# Patient Record
Sex: FEMALE | Race: WHITE | NOT HISPANIC OR LATINO | Employment: OTHER | URBAN - METROPOLITAN AREA
[De-identification: names, ages, dates, MRNs, and addresses within clinical notes are randomized per-mention and may not be internally consistent; named-entity substitution may affect disease eponyms.]

---

## 2017-02-14 DIAGNOSIS — Z00.00 ROUTINE GENERAL MEDICAL EXAMINATION AT A HEALTH CARE FACILITY: Primary | ICD-10-CM

## 2017-04-11 ENCOUNTER — OFFICE VISIT (OUTPATIENT)
Dept: OBSTETRICS AND GYNECOLOGY | Facility: CLINIC | Age: 67
End: 2017-04-11
Payer: MEDICARE

## 2017-04-11 VITALS
BODY MASS INDEX: 22.46 KG/M2 | HEIGHT: 63 IN | SYSTOLIC BLOOD PRESSURE: 116 MMHG | DIASTOLIC BLOOD PRESSURE: 62 MMHG | WEIGHT: 126.75 LBS

## 2017-04-11 DIAGNOSIS — Z12.31 VISIT FOR SCREENING MAMMOGRAM: ICD-10-CM

## 2017-04-11 DIAGNOSIS — Z12.4 ROUTINE PAPANICOLAOU SMEAR: Primary | ICD-10-CM

## 2017-04-11 PROCEDURE — 88175 CYTOPATH C/V AUTO FLUID REDO: CPT

## 2017-04-11 PROCEDURE — G0101 CA SCREEN;PELVIC/BREAST EXAM: HCPCS | Mod: S$PBB,,, | Performed by: OBSTETRICS & GYNECOLOGY

## 2017-04-11 PROCEDURE — 99212 OFFICE O/P EST SF 10 MIN: CPT | Mod: PBBFAC,PO | Performed by: OBSTETRICS & GYNECOLOGY

## 2017-04-11 PROCEDURE — 99999 PR PBB SHADOW E&M-EST. PATIENT-LVL II: CPT | Mod: PBBFAC,,, | Performed by: OBSTETRICS & GYNECOLOGY

## 2017-04-11 NOTE — PROGRESS NOTES
"HPI:   66 y.o.   OB History      Para Term  AB TAB SAB Ectopic Multiple Living    2 2 1       1       No LMP recorded. Patient is postmenopausal.    Patient is  here for her annual gynecologic exam.  She has no complaints.     ROS:  GENERAL: No fever, chills, fatigability or weight loss.  SKIN: No rashes, itching or changes in color or texture of skin.  HEAD: No headaches or recent head trauma.  EYES: Visual acuity fine. No photophobia, ocular pain or diplopia.  EARS: Denies ear pain, discharge or vertigo.  NOSE: No loss of smell, no epistaxis or postnasal drip.  MOUTH & THROAT: No hoarseness or change in voice. No excessive gum bleeding.  NODES: Denies swollen glands.  CHEST: Denies MANCERA, cyanosis, wheezing, cough and sputum production.  CARDIOVASCULAR: Denies chest pain, PND, orthopnea or reduced exercise tolerance.  ABDOMEN: Appetite fine. No weight loss. Denies diarrhea, abdominal pain, hematemesis or blood in stool.  URINARY: No flank pain, dysuria or hematuria.  PERIPHERAL VASCULAR: No claudication or cyanosis.  MUSCULOSKELETAL: No joint stiffness or swelling. Denies back pain.  NEUROLOGIC: No history of seizures, paralysis, alteration of gait or coordination.    PE:   /62  Ht 5' 3" (1.6 m)  Wt 57.5 kg (126 lb 12.2 oz)  BMI 22.46 kg/m2  APPEARANCE: Well nourished, well developed, in no acute distress.  NECK: Neck symmetric without masses or thyromegaly.  BREASTS: Symmetrical, no skin changes or visible lesions. No palpable masses, nipple discharge or adenopathy bilaterally.  ABDOMEN: Flat. Soft. No tenderness or masses. No hepatosplenomegaly. No hernias. No CVA tenderness.  VULVA: No lesions. Normal female genitalia.  URETHRAL MEATUS: Normal size and location, no lesions, no prolapse.  URETHRA: No masses, tenderness, prolapse or scarring.  VAGINA: Moist and well rugated, no discharge, no significant cystocele or rectocele.  CERVIX: No lesions and discharge. PAP done.  UTERUS: Normal size, " regular shape, mobile, non-tender, bladder base nontender.  ADNEXA: No masses, tenderness or CDS nodularity.  ANUS PERINEUM: Normal.    PROCEDURES:  Pap smear    Assessment:  Normal Gynecologic Exam    Plan:  Mammogram and Colonoscopy if indicated by current recommendations.  Return to clinic in one year or for any problems or complaints.  No uga

## 2017-04-12 ENCOUNTER — HOSPITAL ENCOUNTER (OUTPATIENT)
Dept: RADIOLOGY | Facility: CLINIC | Age: 67
Discharge: HOME | End: 2017-04-12
Payer: MEDICARE

## 2017-04-12 ENCOUNTER — CLINICAL SUPPORT (OUTPATIENT)
Dept: INTERNAL MEDICINE | Facility: CLINIC | Age: 67
End: 2017-04-12
Payer: COMMERCIAL

## 2017-04-12 ENCOUNTER — HOSPITAL ENCOUNTER (OUTPATIENT)
Dept: RADIOLOGY | Facility: HOSPITAL | Age: 67
Discharge: HOME | End: 2017-04-12
Attending: INTERNAL MEDICINE
Payer: MEDICARE

## 2017-04-12 ENCOUNTER — CLINICAL SUPPORT (OUTPATIENT)
Dept: INFECTIOUS DISEASES | Facility: CLINIC | Age: 67
End: 2017-04-12
Payer: MEDICARE

## 2017-04-12 ENCOUNTER — OFFICE VISIT (OUTPATIENT)
Dept: INTERNAL MEDICINE | Facility: CLINIC | Age: 67
End: 2017-04-12
Payer: MEDICARE

## 2017-04-12 ENCOUNTER — HOSPITAL ENCOUNTER (OUTPATIENT)
Dept: CARDIOLOGY | Facility: CLINIC | Age: 67
Discharge: HOME | End: 2017-04-12
Payer: MEDICARE

## 2017-04-12 VITALS
HEIGHT: 64 IN | BODY MASS INDEX: 21.46 KG/M2 | DIASTOLIC BLOOD PRESSURE: 80 MMHG | SYSTOLIC BLOOD PRESSURE: 125 MMHG | WEIGHT: 125.69 LBS

## 2017-04-12 DIAGNOSIS — Z00.00 ROUTINE GENERAL MEDICAL EXAMINATION AT A HEALTH CARE FACILITY: ICD-10-CM

## 2017-04-12 DIAGNOSIS — Z00.00 ANNUAL PHYSICAL EXAM: Primary | ICD-10-CM

## 2017-04-12 DIAGNOSIS — Z00.00 ROUTINE GENERAL MEDICAL EXAMINATION AT A HEALTH CARE FACILITY: Primary | ICD-10-CM

## 2017-04-12 DIAGNOSIS — R42 VERTIGO: ICD-10-CM

## 2017-04-12 LAB
ALBUMIN SERPL BCP-MCNC: 3.8 G/DL
ALP SERPL-CCNC: 65 U/L
ALT SERPL W/O P-5'-P-CCNC: 17 U/L
ANION GAP SERPL CALC-SCNC: 6 MMOL/L
AST SERPL-CCNC: 21 U/L
BILIRUB SERPL-MCNC: 0.5 MG/DL
BUN SERPL-MCNC: 14 MG/DL
CALCIUM SERPL-MCNC: 9.3 MG/DL
CHLORIDE SERPL-SCNC: 102 MMOL/L
CHOLEST/HDLC SERPL: 2.5 {RATIO}
CO2 SERPL-SCNC: 30 MMOL/L
CREAT SERPL-MCNC: 0.8 MG/DL
ERYTHROCYTE [DISTWIDTH] IN BLOOD BY AUTOMATED COUNT: 13.1 %
EST. GFR  (AFRICAN AMERICAN): >60 ML/MIN/1.73 M^2
EST. GFR  (NON AFRICAN AMERICAN): >60 ML/MIN/1.73 M^2
GLUCOSE SERPL-MCNC: 87 MG/DL
HCT VFR BLD AUTO: 41 %
HDL/CHOLESTEROL RATIO: 40.1 %
HDLC SERPL-MCNC: 177 MG/DL
HDLC SERPL-MCNC: 71 MG/DL
HGB BLD-MCNC: 13.5 G/DL
HIV 1+2 AB+HIV1 P24 AG SERPL QL IA: NEGATIVE
LDLC SERPL CALC-MCNC: 97.2 MG/DL
MCH RBC QN AUTO: 32.5 PG
MCHC RBC AUTO-ENTMCNC: 32.9 %
MCV RBC AUTO: 99 FL
NONHDLC SERPL-MCNC: 106 MG/DL
PLATELET # BLD AUTO: 312 K/UL
PMV BLD AUTO: 10 FL
POTASSIUM SERPL-SCNC: 5 MMOL/L
PROT SERPL-MCNC: 6.9 G/DL
RBC # BLD AUTO: 4.16 M/UL
SODIUM SERPL-SCNC: 138 MMOL/L
TRIGL SERPL-MCNC: 44 MG/DL
WBC # BLD AUTO: 4.64 K/UL

## 2017-04-12 PROCEDURE — 77080 DXA BONE DENSITY AXIAL: CPT | Mod: TC

## 2017-04-12 PROCEDURE — 93010 ELECTROCARDIOGRAM REPORT: CPT | Mod: S$PBB,,, | Performed by: INTERNAL MEDICINE

## 2017-04-12 PROCEDURE — 99213 OFFICE O/P EST LOW 20 MIN: CPT | Mod: PBBFAC,25 | Performed by: INTERNAL MEDICINE

## 2017-04-12 PROCEDURE — 86703 HIV-1/HIV-2 1 RESULT ANTBDY: CPT

## 2017-04-12 PROCEDURE — 80061 LIPID PANEL: CPT

## 2017-04-12 PROCEDURE — 99397 PER PM REEVAL EST PAT 65+ YR: CPT | Mod: S$PBB,,, | Performed by: INTERNAL MEDICINE

## 2017-04-12 PROCEDURE — 93005 ELECTROCARDIOGRAM TRACING: CPT | Mod: PBBFAC | Performed by: INTERNAL MEDICINE

## 2017-04-12 PROCEDURE — 85027 COMPLETE CBC AUTOMATED: CPT

## 2017-04-12 PROCEDURE — 99999 PR PBB SHADOW E&M-EST. PATIENT-LVL I: CPT | Mod: PBBFAC,,,

## 2017-04-12 PROCEDURE — 90715 TDAP VACCINE 7 YRS/> IM: CPT | Mod: PBBFAC

## 2017-04-12 PROCEDURE — 90471 IMMUNIZATION ADMIN: CPT | Mod: PBBFAC

## 2017-04-12 PROCEDURE — 77067 SCR MAMMO BI INCL CAD: CPT | Mod: TC

## 2017-04-12 PROCEDURE — 77063 BREAST TOMOSYNTHESIS BI: CPT | Mod: 26,,, | Performed by: RADIOLOGY

## 2017-04-12 PROCEDURE — 36415 COLL VENOUS BLD VENIPUNCTURE: CPT

## 2017-04-12 PROCEDURE — 77067 SCR MAMMO BI INCL CAD: CPT | Mod: 26,,, | Performed by: RADIOLOGY

## 2017-04-12 PROCEDURE — 80053 COMPREHEN METABOLIC PANEL: CPT

## 2017-04-12 PROCEDURE — 99999 PR PBB SHADOW E&M-EST. PATIENT-LVL III: CPT | Mod: PBBFAC,,, | Performed by: INTERNAL MEDICINE

## 2017-04-12 PROCEDURE — 77080 DXA BONE DENSITY AXIAL: CPT | Mod: 26,,, | Performed by: INTERNAL MEDICINE

## 2017-04-12 PROCEDURE — 71020 XR CHEST PA AND LATERAL: CPT | Mod: TC

## 2017-04-12 PROCEDURE — 99211 OFF/OP EST MAY X REQ PHY/QHP: CPT | Mod: PBBFAC,25,27

## 2017-04-12 PROCEDURE — 71020 XR CHEST PA AND LATERAL: CPT | Mod: 26,,, | Performed by: RADIOLOGY

## 2017-04-12 RX ORDER — MECLIZINE HYDROCHLORIDE 25 MG/1
25 TABLET ORAL 3 TIMES DAILY PRN
Qty: 30 TABLET | Refills: 1 | Status: SHIPPED | OUTPATIENT
Start: 2017-04-12 | End: 2020-06-05

## 2017-04-12 NOTE — LETTER
April 13, 2017    Gricelda Hassan  [address] [place] [address]             [facility] - Internal Medicine  [address] [place] [address]  Phone: [phone]  Fax: [phone] Dear Mrs. Hassan:    Thank you for allowing me to serve you and perform your Executive Health exam on 4/12/2017.  This letter will serve a brief summary of the history, physical findings, and laboratory/studies performed and recommendations at that time.    Reason for Visit: Executive Health Preventive Physical Examination    Past Medical History:   Diagnosis Date    Atrial tachycardia     Hyperlipidemia     Hypertension     Ischemic colitis        Past Surgical History:   Procedure Laterality Date    TUBAL LIGATION         Family History   Problem Relation Age of Onset    COPD Mother     Heart disease Father      CABG age 60    Hypertension Brother     Hypertension Sister     No Known Problems Daughter     No Known Problems Daughter             Review of patient's allergies indicates:   Allergen Reactions    Penicillins Rash         Current Outpatient Prescriptions:     aspirin 81 MG Chew, Take 1 tablet (81 mg total) by mouth once daily., Disp: 30 tablet, Rfl: 12    atorvastatin (LIPITOR) 40 MG tablet, Take 40 mg by mouth once daily., Disp: , Rfl:     b complex vitamins capsule, Take 1 capsule by mouth once daily., Disp: , Rfl:     calcium-vitamin D3 (CALCIUM 500 + D) 500 mg(1,250mg) -200 unit per tablet, Take 1 tablet by mouth 2 (two) times daily with meals., Disp: , Rfl:     multivitamin capsule, Take 1 capsule by mouth once daily., Disp: , Rfl:     propafenone (RHTHYMOL) 150 MG Tab, , Disp: , Rfl:     quinapril (ACCUPRIL) 20 MG tablet, Take by mouth. 1 Tablet Oral Every day, Disp: , Rfl:     meclizine (ANTIVERT) 25 mg tablet, Take 1 tablet (25 mg total) by mouth 3 (three) times daily as needed., Disp: 30 tablet, Rfl: 1     Review of Systems  Review of Systems - Negative     Physical Exam:  General:  General appearance: alert, well appearing, and in no distress.   Skin: Skin exam - normal coloration and turgor, no rashes, no suspicious skin lesions noted.  HEENT: Ears - bilateral TM's and external ear canals normal. , ENT exam reveals - ENT exam normal, no neck nodes or sinus tenderness.   Lungs: Chest: clear to auscultation, no wheezes, rales or rhonchi, symmetric air entry.   Heart: CVS exam: normal rate, regular rhythm, normal S1, S2, no murmurs, rubs, clicks or gallops.   Extremities: Exam of extremities: peripheral pulses normal, no pedal edema, no clubbing or cyanosis    Labs:  Results for orders placed or performed in visit on 04/12/17   Comprehensive metabolic panel   Result Value Ref Range    Sodium 138 136 - 145 mmol/L    Potassium 5.0 3.5 - 5.1 mmol/L    Chloride 102 95 - 110 mmol/L    CO2 30 (H) 23 - 29 mmol/L    Glucose 87 70 - 110 mg/dL    BUN, Bld 14 8 - 23 mg/dL    Creatinine 0.8 0.5 - 1.4 mg/dL    Calcium 9.3 8.7 - 10.5 mg/dL    Total Protein 6.9 6.0 - 8.4 g/dL    Albumin 3.8 3.5 - 5.2 g/dL    Total Bilirubin 0.5 0.1 - 1.0 mg/dL    Alkaline Phosphatase 65 55 - 135 U/L    AST 21 10 - 40 U/L    ALT 17 10 - 44 U/L    Anion Gap 6 (L) 8 - 16 mmol/L    eGFR if African American >60.0 >60 mL/min/1.73 m^2    eGFR if non African American >60.0 >60 mL/min/1.73 m^2   Hematology Profile   Result Value Ref Range    WBC 4.64 3.90 - 12.70 K/uL    RBC 4.16 4.00 - 5.40 M/uL    Hemoglobin 13.5 12.0 - 16.0 g/dL    Hematocrit 41.0 37.0 - 48.5 %    MCV 99 (H) 82 - 98 fL    MCH 32.5 (H) 27.0 - 31.0 pg    MCHC 32.9 32.0 - 36.0 %    RDW 13.1 11.5 - 14.5 %    Platelets 312 150 - 350 K/uL    MPV 10.0 9.2 - 12.9 fL   Lipid panel   Result Value Ref Range    Cholesterol 177 120 - 199 mg/dL    Triglycerides 44 30 - 150 mg/dL    HDL 71 40 - 75 mg/dL    LDL Cholesterol 97.2 63.0 - 159.0 mg/dL    HDL/Chol Ratio 40.1 20.0 - 50.0 %    Total Cholesterol/HDL Ratio 2.5 2.0 - 5.0    Non-HDL Cholesterol 106 mg/dL   HIV-1 and HIV-2  antibodies   Result Value Ref Range    HIV 1/2 Ag/Ab Negative Negative        Assessment/Recommendations:  Routine Health Maintenance is up to date.  You will be due for a colonoscopy this year.  I also recommend hepatitis C screening one time.  We discussed pneumonia vaccination and you indicated you've had one of the vaccines in the last 6 months or so.   You will be due for the second pneumonia vaccine a year after the first.    At this time, you appear to be in good medical condition.  I look forward to seeing you again next year.  Please contact me should you have any questions or concerns regarding physical findings, or my recommendations.       Sincerely,          Felicity Orozco MD

## 2017-04-13 ENCOUNTER — TELEPHONE (OUTPATIENT)
Dept: INTERNAL MEDICINE | Facility: CLINIC | Age: 67
End: 2017-04-13

## 2017-04-13 NOTE — PROGRESS NOTES
Subjective:       Patient ID: Gricelda Hassan is a 66 y.o. female.    Chief Complaint: Executive Health    HPI Comments: EH PE    Doing well, no complaints other than some stress- granddaughter's situation.  Is exercising.    No syncope or chest pain.  Cardiac issues all stable.    Sees Dr. Antunez once yearly.    Rarely gets vertigo, not severe.    Patient Active Problem List:     Mixed hyperlipidemia     Essential hypertension     Constipation, chronic     Former smoker: quit 1/1/1985: < 30 pack years     Abnormal findings diagnostic imaging of heart and coronary circulation     Ischemic colitis     Osteopenia: 2015- stable 2017     Atrial tachycardia      Review of Systems   Constitutional: Negative for activity change, appetite change, chills, fatigue and fever.   HENT: Negative for ear discharge, sinus pressure and sore throat.    Respiratory: Negative for apnea, cough, chest tightness, shortness of breath and wheezing.    Cardiovascular: Negative for chest pain, palpitations and leg swelling.   Gastrointestinal: Negative for abdominal distention, abdominal pain, anal bleeding, blood in stool, constipation, diarrhea, nausea and vomiting.   Genitourinary: Negative for dysuria, frequency, hematuria and vaginal bleeding.   Musculoskeletal: Negative for gait problem, joint swelling and myalgias.   Skin: Negative for rash.   Neurological: Positive for dizziness. Negative for tremors, weakness, light-headedness and headaches.   Hematological: Negative for adenopathy. Does not bruise/bleed easily.   Psychiatric/Behavioral: Negative for confusion, hallucinations, sleep disturbance and suicidal ideas.        Some stress, no SI or HI       Objective:      Physical Exam   Constitutional: She is oriented to person, place, and time. She appears well-developed and well-nourished.   HENT:   Head: Normocephalic and atraumatic.   Right Ear: External ear normal.   Left Ear: External ear normal.   Nose: Nose normal.   Mouth/Throat:  Oropharynx is clear and moist. No oropharyngeal exudate.   Eyes: Conjunctivae and EOM are normal. No scleral icterus.   Neck: Normal range of motion. Neck supple. No JVD present. No thyromegaly present.   Cardiovascular: Normal rate, regular rhythm, normal heart sounds and intact distal pulses.  Exam reveals no gallop.    No murmur heard.  Pulmonary/Chest: Effort normal and breath sounds normal. No respiratory distress. She has no wheezes. She exhibits no tenderness.   Abdominal: Soft. Bowel sounds are normal. She exhibits no distension and no mass. There is no tenderness. There is no rebound and no guarding.   Musculoskeletal: Normal range of motion. She exhibits no edema or tenderness.   Lymphadenopathy:     She has no cervical adenopathy.   Neurological: She is alert and oriented to person, place, and time. She displays normal reflexes. No cranial nerve deficit. Coordination normal.   Skin: Skin is warm. No rash noted. No erythema.   Psychiatric: She has a normal mood and affect. Her behavior is normal. Judgment and thought content normal.   Nursing note and vitals reviewed.      Assessment:       1. Annual physical exam    2. Vertigo        Plan:         Annual physical exam    Vertigo  -     meclizine (ANTIVERT) 25 mg tablet; Take 1 tablet (25 mg total) by mouth 3 (three) times daily as needed.  Dispense: 30 tablet; Refill: 1    Hydration  Exercise, fall prevention    I will review all studies and determine further tx depending on findings    She will have colonoscopy and Hep C screening per her PCP    She says she got pneumonia vaccine elsewhere as well

## 2017-04-17 ENCOUNTER — PATIENT MESSAGE (OUTPATIENT)
Dept: INTERNAL MEDICINE | Facility: CLINIC | Age: 67
End: 2017-04-17

## 2017-04-21 ENCOUNTER — PATIENT MESSAGE (OUTPATIENT)
Dept: INTERNAL MEDICINE | Facility: CLINIC | Age: 67
End: 2017-04-21

## 2018-02-19 DIAGNOSIS — Z12.39 SCREENING BREAST EXAMINATION: ICD-10-CM

## 2018-02-19 DIAGNOSIS — Z00.00 ROUTINE GENERAL MEDICAL EXAMINATION AT A HEALTH CARE FACILITY: Primary | ICD-10-CM

## 2018-03-15 ENCOUNTER — HOSPITAL ENCOUNTER (OUTPATIENT)
Dept: RADIOLOGY | Facility: HOSPITAL | Age: 68
Discharge: HOME | End: 2018-03-15
Attending: INTERNAL MEDICINE
Payer: MEDICARE

## 2018-03-15 ENCOUNTER — OFFICE VISIT (OUTPATIENT)
Dept: OBSTETRICS AND GYNECOLOGY | Facility: CLINIC | Age: 68
End: 2018-03-15
Payer: MEDICARE

## 2018-03-15 ENCOUNTER — HOSPITAL ENCOUNTER (OUTPATIENT)
Dept: CARDIOLOGY | Facility: CLINIC | Age: 68
Discharge: HOME | End: 2018-03-15
Payer: MEDICARE

## 2018-03-15 ENCOUNTER — CLINICAL SUPPORT (OUTPATIENT)
Dept: INTERNAL MEDICINE | Facility: CLINIC | Age: 68
End: 2018-03-15
Payer: MEDICARE

## 2018-03-15 ENCOUNTER — OFFICE VISIT (OUTPATIENT)
Dept: INTERNAL MEDICINE | Facility: CLINIC | Age: 68
End: 2018-03-15
Payer: MEDICARE

## 2018-03-15 VITALS
WEIGHT: 127.19 LBS | DIASTOLIC BLOOD PRESSURE: 60 MMHG | BODY MASS INDEX: 22.54 KG/M2 | HEIGHT: 63 IN | SYSTOLIC BLOOD PRESSURE: 110 MMHG

## 2018-03-15 VITALS
DIASTOLIC BLOOD PRESSURE: 75 MMHG | HEIGHT: 63 IN | SYSTOLIC BLOOD PRESSURE: 120 MMHG | WEIGHT: 125.69 LBS | BODY MASS INDEX: 22.27 KG/M2

## 2018-03-15 DIAGNOSIS — E78.5 HYPERLIPIDEMIA, UNSPECIFIED HYPERLIPIDEMIA TYPE: ICD-10-CM

## 2018-03-15 DIAGNOSIS — Z00.00 ANNUAL PHYSICAL EXAM: Primary | ICD-10-CM

## 2018-03-15 DIAGNOSIS — Z12.39 SCREENING BREAST EXAMINATION: ICD-10-CM

## 2018-03-15 DIAGNOSIS — Z00.00 ROUTINE GENERAL MEDICAL EXAMINATION AT A HEALTH CARE FACILITY: ICD-10-CM

## 2018-03-15 DIAGNOSIS — Z12.4 SCREENING FOR CERVICAL CANCER: Primary | ICD-10-CM

## 2018-03-15 DIAGNOSIS — D64.9 ANEMIA, UNSPECIFIED TYPE: Primary | ICD-10-CM

## 2018-03-15 DIAGNOSIS — Z01.419 WELL WOMAN EXAM WITH ROUTINE GYNECOLOGICAL EXAM: ICD-10-CM

## 2018-03-15 LAB
ALBUMIN SERPL BCP-MCNC: 3.9 G/DL
ALP SERPL-CCNC: 66 U/L
ALT SERPL W/O P-5'-P-CCNC: 16 U/L
ANION GAP SERPL CALC-SCNC: 9 MMOL/L
AST SERPL-CCNC: 21 U/L
BILIRUB SERPL-MCNC: 0.7 MG/DL
BUN SERPL-MCNC: 14 MG/DL
CALCIUM SERPL-MCNC: 9.5 MG/DL
CHLORIDE SERPL-SCNC: 100 MMOL/L
CHOLEST SERPL-MCNC: 187 MG/DL
CHOLEST/HDLC SERPL: 2.4 {RATIO}
CO2 SERPL-SCNC: 27 MMOL/L
CREAT SERPL-MCNC: 0.8 MG/DL
ERYTHROCYTE [DISTWIDTH] IN BLOOD BY AUTOMATED COUNT: 12.8 %
EST. GFR  (AFRICAN AMERICAN): >60 ML/MIN/1.73 M^2
EST. GFR  (NON AFRICAN AMERICAN): >60 ML/MIN/1.73 M^2
GLUCOSE SERPL-MCNC: 90 MG/DL
HCT VFR BLD AUTO: 38.5 %
HDLC SERPL-MCNC: 78 MG/DL
HDLC SERPL: 41.7 %
HGB BLD-MCNC: 12.9 G/DL
LDLC SERPL CALC-MCNC: 99.6 MG/DL
MCH RBC QN AUTO: 33.2 PG
MCHC RBC AUTO-ENTMCNC: 33.5 G/DL
MCV RBC AUTO: 99 FL
NONHDLC SERPL-MCNC: 109 MG/DL
PLATELET # BLD AUTO: 288 K/UL
PMV BLD AUTO: 10.1 FL
POTASSIUM SERPL-SCNC: 4.5 MMOL/L
PROT SERPL-MCNC: 7.1 G/DL
RBC # BLD AUTO: 3.89 M/UL
SODIUM SERPL-SCNC: 136 MMOL/L
TRIGL SERPL-MCNC: 47 MG/DL
WBC # BLD AUTO: 5.12 K/UL

## 2018-03-15 PROCEDURE — G0101 CA SCREEN;PELVIC/BREAST EXAM: HCPCS | Mod: S$GLB,,, | Performed by: OBSTETRICS & GYNECOLOGY

## 2018-03-15 PROCEDURE — 85027 COMPLETE CBC AUTOMATED: CPT

## 2018-03-15 PROCEDURE — 99212 OFFICE O/P EST SF 10 MIN: CPT | Mod: PBBFAC,PO | Performed by: OBSTETRICS & GYNECOLOGY

## 2018-03-15 PROCEDURE — 77067 SCR MAMMO BI INCL CAD: CPT | Mod: TC

## 2018-03-15 PROCEDURE — 99999 PR PBB SHADOW E&M-EST. PATIENT-LVL III: CPT | Mod: PBBFAC,,, | Performed by: INTERNAL MEDICINE

## 2018-03-15 PROCEDURE — 80061 LIPID PANEL: CPT

## 2018-03-15 PROCEDURE — 88175 CYTOPATH C/V AUTO FLUID REDO: CPT

## 2018-03-15 PROCEDURE — 93010 ELECTROCARDIOGRAM REPORT: CPT | Mod: S$PBB,,, | Performed by: INTERNAL MEDICINE

## 2018-03-15 PROCEDURE — 80053 COMPREHEN METABOLIC PANEL: CPT

## 2018-03-15 PROCEDURE — 77067 SCR MAMMO BI INCL CAD: CPT | Mod: 26,,, | Performed by: RADIOLOGY

## 2018-03-15 PROCEDURE — 99213 OFFICE O/P EST LOW 20 MIN: CPT | Mod: PBBFAC,25,27 | Performed by: INTERNAL MEDICINE

## 2018-03-15 PROCEDURE — 77063 BREAST TOMOSYNTHESIS BI: CPT | Mod: 26,,, | Performed by: RADIOLOGY

## 2018-03-15 PROCEDURE — 93005 ELECTROCARDIOGRAM TRACING: CPT | Mod: PBBFAC | Performed by: INTERNAL MEDICINE

## 2018-03-15 PROCEDURE — 99999 PR PBB SHADOW E&M-EST. PATIENT-LVL II: CPT | Mod: PBBFAC,,, | Performed by: OBSTETRICS & GYNECOLOGY

## 2018-03-15 PROCEDURE — 36415 COLL VENOUS BLD VENIPUNCTURE: CPT

## 2018-03-15 PROCEDURE — 99397 PER PM REEVAL EST PAT 65+ YR: CPT | Mod: S$PBB,,, | Performed by: INTERNAL MEDICINE

## 2018-03-15 NOTE — LETTER
March 15, 2018    Gricelda Hassan  [address] [place] [address]             [facility] - Internal Medicine  [address] [place] [address]  Phone: [phone]  Fax: [phone] Dear Mrs. Hassan:    Thank you for allowing me to serve you and perform your Executive Health exam on 3/15/2018.  This letter will serve a brief summary of the history, physical findings, and laboratory/studies performed and recommendations at that time.    Reason for Visit: Executive Health Preventive Physical Examination    Past Medical History:   Diagnosis Date    Atrial tachycardia     Hyperlipidemia     Hypertension     Ischemic colitis        Past Surgical History:   Procedure Laterality Date    TUBAL LIGATION         Family History   Problem Relation Age of Onset    COPD Mother     Heart disease Father      CABG age 60    Hypertension Brother     Neuropathy Brother     Hypertension Sister     No Known Problems Daughter     No Known Problems Daughter             Review of patient's allergies indicates:   Allergen Reactions    Penicillins Rash         Current Outpatient Prescriptions:     aspirin 81 MG Chew, Take 1 tablet (81 mg total) by mouth once daily., Disp: 30 tablet, Rfl: 12    atorvastatin (LIPITOR) 40 MG tablet, Take 40 mg by mouth once daily., Disp: , Rfl:     b complex vitamins capsule, Take 1 capsule by mouth once daily., Disp: , Rfl:     calcium-vitamin D3 (CALCIUM 500 + D) 500 mg(1,250mg) -200 unit per tablet, Take 1 tablet by mouth 2 (two) times daily with meals., Disp: , Rfl:     meclizine (ANTIVERT) 25 mg tablet, Take 1 tablet (25 mg total) by mouth 3 (three) times daily as needed., Disp: 30 tablet, Rfl: 1    multivitamin capsule, Take 1 capsule by mouth once daily., Disp: , Rfl:     propafenone (RHTHYMOL) 150 MG Tab, Take 1 tablet by mouth 2 (two) times daily., Disp: , Rfl:     quinapril (ACCUPRIL) 20 MG tablet, Take by mouth. 1 Tablet Oral Every day, Disp: , Rfl:      Review of  Systems  Review of Systems - Negative except for occasional tachycardia, and a recent broken toe.    Physical Exam:  General: General appearance: alert, well appearing, and in no distress.   Skin: Skin exam - normal coloration and turgor, no rashes, no suspicious skin lesions noted.  HEENT: Ears - bilateral TM's and external ear canals normal. , ENT exam reveals - ENT exam normal, no neck nodes or sinus tenderness.   Lungs: Chest: clear to auscultation, no wheezes, rales or rhonchi, symmetric air entry.   Heart: CVS exam: normal rate, regular rhythm, normal S1, S2, no murmurs, rubs, clicks or gallops.   Extremities: Exam of extremities: peripheral pulses normal, no pedal edema, no clubbing or cyanosis    Labs:  Results for orders placed or performed in visit on 03/15/18   Comprehensive metabolic panel   Result Value Ref Range    Sodium 136 136 - 145 mmol/L    Potassium 4.5 3.5 - 5.1 mmol/L    Chloride 100 95 - 110 mmol/L    CO2 27 23 - 29 mmol/L    Glucose 90 70 - 110 mg/dL    BUN, Bld 14 8 - 23 mg/dL    Creatinine 0.8 0.5 - 1.4 mg/dL    Calcium 9.5 8.7 - 10.5 mg/dL    Total Protein 7.1 6.0 - 8.4 g/dL    Albumin 3.9 3.5 - 5.2 g/dL    Total Bilirubin 0.7 0.1 - 1.0 mg/dL    Alkaline Phosphatase 66 55 - 135 U/L    AST 21 10 - 40 U/L    ALT 16 10 - 44 U/L    Anion Gap 9 8 - 16 mmol/L    eGFR if African American >60.0 >60 mL/min/1.73 m^2    eGFR if non African American >60.0 >60 mL/min/1.73 m^2   CBC Without Differential   Result Value Ref Range    WBC 5.12 3.90 - 12.70 K/uL    RBC 3.89 (L) 4.00 - 5.40 M/uL    Hemoglobin 12.9 12.0 - 16.0 g/dL    Hematocrit 38.5 37.0 - 48.5 %    MCV 99 (H) 82 - 98 fL    MCH 33.2 (H) 27.0 - 31.0 pg    MCHC 33.5 32.0 - 36.0 g/dL    RDW 12.8 11.5 - 14.5 %    Platelets 288 150 - 350 K/uL    MPV 10.1 9.2 - 12.9 fL   Lipid panel   Result Value Ref Range    Cholesterol 187 120 - 199 mg/dL    Triglycerides 47 30 - 150 mg/dL    HDL 78 (H) 40 - 75 mg/dL    LDL Cholesterol 99.6 63.0 - 159.0 mg/dL     HDL/Chol Ratio 41.7 20.0 - 50.0 %    Total Cholesterol/HDL Ratio 2.4 2.0 - 5.0    Non-HDL Cholesterol 109 mg/dL      EKG was acceptable. Mammogram also acceptable.      Assessment/Recommendations:  Routine Health Maintenance: You are due for a colonoscopy.  You have indicated that you will schedule this with your gastroenterologist at [facility].  You also have had one time testing for hepatitis C and your pneumonia vaccine series; if you are able to provide information to me at your next visit, that would be most appreciated.    Main concern is that your cholesterol is not quite at target range.  Your LDL should be around 70 or 75.  I would recommend that you discuss with your cardiologist whether you need to increase your atorvastatin to 80 mg nightly.    At this time, you appear to be in good medical condition.  Please make sure that you're working on exercise and balance training.  I'm concerned about risk for osteoporosis given your recent fracture.  We will want to do a bone density exam next year.     Also, please be sure to keep your dermatology follow-up.     I look forward to seeing you again next year.  Please contact me should you have any questions or concerns regarding physical findings, or my recommendations.      Sincerely,            Felicity Orozco MD, FACP

## 2018-03-15 NOTE — PROGRESS NOTES
Subjective:       Patient ID: Gricelda Hassan is a 67 y.o. female.    Chief Complaint: Executive Health    Annual exam/EH PE    Overall doing OK.  Some tachycardia events- one time 3 hours (after  passed out on beach); also another 10 minute on; then another one when sister .  She sees an outside cardiologist (Dr Alexys Rothman at ).  Offered ablation by her cardiologist but she is not sure about this.    Broken toe- no obvious major trauma.  4th toe on left foot.  Went to Podiatry.  Osteoporosis issues reviewed.  She is not interested in medication.  Advised that we should have her get another bone density in one year.    Lipids, CAD. No chest pain, pressure, tightness or SOB.    Patient Active Problem List:     Mixed hyperlipidemia     Essential hypertension     Constipation, chronic     Former smoker: quit 1985: < 30 pack years     Abnormal findings diagnostic imaging of heart and coronary circulation     Ischemic colitis     Osteopenia of multiple sites: see DEXA 2017     Atrial tachycardia- follows with Dr Rothman at             Review of Systems   Constitutional: Negative for activity change, appetite change, chills, fatigue and fever.   HENT: Negative for congestion, hearing loss, sinus pressure and sore throat.    Eyes: Negative for visual disturbance.   Respiratory: Negative for apnea, cough, shortness of breath and wheezing.    Cardiovascular: Negative for chest pain, palpitations and leg swelling.        See above   Gastrointestinal: Negative for abdominal distention, abdominal pain, constipation, diarrhea, nausea and vomiting.   Genitourinary: Negative for dysuria, frequency, hematuria and vaginal bleeding.   Musculoskeletal: Negative for gait problem, joint swelling and myalgias.        Sx abated   Skin: Negative for rash.        Follows in [facility] due now   Neurological: Negative for dizziness, weakness, light-headedness and headaches.   Hematological: Negative for adenopathy. Does not  bruise/bleed easily.   Psychiatric/Behavioral: Negative for confusion, hallucinations, sleep disturbance and suicidal ideas.       Objective:      Physical Exam   Constitutional: She is oriented to person, place, and time. She appears well-developed and well-nourished.   HENT:   Head: Normocephalic and atraumatic.   Right Ear: External ear normal.   Left Ear: External ear normal.   Nose: Nose normal.   Mouth/Throat: Oropharynx is clear and moist. No oropharyngeal exudate.   Eyes: Conjunctivae and EOM are normal. No scleral icterus.   Neck: Normal range of motion. Neck supple. No JVD present. No thyromegaly present.   Cardiovascular: Normal rate, regular rhythm, normal heart sounds and intact distal pulses.  Exam reveals no gallop.    No murmur heard.  Pulmonary/Chest: Effort normal and breath sounds normal. No respiratory distress. She has no wheezes.   Abdominal: Soft. Bowel sounds are normal. She exhibits no distension and no mass. There is no tenderness. There is no rebound and no guarding.   Musculoskeletal: Normal range of motion. She exhibits no edema or tenderness.   Lymphadenopathy:     She has no cervical adenopathy.   Neurological: She is alert and oriented to person, place, and time. She displays normal reflexes. No cranial nerve deficit. Coordination normal.   Skin: Skin is warm. No rash noted. No erythema.   Psychiatric: She has a normal mood and affect. Her behavior is normal. Judgment and thought content normal.   Nursing note and vitals reviewed.      Assessment:       1. Annual physical exam        Plan:         I will review all studies and determine further tx depending on findings  Osteoporosis issues discussed check 1 year; exercise and balance training reviewed  Cardiology follow up  Due for colonoscopy she will schedule at     She says she has had both pneumonia vaccines

## 2018-03-15 NOTE — LETTER
March 15, 2018    Gricelda Hassan  [address] [address]             [facility] - Internal Medicine  [address] [place] [address]  Phone: [phone]  Fax: [phone] Dear Mrs. Hassan:    Thank you for allowing me to serve you and perform your Executive Health exam on 3/15/2018.  This letter will serve a brief summary of the history, physical findings, and laboratory/studies performed and recommendations at that time.    Reason for Visit: Executive Health Preventive Physical Examination    Past Medical History:   Diagnosis Date    Atrial tachycardia     Hyperlipidemia     Hypertension     Ischemic colitis        Past Surgical History:   Procedure Laterality Date    TUBAL LIGATION         Family History   Problem Relation Age of Onset    COPD Mother     Heart disease Father      CABG age 60    Hypertension Brother     Neuropathy Brother     Hypertension Sister     No Known Problems Daughter     No Known Problems Daughter             Review of patient's allergies indicates:   Allergen Reactions    Penicillins Rash         Current Outpatient Prescriptions:     aspirin 81 MG Chew, Take 1 tablet (81 mg total) by mouth once daily., Disp: 30 tablet, Rfl: 12    atorvastatin (LIPITOR) 40 MG tablet, Take 40 mg by mouth once daily., Disp: , Rfl:     b complex vitamins capsule, Take 1 capsule by mouth once daily., Disp: , Rfl:     calcium-vitamin D3 (CALCIUM 500 + D) 500 mg(1,250mg) -200 unit per tablet, Take 1 tablet by mouth 2 (two) times daily with meals., Disp: , Rfl:     meclizine (ANTIVERT) 25 mg tablet, Take 1 tablet (25 mg total) by mouth 3 (three) times daily as needed., Disp: 30 tablet, Rfl: 1    multivitamin capsule, Take 1 capsule by mouth once daily., Disp: , Rfl:     propafenone (RHTHYMOL) 150 MG Tab, Take 1 tablet by mouth 2 (two) times daily., Disp: , Rfl:     quinapril (ACCUPRIL) 20 MG tablet, Take by mouth. 1 Tablet Oral Every day, Disp: , Rfl:      Review of  Systems  Review of Systems - Negative except for occasional tachycardia, and a recent broken toe.    Physical Exam:  General: General appearance: alert, well appearing, and in no distress.   Skin: Skin exam - normal coloration and turgor, no rashes, no suspicious skin lesions noted.  HEENT: Ears - bilateral TM's and external ear canals normal. , ENT exam reveals - ENT exam normal, no neck nodes or sinus tenderness.   Lungs: Chest: clear to auscultation, no wheezes, rales or rhonchi, symmetric air entry.   Heart: CVS exam: normal rate, regular rhythm, normal S1, S2, no murmurs, rubs, clicks or gallops.   Extremities: Exam of extremities: peripheral pulses normal, no pedal edema, no clubbing or cyanosis    Labs:  Results for orders placed or performed in visit on 03/15/18   Comprehensive metabolic panel   Result Value Ref Range    Sodium 136 136 - 145 mmol/L    Potassium 4.5 3.5 - 5.1 mmol/L    Chloride 100 95 - 110 mmol/L    CO2 27 23 - 29 mmol/L    Glucose 90 70 - 110 mg/dL    BUN, Bld 14 8 - 23 mg/dL    Creatinine 0.8 0.5 - 1.4 mg/dL    Calcium 9.5 8.7 - 10.5 mg/dL    Total Protein 7.1 6.0 - 8.4 g/dL    Albumin 3.9 3.5 - 5.2 g/dL    Total Bilirubin 0.7 0.1 - 1.0 mg/dL    Alkaline Phosphatase 66 55 - 135 U/L    AST 21 10 - 40 U/L    ALT 16 10 - 44 U/L    Anion Gap 9 8 - 16 mmol/L    eGFR if African American >60.0 >60 mL/min/1.73 m^2    eGFR if non African American >60.0 >60 mL/min/1.73 m^2   CBC Without Differential   Result Value Ref Range    WBC 5.12 3.90 - 12.70 K/uL    RBC 3.89 (L) 4.00 - 5.40 M/uL    Hemoglobin 12.9 12.0 - 16.0 g/dL    Hematocrit 38.5 37.0 - 48.5 %    MCV 99 (H) 82 - 98 fL    MCH 33.2 (H) 27.0 - 31.0 pg    MCHC 33.5 32.0 - 36.0 g/dL    RDW 12.8 11.5 - 14.5 %    Platelets 288 150 - 350 K/uL    MPV 10.1 9.2 - 12.9 fL   Lipid panel   Result Value Ref Range    Cholesterol 187 120 - 199 mg/dL    Triglycerides 47 30 - 150 mg/dL    HDL 78 (H) 40 - 75 mg/dL    LDL Cholesterol 99.6 63.0 - 159.0 mg/dL     HDL/Chol Ratio 41.7 20.0 - 50.0 %    Total Cholesterol/HDL Ratio 2.4 2.0 - 5.0    Non-HDL Cholesterol 109 mg/dL      EKG was acceptable. Mammogram also acceptable.      Assessment/Recommendations:  Routine Health Maintenance: You are due for a colonoscopy.  You have indicated that you will schedule this with your gastroenterologist at [facility].  You also have had one time testing for hepatitis C and your pneumonia vaccine series; if you are able to provide information to me at your next visit, that would be most appreciated.    Main concern is that your cholesterol is not quite at target range.  Your LDL should be around 70 or 75.  I would recommend that you discuss with your cardiologist whether you need to increase your atorvastatin to 80 mg nightly.    At this time, you appear to be in good medical condition.  Please make sure that you're working on exercise and balance training.  I'm concerned about risk for osteoporosis given your recent fracture.  We will want to do a bone density exam next year.       I look forward to seeing you again next year.  Please contact me should you have any questions or concerns regarding physical findings, or my recommendations.      Sincerely,            Felicity Orozco MD, FACP

## 2019-01-30 DIAGNOSIS — Z13.820 ENCOUNTER FOR SCREENING FOR OSTEOPOROSIS: ICD-10-CM

## 2019-01-30 DIAGNOSIS — R06.00 DYSPNEA, UNSPECIFIED TYPE: ICD-10-CM

## 2019-01-30 DIAGNOSIS — Z00.00 ANNUAL PHYSICAL EXAM: Primary | ICD-10-CM

## 2019-01-30 DIAGNOSIS — Z12.39 SCREENING BREAST EXAMINATION: ICD-10-CM

## 2019-01-30 DIAGNOSIS — I47.29 PAROXYSMAL VENTRICULAR TACHYCARDIA: ICD-10-CM

## 2019-01-30 DIAGNOSIS — Z13.820 SCREENING FOR OSTEOPOROSIS: ICD-10-CM

## 2019-03-15 DIAGNOSIS — Z13.820 SCREENING FOR OSTEOPOROSIS: ICD-10-CM

## 2019-03-15 DIAGNOSIS — Z00.00 ANNUAL PHYSICAL EXAM: Primary | ICD-10-CM

## 2019-03-15 DIAGNOSIS — M89.9 DISORDER OF BONE: ICD-10-CM

## 2019-03-20 ENCOUNTER — PATIENT MESSAGE (OUTPATIENT)
Dept: FAMILY MEDICINE | Facility: CLINIC | Age: 69
End: 2019-03-20

## 2019-04-04 ENCOUNTER — TELEPHONE (OUTPATIENT)
Dept: FAMILY MEDICINE | Facility: CLINIC | Age: 69
End: 2019-04-04

## 2019-04-04 NOTE — TELEPHONE ENCOUNTER
----- Message from Mina Kevin sent at 4/4/2019  8:20 AM CDT -----  Contact: Patient  Type: Needs Medical Advice    Who Called:  Patient  Best Call Back Number: [phone]  Additional Information: Patient is scheduled for stress test tomorrow and received a letter stating she shouldn't eat or drink anything. She has questions about taking medication and stress test instructions in Epic say she can have a light meal 4 hours prior. Please advise what she should do

## 2019-04-04 NOTE — TELEPHONE ENCOUNTER
Phoned pt in regards to message. Verified that pt does not have to fast for 12 hours per Kami in Cardiology that she can have a light meal 4 hours prior to appt and hold caffeine even decaf and chocolate for 12 hours prior to appt. Also pt does not have to hold her medications either. Pt voiced understanding. CLC

## 2019-04-05 ENCOUNTER — HOSPITAL ENCOUNTER (OUTPATIENT)
Dept: RADIOLOGY | Facility: HOSPITAL | Age: 69
Discharge: HOME | End: 2019-04-05
Attending: FAMILY MEDICINE
Payer: MEDICARE

## 2019-04-05 ENCOUNTER — CLINICAL SUPPORT (OUTPATIENT)
Dept: INTERNAL MEDICINE | Facility: CLINIC | Age: 69
End: 2019-04-05
Payer: COMMERCIAL

## 2019-04-05 ENCOUNTER — OFFICE VISIT (OUTPATIENT)
Dept: OBSTETRICS AND GYNECOLOGY | Facility: CLINIC | Age: 69
End: 2019-04-05
Payer: MEDICARE

## 2019-04-05 ENCOUNTER — CLINICAL SUPPORT (OUTPATIENT)
Dept: CARDIOLOGY | Facility: CLINIC | Age: 69
End: 2019-04-05
Attending: FAMILY MEDICINE
Payer: MEDICARE

## 2019-04-05 VITALS — HEIGHT: 63 IN | BODY MASS INDEX: 22.54 KG/M2 | WEIGHT: 127.19 LBS

## 2019-04-05 VITALS
DIASTOLIC BLOOD PRESSURE: 82 MMHG | DIASTOLIC BLOOD PRESSURE: 80 MMHG | WEIGHT: 127 LBS | WEIGHT: 124.31 LBS | TEMPERATURE: 99 F | WEIGHT: 128.31 LBS | BODY MASS INDEX: 22.5 KG/M2 | HEART RATE: 56 BPM | SYSTOLIC BLOOD PRESSURE: 129 MMHG | HEIGHT: 63 IN | HEIGHT: 63 IN | BODY MASS INDEX: 22.03 KG/M2 | SYSTOLIC BLOOD PRESSURE: 146 MMHG | HEIGHT: 63 IN | BODY MASS INDEX: 22.73 KG/M2

## 2019-04-05 DIAGNOSIS — Z12.39 SCREENING BREAST EXAMINATION: ICD-10-CM

## 2019-04-05 DIAGNOSIS — D64.9 HEMOGLOBIN LOW: ICD-10-CM

## 2019-04-05 DIAGNOSIS — E78.5 DYSLIPIDEMIA: ICD-10-CM

## 2019-04-05 DIAGNOSIS — I47.29 PAROXYSMAL VENTRICULAR TACHYCARDIA: ICD-10-CM

## 2019-04-05 DIAGNOSIS — Z00.00 ANNUAL PHYSICAL EXAM: Primary | ICD-10-CM

## 2019-04-05 DIAGNOSIS — Z01.419 ENCOUNTER FOR WELL WOMAN EXAM WITH ROUTINE GYNECOLOGICAL EXAM: Primary | ICD-10-CM

## 2019-04-05 DIAGNOSIS — Z13.820 SCREENING FOR OSTEOPOROSIS: ICD-10-CM

## 2019-04-05 DIAGNOSIS — R06.00 DYSPNEA, UNSPECIFIED TYPE: ICD-10-CM

## 2019-04-05 DIAGNOSIS — M89.9 DISORDER OF BONE: ICD-10-CM

## 2019-04-05 LAB
ALBUMIN SERPL BCP-MCNC: 3.7 G/DL (ref 3.5–5.2)
ALP SERPL-CCNC: 63 U/L (ref 55–135)
ALT SERPL W/O P-5'-P-CCNC: 13 U/L (ref 10–44)
ANION GAP SERPL CALC-SCNC: 9 MMOL/L (ref 8–16)
AST SERPL-CCNC: 19 U/L (ref 10–40)
BILIRUB SERPL-MCNC: 0.6 MG/DL (ref 0.1–1)
BUN SERPL-MCNC: 13 MG/DL (ref 8–23)
CALCIUM SERPL-MCNC: 9.2 MG/DL (ref 8.7–10.5)
CHLORIDE SERPL-SCNC: 103 MMOL/L (ref 95–110)
CHOLEST SERPL-MCNC: 170 MG/DL (ref 120–199)
CHOLEST/HDLC SERPL: 2.4 {RATIO} (ref 2–5)
CO2 SERPL-SCNC: 27 MMOL/L (ref 23–29)
CREAT SERPL-MCNC: 0.8 MG/DL (ref 0.5–1.4)
CV STRESS BASE HR: 61 BPM
DIASTOLIC BLOOD PRESSURE: 81 MMHG
ERYTHROCYTE [DISTWIDTH] IN BLOOD BY AUTOMATED COUNT: 12.9 % (ref 11.5–14.5)
EST. GFR  (AFRICAN AMERICAN): >60 ML/MIN/1.73 M^2
EST. GFR  (NON AFRICAN AMERICAN): >60 ML/MIN/1.73 M^2
GLUCOSE SERPL-MCNC: 86 MG/DL (ref 70–110)
HCT VFR BLD AUTO: 38.3 % (ref 37–48.5)
HDLC SERPL-MCNC: 70 MG/DL (ref 40–75)
HDLC SERPL: 41.2 % (ref 20–50)
HGB BLD-MCNC: 12.6 G/DL (ref 12–16)
LDLC SERPL CALC-MCNC: 88.4 MG/DL (ref 63–159)
MCH RBC QN AUTO: 33.2 PG (ref 27–31)
MCHC RBC AUTO-ENTMCNC: 32.9 G/DL (ref 32–36)
MCV RBC AUTO: 101 FL (ref 82–98)
NONHDLC SERPL-MCNC: 100 MG/DL
OHS CV CPX 1 MINUTE RECOVERY HEART RATE: 137 BPM
OHS CV CPX 85 PERCENT MAX PREDICTED HEART RATE MALE: 125
OHS CV CPX ESTIMATED METS: 15
OHS CV CPX MAX PREDICTED HEART RATE: 147
OHS CV CPX PATIENT IS FEMALE: 1
OHS CV CPX PATIENT IS MALE: 0
OHS CV CPX PEAK DIASTOLIC BLOOD PRESSURE: 110 MMHG
OHS CV CPX PEAK HEAR RATE: 164 BPM
OHS CV CPX PEAK RATE PRESSURE PRODUCT: NORMAL
OHS CV CPX PEAK SYSTOLIC BLOOD PRESSURE: 196 MMHG
OHS CV CPX PERCENT MAX PREDICTED HEART RATE ACHIEVED: 112
OHS CV CPX RATE PRESSURE PRODUCT PRESENTING: 9211
PLATELET # BLD AUTO: 283 K/UL (ref 150–350)
PMV BLD AUTO: 10.8 FL (ref 9.2–12.9)
POTASSIUM SERPL-SCNC: 4.4 MMOL/L (ref 3.5–5.1)
PROT SERPL-MCNC: 6.6 G/DL (ref 6–8.4)
RBC # BLD AUTO: 3.8 M/UL (ref 4–5.4)
SODIUM SERPL-SCNC: 139 MMOL/L (ref 136–145)
STRESS ECHO POST EXERCISE DUR MIN: 7 MIN
STRESS ECHO POST EXERCISE DUR SEC: 52
SYSTOLIC BLOOD PRESSURE: 151 MMHG
TRIGL SERPL-MCNC: 58 MG/DL (ref 30–150)
WBC # BLD AUTO: 3.61 K/UL (ref 3.9–12.7)

## 2019-04-05 PROCEDURE — G0101 PR CA SCREEN;PELVIC/BREAST EXAM: ICD-10-PCS | Mod: S$PBB,,, | Performed by: OBSTETRICS & GYNECOLOGY

## 2019-04-05 PROCEDURE — G0101 CA SCREEN;PELVIC/BREAST EXAM: HCPCS | Mod: S$PBB,,, | Performed by: OBSTETRICS & GYNECOLOGY

## 2019-04-05 PROCEDURE — 71046 X-RAY EXAM CHEST 2 VIEWS: CPT | Mod: TC,FY,PO

## 2019-04-05 PROCEDURE — 99999 PR PBB SHADOW E&M-EST. PATIENT-LVL III: ICD-10-PCS | Mod: PBBFAC,,, | Performed by: OBSTETRICS & GYNECOLOGY

## 2019-04-05 PROCEDURE — 99999 PR PBB SHADOW E&M-EST. PATIENT-LVL I: ICD-10-PCS | Mod: PBBFAC,,,

## 2019-04-05 PROCEDURE — 77063 BREAST TOMOSYNTHESIS BI: CPT | Mod: 26,,, | Performed by: RADIOLOGY

## 2019-04-05 PROCEDURE — 77063 MAMMO DIGITAL SCREENING BILAT WITH TOMOSYNTHESIS_CAD: ICD-10-PCS | Mod: 26,,, | Performed by: RADIOLOGY

## 2019-04-05 PROCEDURE — 93016 TREADMILL STRESS TEST (CUPID ONLY): ICD-10-PCS | Mod: S$PBB,,, | Performed by: INTERNAL MEDICINE

## 2019-04-05 PROCEDURE — 77067 MAMMO DIGITAL SCREENING BILAT WITH TOMOSYNTHESIS_CAD: ICD-10-PCS | Mod: 26,,, | Performed by: RADIOLOGY

## 2019-04-05 PROCEDURE — 71046 XR CHEST PA AND LATERAL: ICD-10-PCS | Mod: 26,,, | Performed by: RADIOLOGY

## 2019-04-05 PROCEDURE — 93016 CV STRESS TEST SUPVJ ONLY: CPT | Mod: S$PBB,,, | Performed by: INTERNAL MEDICINE

## 2019-04-05 PROCEDURE — 93018 TREADMILL STRESS TEST (CUPID ONLY): ICD-10-PCS | Mod: S$PBB,,, | Performed by: INTERNAL MEDICINE

## 2019-04-05 PROCEDURE — 88175 CYTOPATH C/V AUTO FLUID REDO: CPT

## 2019-04-05 PROCEDURE — 80053 COMPREHEN METABOLIC PANEL: CPT

## 2019-04-05 PROCEDURE — 99999 PR PBB SHADOW E&M-EST. PATIENT-LVL I: CPT | Mod: PBBFAC,,,

## 2019-04-05 PROCEDURE — 71046 X-RAY EXAM CHEST 2 VIEWS: CPT | Mod: 26,,, | Performed by: RADIOLOGY

## 2019-04-05 PROCEDURE — 80061 LIPID PANEL: CPT

## 2019-04-05 PROCEDURE — 77067 SCR MAMMO BI INCL CAD: CPT | Mod: TC,PO

## 2019-04-05 PROCEDURE — 85027 COMPLETE CBC AUTOMATED: CPT

## 2019-04-05 PROCEDURE — 77067 SCR MAMMO BI INCL CAD: CPT | Mod: 26,,, | Performed by: RADIOLOGY

## 2019-04-05 PROCEDURE — 99999 PR PBB SHADOW E&M-EST. PATIENT-LVL III: CPT | Mod: PBBFAC,,, | Performed by: OBSTETRICS & GYNECOLOGY

## 2019-04-05 PROCEDURE — 99499 NO LOS: ICD-10-PCS | Mod: S$GLB,,, | Performed by: FAMILY MEDICINE

## 2019-04-05 PROCEDURE — 93017 CV STRESS TEST TRACING ONLY: CPT | Mod: PBBFAC,PO | Performed by: INTERNAL MEDICINE

## 2019-04-05 PROCEDURE — 77080 DXA BONE DENSITY AXIAL: CPT | Mod: 26,,, | Performed by: RADIOLOGY

## 2019-04-05 PROCEDURE — 77080 DXA BONE DENSITY AXIAL: CPT | Mod: TC,PO

## 2019-04-05 PROCEDURE — 77080 DEXA BONE DENSITY SPINE HIP: ICD-10-PCS | Mod: 26,,, | Performed by: RADIOLOGY

## 2019-04-05 PROCEDURE — 93018 CV STRESS TEST I&R ONLY: CPT | Mod: S$PBB,,, | Performed by: INTERNAL MEDICINE

## 2019-04-05 PROCEDURE — 99499 UNLISTED E&M SERVICE: CPT | Mod: S$GLB,,, | Performed by: FAMILY MEDICINE

## 2019-04-05 RX ORDER — LINACLOTIDE 145 UG/1
CAPSULE, GELATIN COATED ORAL
Refills: 3 | COMMUNITY
Start: 2019-03-20 | End: 2020-06-05

## 2019-04-05 NOTE — PROGRESS NOTES
"HPI:   68 y.o.   OB History        3    Para   3    Term   2            AB        Living   1       SAB        TAB        Ectopic        Multiple        Live Births   1              No LMP recorded. Patient is postmenopausal.    Patient is  here for her annual gynecologic exam.  She has no complaints.     ROS:  GENERAL: No fever, chills, fatigability or weight loss.  SKIN: No rashes, itching or changes in color or texture of skin.  HEAD: No headaches or recent head trauma.  EYES: Visual acuity fine. No photophobia, ocular pain or diplopia.  EARS: Denies ear pain, discharge or vertigo.  NOSE: No loss of smell, no epistaxis or postnasal drip.  MOUTH & THROAT: No hoarseness or change in voice. No excessive gum bleeding.  NODES: Denies swollen glands.  CHEST: Denies MANCERA, cyanosis, wheezing, cough and sputum production.  CARDIOVASCULAR: Denies chest pain, PND, orthopnea or reduced exercise tolerance.  ABDOMEN: Appetite fine. No weight loss. Denies diarrhea, abdominal pain, hematemesis or blood in stool.  URINARY: No flank pain, dysuria or hematuria.  PERIPHERAL VASCULAR: No claudication or cyanosis.  MUSCULOSKELETAL: No joint stiffness or swelling. Denies back pain.  NEUROLOGIC: No history of seizures, paralysis, alteration of gait or coordination.    PE:   /82   Ht 5' 3" (1.6 m)   Wt 58.2 kg (128 lb 4.9 oz)   BMI 22.73 kg/m²   APPEARANCE: Well nourished, well developed, in no acute distress.  NECK: Neck symmetric without masses or thyromegaly.  BREASTS: Symmetrical, no skin changes or visible lesions. No palpable masses, nipple discharge or adenopathy bilaterally.  ABDOMEN: Flat. Soft. No tenderness or masses. No hepatosplenomegaly. No hernias. No CVA tenderness.  VULVA: No lesions. Normal female genitalia.  URETHRAL MEATUS: Normal size and location, no lesions, no prolapse.  URETHRA: No masses, tenderness, prolapse or scarring.  VAGINA: Moist and well rugated, no discharge, no significant " cystocele or rectocele.  CERVIX: No lesions and discharge. PAP done.  UTERUS: Normal size, regular shape, mobile, non-tender, bladder base nontender.  ADNEXA: No masses, tenderness or CDS nodularity.  ANUS PERINEUM: Normal.    PROCEDURES:  Pap smear    Assessment:  Normal Gynecologic Exam    Plan:  Mammogram and Colonoscopy if indicated by current recommendations.  Return to clinic in one year or for any problems or complaints.  No gyn co

## 2019-04-09 ENCOUNTER — OFFICE VISIT (OUTPATIENT)
Dept: FAMILY MEDICINE | Facility: CLINIC | Age: 69
End: 2019-04-09
Payer: MEDICARE

## 2019-04-09 VITALS
HEIGHT: 63 IN | BODY MASS INDEX: 23.2 KG/M2 | DIASTOLIC BLOOD PRESSURE: 70 MMHG | WEIGHT: 130.94 LBS | HEART RATE: 64 BPM | SYSTOLIC BLOOD PRESSURE: 108 MMHG

## 2019-04-09 DIAGNOSIS — E78.2 MIXED HYPERLIPIDEMIA: ICD-10-CM

## 2019-04-09 DIAGNOSIS — Z00.00 WELLNESS EXAMINATION: Primary | ICD-10-CM

## 2019-04-09 DIAGNOSIS — I10 ESSENTIAL HYPERTENSION: ICD-10-CM

## 2019-04-09 PROCEDURE — 99999 PR PBB SHADOW E&M-EST. PATIENT-LVL III: CPT | Mod: PBBFAC,,, | Performed by: FAMILY MEDICINE

## 2019-04-09 PROCEDURE — 99999 PR PBB SHADOW E&M-EST. PATIENT-LVL III: ICD-10-PCS | Mod: PBBFAC,,, | Performed by: FAMILY MEDICINE

## 2019-04-09 PROCEDURE — 99397 PER PM REEVAL EST PAT 65+ YR: CPT | Mod: S$PBB,,, | Performed by: FAMILY MEDICINE

## 2019-04-09 PROCEDURE — 99397 PR PREVENTIVE VISIT,EST,65 & OVER: ICD-10-PCS | Mod: S$PBB,,, | Performed by: FAMILY MEDICINE

## 2019-04-09 RX ORDER — TURMERIC 400 MG
CAPSULE ORAL
COMMUNITY
End: 2021-06-04

## 2019-04-09 NOTE — PROGRESS NOTES
Subjective:       Patient ID: Gricelda Hassan is a 68 y.o. female.    Chief Complaint: Executive Health    Here for executive health exam. Doing well overall and in her normal state of health.     Hypertension   This is a chronic problem. The current episode started more than 1 year ago. The problem is controlled. Pertinent negatives include no chest pain, palpitations or shortness of breath.   Hyperlipidemia   This is a chronic problem. The current episode started more than 1 year ago. The problem is controlled. Pertinent negatives include no chest pain or shortness of breath.     Review of Systems   Constitutional: Negative for chills, fatigue and fever.   Respiratory: Negative for cough, chest tightness and shortness of breath.    Cardiovascular: Negative for chest pain, palpitations and leg swelling.   Endocrine: Negative for cold intolerance and heat intolerance.   Skin: Negative for rash.   Psychiatric/Behavioral: Negative for dysphoric mood. The patient is not nervous/anxious.        Objective:      Physical Exam   Constitutional: She appears well-developed and well-nourished.   HENT:   Head: Normocephalic and atraumatic.   Cardiovascular: Normal rate, regular rhythm and normal heart sounds.   Pulmonary/Chest: Effort normal and breath sounds normal.   Psychiatric: She has a normal mood and affect.   Nursing note and vitals reviewed.      Assessment:       1. Wellness examination    2. Essential hypertension    3. Mixed hyperlipidemia        Plan:       Wellness examination    Essential hypertension    Mixed hyperlipidemia      Thank you for choosing me for your executive health exam. I have reviewed your labs and they are in acceptable range. You are in good health and I encourage regular exercise with a diet of healthy protein and plenty of fresh fruits and vegetables. I do recommend you follow up with your pcp for any preventative health items you may need including hepatitis C screening and screening  colonoscopy as well as pneumonia vaccines as those are not in the scope of the executive health exam.  Do not hesitate to contact my office with any questions.    Thank you,    Dr. Grande    Follow up in about 1 year (around 4/9/2020), or if symptoms worsen or fail to improve.

## 2019-05-29 ENCOUNTER — TELEPHONE (OUTPATIENT)
Dept: FAMILY MEDICINE | Facility: CLINIC | Age: 69
End: 2019-05-29

## 2019-05-29 NOTE — TELEPHONE ENCOUNTER
----- Message from Elena Brown sent at 5/29/2019  8:42 AM CDT -----  Contact: self  Pt is calling in regards to getting a referral to a cardiologist.    She can be reached at [phone].    Thank you

## 2019-05-29 NOTE — TELEPHONE ENCOUNTER
This is an executive health patient, she has seen you one time. Is asking for referral to cardiology. Should she get this from her PCP?

## 2019-05-30 ENCOUNTER — PATIENT MESSAGE (OUTPATIENT)
Dept: FAMILY MEDICINE | Facility: CLINIC | Age: 69
End: 2019-05-30

## 2019-05-30 NOTE — TELEPHONE ENCOUNTER
Phoned pt in regards to referral for cardiologist per her PCP. LMOR to call [facility]# for any questions. Pt only seen as executive health pt. Needs referral from PCP. CLC

## 2020-02-26 DIAGNOSIS — Z00.00 ANNUAL PHYSICAL EXAM: Primary | ICD-10-CM

## 2020-02-26 DIAGNOSIS — Z12.31 ENCOUNTER FOR SCREENING MAMMOGRAM FOR MALIGNANT NEOPLASM OF BREAST: ICD-10-CM

## 2020-04-20 ENCOUNTER — PATIENT MESSAGE (OUTPATIENT)
Dept: INTERNAL MEDICINE | Facility: CLINIC | Age: 70
End: 2020-04-20

## 2020-06-05 ENCOUNTER — HOSPITAL ENCOUNTER (OUTPATIENT)
Dept: RADIOLOGY | Facility: HOSPITAL | Age: 70
Discharge: HOME | End: 2020-06-05
Attending: FAMILY MEDICINE
Payer: MEDICARE

## 2020-06-05 ENCOUNTER — OFFICE VISIT (OUTPATIENT)
Dept: FAMILY MEDICINE | Facility: CLINIC | Age: 70
End: 2020-06-05
Payer: MEDICARE

## 2020-06-05 ENCOUNTER — OFFICE VISIT (OUTPATIENT)
Dept: OBSTETRICS AND GYNECOLOGY | Facility: CLINIC | Age: 70
End: 2020-06-05
Payer: MEDICARE

## 2020-06-05 ENCOUNTER — CLINICAL SUPPORT (OUTPATIENT)
Dept: CARDIOLOGY | Facility: CLINIC | Age: 70
End: 2020-06-05
Payer: MEDICARE

## 2020-06-05 ENCOUNTER — CLINICAL SUPPORT (OUTPATIENT)
Dept: INTERNAL MEDICINE | Facility: CLINIC | Age: 70
End: 2020-06-05
Payer: MEDICARE

## 2020-06-05 VITALS — BODY MASS INDEX: 23.2 KG/M2 | HEIGHT: 63 IN | WEIGHT: 130.94 LBS

## 2020-06-05 VITALS
WEIGHT: 126.31 LBS | BODY MASS INDEX: 22.38 KG/M2 | HEART RATE: 67 BPM | SYSTOLIC BLOOD PRESSURE: 130 MMHG | DIASTOLIC BLOOD PRESSURE: 80 MMHG | TEMPERATURE: 98 F | OXYGEN SATURATION: 97 % | HEIGHT: 63 IN

## 2020-06-05 VITALS
HEIGHT: 63 IN | DIASTOLIC BLOOD PRESSURE: 80 MMHG | BODY MASS INDEX: 22.43 KG/M2 | WEIGHT: 126.56 LBS | SYSTOLIC BLOOD PRESSURE: 129 MMHG

## 2020-06-05 DIAGNOSIS — Z00.00 ANNUAL PHYSICAL EXAM: ICD-10-CM

## 2020-06-05 DIAGNOSIS — Z00.00 ANNUAL PHYSICAL EXAM: Primary | ICD-10-CM

## 2020-06-05 DIAGNOSIS — E78.2 MIXED HYPERLIPIDEMIA: ICD-10-CM

## 2020-06-05 DIAGNOSIS — I10 ESSENTIAL HYPERTENSION: ICD-10-CM

## 2020-06-05 DIAGNOSIS — Z12.31 ENCOUNTER FOR SCREENING MAMMOGRAM FOR MALIGNANT NEOPLASM OF BREAST: ICD-10-CM

## 2020-06-05 DIAGNOSIS — Z01.419 WELL WOMAN EXAM WITH ROUTINE GYNECOLOGICAL EXAM: ICD-10-CM

## 2020-06-05 DIAGNOSIS — Z00.00 WELLNESS EXAMINATION: Primary | ICD-10-CM

## 2020-06-05 DIAGNOSIS — Z12.4 ROUTINE PAPANICOLAOU SMEAR: Primary | ICD-10-CM

## 2020-06-05 LAB
ALBUMIN SERPL BCP-MCNC: 4.1 G/DL (ref 3.5–5.2)
ALP SERPL-CCNC: 53 U/L (ref 55–135)
ALT SERPL W/O P-5'-P-CCNC: 17 U/L (ref 10–44)
ANION GAP SERPL CALC-SCNC: 8 MMOL/L (ref 8–16)
AST SERPL-CCNC: 19 U/L (ref 10–40)
BILIRUB SERPL-MCNC: 0.5 MG/DL (ref 0.1–1)
BUN SERPL-MCNC: 12 MG/DL (ref 8–23)
CALCIUM SERPL-MCNC: 8.8 MG/DL (ref 8.7–10.5)
CHLORIDE SERPL-SCNC: 104 MMOL/L (ref 95–110)
CO2 SERPL-SCNC: 26 MMOL/L (ref 23–29)
CREAT SERPL-MCNC: 0.8 MG/DL (ref 0.5–1.4)
ERYTHROCYTE [DISTWIDTH] IN BLOOD BY AUTOMATED COUNT: 13.4 % (ref 11.5–14.5)
EST. GFR  (AFRICAN AMERICAN): >60 ML/MIN/1.73 M^2
EST. GFR  (NON AFRICAN AMERICAN): >60 ML/MIN/1.73 M^2
GLUCOSE SERPL-MCNC: 89 MG/DL (ref 70–110)
HCT VFR BLD AUTO: 40.3 % (ref 37–48.5)
HGB BLD-MCNC: 13.3 G/DL (ref 12–16)
MCH RBC QN AUTO: 33 PG (ref 27–31)
MCHC RBC AUTO-ENTMCNC: 33 G/DL (ref 32–36)
MCV RBC AUTO: 100 FL (ref 82–98)
PLATELET # BLD AUTO: 301 K/UL (ref 150–350)
PMV BLD AUTO: 9.9 FL (ref 9.2–12.9)
POTASSIUM SERPL-SCNC: 4.2 MMOL/L (ref 3.5–5.1)
PROT SERPL-MCNC: 6.9 G/DL (ref 6–8.4)
RBC # BLD AUTO: 4.03 M/UL (ref 4–5.4)
SODIUM SERPL-SCNC: 138 MMOL/L (ref 136–145)
WBC # BLD AUTO: 4.36 K/UL (ref 3.9–12.7)

## 2020-06-05 PROCEDURE — 99397 PER PM REEVAL EST PAT 65+ YR: CPT | Mod: S$PBB,,, | Performed by: FAMILY MEDICINE

## 2020-06-05 PROCEDURE — 99397 PER PM REEVAL EST PAT 65+ YR: CPT | Mod: S$PBB,,, | Performed by: OBSTETRICS & GYNECOLOGY

## 2020-06-05 PROCEDURE — 93005 ELECTROCARDIOGRAM TRACING: CPT | Mod: PBBFAC,PO | Performed by: INTERNAL MEDICINE

## 2020-06-05 PROCEDURE — 77063 BREAST TOMOSYNTHESIS BI: CPT | Mod: 26,,, | Performed by: RADIOLOGY

## 2020-06-05 PROCEDURE — 99397 PR PREVENTIVE VISIT,EST,65 & OVER: ICD-10-PCS | Mod: S$PBB,,, | Performed by: FAMILY MEDICINE

## 2020-06-05 PROCEDURE — 77063 MAMMO DIGITAL SCREENING BILAT WITH TOMOSYNTHESIS_CAD: ICD-10-PCS | Mod: 26,,, | Performed by: RADIOLOGY

## 2020-06-05 PROCEDURE — 99999 PR PBB SHADOW E&M-EST. PATIENT-LVL III: CPT | Mod: PBBFAC,,, | Performed by: FAMILY MEDICINE

## 2020-06-05 PROCEDURE — 99999 PR PBB SHADOW E&M-EST. PATIENT-LVL I: CPT | Mod: PBBFAC,,,

## 2020-06-05 PROCEDURE — 77067 MAMMO DIGITAL SCREENING BILAT WITH TOMOSYNTHESIS_CAD: ICD-10-PCS | Mod: 26,,, | Performed by: RADIOLOGY

## 2020-06-05 PROCEDURE — 93010 EKG 12-LEAD: ICD-10-PCS | Mod: S$PBB,,, | Performed by: INTERNAL MEDICINE

## 2020-06-05 PROCEDURE — 77067 SCR MAMMO BI INCL CAD: CPT | Mod: TC,PO

## 2020-06-05 PROCEDURE — 93010 ELECTROCARDIOGRAM REPORT: CPT | Mod: S$PBB,,, | Performed by: INTERNAL MEDICINE

## 2020-06-05 PROCEDURE — 71046 X-RAY EXAM CHEST 2 VIEWS: CPT | Mod: 26,,, | Performed by: RADIOLOGY

## 2020-06-05 PROCEDURE — 99213 OFFICE O/P EST LOW 20 MIN: CPT | Mod: PBBFAC,25,27,PO | Performed by: FAMILY MEDICINE

## 2020-06-05 PROCEDURE — 99999 PR PBB SHADOW E&M-EST. PATIENT-LVL III: ICD-10-PCS | Mod: PBBFAC,,, | Performed by: FAMILY MEDICINE

## 2020-06-05 PROCEDURE — 99397 PR PREVENTIVE VISIT,EST,65 & OVER: ICD-10-PCS | Mod: S$PBB,,, | Performed by: OBSTETRICS & GYNECOLOGY

## 2020-06-05 PROCEDURE — 99213 OFFICE O/P EST LOW 20 MIN: CPT | Mod: PBBFAC,25,PO | Performed by: OBSTETRICS & GYNECOLOGY

## 2020-06-05 PROCEDURE — 99999 PR PBB SHADOW E&M-EST. PATIENT-LVL III: CPT | Mod: PBBFAC,,, | Performed by: OBSTETRICS & GYNECOLOGY

## 2020-06-05 PROCEDURE — 85027 COMPLETE CBC AUTOMATED: CPT | Mod: PO

## 2020-06-05 PROCEDURE — 99999 PR PBB SHADOW E&M-EST. PATIENT-LVL III: ICD-10-PCS | Mod: PBBFAC,,, | Performed by: OBSTETRICS & GYNECOLOGY

## 2020-06-05 PROCEDURE — 99999 PR PBB SHADOW E&M-EST. PATIENT-LVL I: ICD-10-PCS | Mod: PBBFAC,,,

## 2020-06-05 PROCEDURE — 99211 OFF/OP EST MAY X REQ PHY/QHP: CPT | Mod: PBBFAC,25,27,PO

## 2020-06-05 PROCEDURE — 71046 X-RAY EXAM CHEST 2 VIEWS: CPT | Mod: TC,FY,PO

## 2020-06-05 PROCEDURE — 88175 CYTOPATH C/V AUTO FLUID REDO: CPT

## 2020-06-05 PROCEDURE — 80061 LIPID PANEL: CPT

## 2020-06-05 PROCEDURE — 77067 SCR MAMMO BI INCL CAD: CPT | Mod: 26,,, | Performed by: RADIOLOGY

## 2020-06-05 PROCEDURE — 80053 COMPREHEN METABOLIC PANEL: CPT | Mod: PO

## 2020-06-05 PROCEDURE — 71046 XR CHEST PA AND LATERAL: ICD-10-PCS | Mod: 26,,, | Performed by: RADIOLOGY

## 2020-06-05 RX ORDER — ASPIRIN 325 MG
162.5 TABLET ORAL DAILY
COMMUNITY
End: 2022-12-19

## 2020-06-05 NOTE — PROGRESS NOTES
Subjective:       Patient ID: Gricelda Hassan is a 69 y.o. female.    Chief Complaint: Annual Exam (AREX )    Here for executive health exam. In her normal state of health. Follows with cardiology still.     Hypertension   This is a chronic problem. The current episode started more than 1 year ago. The problem is controlled. Pertinent negatives include no chest pain, palpitations or shortness of breath.   Hyperlipidemia   This is a chronic problem. The current episode started more than 1 year ago. The problem is controlled. Pertinent negatives include no chest pain or shortness of breath.     Review of Systems   Constitutional: Negative for chills and fever.   Respiratory: Negative for cough, chest tightness and shortness of breath.    Cardiovascular: Negative for chest pain, palpitations and leg swelling.   Endocrine: Negative for cold intolerance and heat intolerance.   Psychiatric/Behavioral: Negative for decreased concentration. The patient is not nervous/anxious.        Objective:      Physical Exam   Constitutional: She appears well-developed and well-nourished.   HENT:   Head: Normocephalic and atraumatic.   Cardiovascular: Normal rate, regular rhythm and normal heart sounds.   Pulmonary/Chest: Effort normal and breath sounds normal.   Psychiatric: She has a normal mood and affect.   Nursing note and vitals reviewed.      Assessment:       1. Wellness examination    2. Essential hypertension    3. Mixed hyperlipidemia        Plan:       Wellness examination    Essential hypertension    Mixed hyperlipidemia        Thank you for choosing me for your executive health exam.  Check with your GI doctor regarding colonoscopy screening or I would be happy to refer locally.   I have reviewed your labs, xray and ekg and all are in good range.   Do not hesitate to contact me with any questions.     Thank you,        Dr. Grande    Follow up in about 1 year (around 6/5/2021), or if symptoms worsen or fail to improve.

## 2020-06-05 NOTE — PROGRESS NOTES
"HPI:   69 y.o.   OB History        3    Para   3    Term   2            AB        Living   1       SAB        TAB        Ectopic        Multiple        Live Births   1              No LMP recorded. Patient is postmenopausal.    Patient is  here for her annual gynecologic exam.  She has no complaints.     ROS:  GENERAL: No fever, chills, fatigability or weight loss.  SKIN: No rashes, itching or changes in color or texture of skin.  HEAD: No headaches or recent head trauma.  EYES: Visual acuity fine. No photophobia, ocular pain or diplopia.  EARS: Denies ear pain, discharge or vertigo.  NOSE: No loss of smell, no epistaxis or postnasal drip.  MOUTH & THROAT: No hoarseness or change in voice. No excessive gum bleeding.  NODES: Denies swollen glands.  CHEST: Denies MANCERA, cyanosis, wheezing, cough and sputum production.  CARDIOVASCULAR: Denies chest pain, PND, orthopnea or reduced exercise tolerance.  ABDOMEN: Appetite fine. No weight loss. Denies diarrhea, abdominal pain, hematemesis or blood in stool.  URINARY: No flank pain, dysuria or hematuria.  PERIPHERAL VASCULAR: No claudication or cyanosis.  MUSCULOSKELETAL: No joint stiffness or swelling. Denies back pain.  NEUROLOGIC: No history of seizures, paralysis, alteration of gait or coordination.    PE:   /80   Ht 5' 3" (1.6 m)   Wt 57.4 kg (126 lb 8.7 oz)   BMI 22.42 kg/m²   APPEARANCE: Well nourished, well developed, in no acute distress.  NECK: Neck symmetric without masses or thyromegaly.  BREASTS: Symmetrical, no skin changes or visible lesions. No palpable masses, nipple discharge or adenopathy bilaterally.  ABDOMEN: Flat. Soft. No tenderness or masses. No hepatosplenomegaly. No hernias. No CVA tenderness.  VULVA: No lesions. Normal female genitalia.  URETHRAL MEATUS: Normal size and location, no lesions, no prolapse.  URETHRA: No masses, tenderness, prolapse or scarring.  VAGINA: Moist and well rugated, no discharge, no significant " cystocele or rectocele.  CERVIX: No lesions and discharge. PAP done.  UTERUS: Normal size, regular shape, mobile, non-tender, bladder base nontender.  ADNEXA: No masses, tenderness or CDS nodularity.  ANUS PERINEUM: Normal.    PROCEDURES:  Pap smear    Assessment:  Normal Gynecologic Exam    Plan:  Mammogram and Colonoscopy if indicated by current recommendations.  Return to clinic in one year or for any problems or complaints.  No gyn co

## 2020-06-05 NOTE — LETTER
June 5, 2020      KYREE Grande MD  [address] [facility] [address] [place] [address]           [facility]  [address] [place] [address]  Phone: [phone]  Fax: [phone]          Patient: Gricelda Hassan   MR Number: [id number]   YOB: 1950   Date of Visit: 6/5/2020       Dear Dr. KYREE Grande:    Thank you for referring Gricelda Hassan to me for evaluation. Attached you will find relevant portions of my assessment and plan of care.    If you have questions, please do not hesitate to call me. I look forward to following Gricelda Hassan along with you.    Sincerely,    Michael A. Wiedemann, MD    Enclosure  CC:  No Recipients    If you would like to receive this communication electronically, please contact [email or web address] or [phone] to request more information on [facility] access.    For providers and/or their staff who would like to refer a patient to [facility], please contact us through our one-stop-shop provider referral line, [facility], at [phone].    If you feel you have received this communication in error or would no longer like to receive these types of communications, please e-mail [email or web address]

## 2020-06-06 LAB
CHOLEST SERPL-MCNC: 199 MG/DL (ref 120–199)
CHOLEST/HDLC SERPL: 2.1 {RATIO} (ref 2–5)
HDLC SERPL-MCNC: 95 MG/DL (ref 40–75)
HDLC SERPL: 47.7 % (ref 20–50)
LDLC SERPL CALC-MCNC: 95.4 MG/DL (ref 63–159)
NONHDLC SERPL-MCNC: 104 MG/DL
TRIGL SERPL-MCNC: 43 MG/DL (ref 30–150)

## 2020-06-11 LAB
FINAL PATHOLOGIC DIAGNOSIS: NORMAL
Lab: NORMAL

## 2021-04-13 ENCOUNTER — TELEPHONE (OUTPATIENT)
Dept: OBSTETRICS AND GYNECOLOGY | Facility: CLINIC | Age: 71
End: 2021-04-13

## 2021-04-13 DIAGNOSIS — Z12.31 ENCOUNTER FOR SCREENING MAMMOGRAM FOR MALIGNANT NEOPLASM OF BREAST: Primary | ICD-10-CM

## 2021-06-04 ENCOUNTER — OFFICE VISIT (OUTPATIENT)
Dept: OBSTETRICS AND GYNECOLOGY | Facility: CLINIC | Age: 71
End: 2021-06-04
Payer: MEDICARE

## 2021-06-04 VITALS
HEIGHT: 63 IN | DIASTOLIC BLOOD PRESSURE: 79 MMHG | WEIGHT: 129.88 LBS | BODY MASS INDEX: 23.01 KG/M2 | SYSTOLIC BLOOD PRESSURE: 126 MMHG

## 2021-06-04 DIAGNOSIS — Z12.31 ENCOUNTER FOR SCREENING MAMMOGRAM FOR MALIGNANT NEOPLASM OF BREAST: ICD-10-CM

## 2021-06-04 DIAGNOSIS — Z01.419 WELL WOMAN EXAM WITH ROUTINE GYNECOLOGICAL EXAM: Primary | ICD-10-CM

## 2021-06-04 PROCEDURE — 99213 OFFICE O/P EST LOW 20 MIN: CPT | Mod: PBBFAC,PO | Performed by: OBSTETRICS & GYNECOLOGY

## 2021-06-04 PROCEDURE — G0101 CA SCREEN;PELVIC/BREAST EXAM: HCPCS | Mod: S$PBB,,, | Performed by: OBSTETRICS & GYNECOLOGY

## 2021-06-04 PROCEDURE — 99999 PR PBB SHADOW E&M-EST. PATIENT-LVL III: CPT | Mod: PBBFAC,,, | Performed by: OBSTETRICS & GYNECOLOGY

## 2021-06-04 PROCEDURE — 88175 CYTOPATH C/V AUTO FLUID REDO: CPT | Performed by: OBSTETRICS & GYNECOLOGY

## 2021-06-04 PROCEDURE — G0101 PR CA SCREEN;PELVIC/BREAST EXAM: ICD-10-PCS | Mod: S$PBB,,, | Performed by: OBSTETRICS & GYNECOLOGY

## 2021-06-04 PROCEDURE — G0101 CA SCREEN;PELVIC/BREAST EXAM: HCPCS | Mod: PBBFAC,PO | Performed by: OBSTETRICS & GYNECOLOGY

## 2021-06-04 PROCEDURE — 99999 PR PBB SHADOW E&M-EST. PATIENT-LVL III: ICD-10-PCS | Mod: PBBFAC,,, | Performed by: OBSTETRICS & GYNECOLOGY

## 2021-06-04 RX ORDER — UBIDECARENONE 75 MG
CAPSULE ORAL
COMMUNITY
Start: 2020-07-13

## 2021-06-04 RX ORDER — PROPAFENONE HYDROCHLORIDE 225 MG/1
CAPSULE, EXTENDED RELEASE ORAL
Status: ON HOLD | COMMUNITY
Start: 2020-04-01 | End: 2022-02-07 | Stop reason: HOSPADM

## 2021-06-04 RX ORDER — ASCORBIC ACID 250 MG
TABLET,CHEWABLE ORAL
COMMUNITY
Start: 2020-07-13 | End: 2021-07-06

## 2021-06-07 ENCOUNTER — HOSPITAL ENCOUNTER (OUTPATIENT)
Dept: RADIOLOGY | Facility: HOSPITAL | Age: 71
Discharge: HOME | End: 2021-06-07
Attending: OBSTETRICS & GYNECOLOGY
Payer: MEDICARE

## 2021-06-07 DIAGNOSIS — Z12.31 ENCOUNTER FOR SCREENING MAMMOGRAM FOR MALIGNANT NEOPLASM OF BREAST: ICD-10-CM

## 2021-06-07 PROCEDURE — 77063 BREAST TOMOSYNTHESIS BI: CPT | Mod: 26,,, | Performed by: RADIOLOGY

## 2021-06-07 PROCEDURE — 77067 SCR MAMMO BI INCL CAD: CPT | Mod: TC,PO

## 2021-06-07 PROCEDURE — 77067 SCR MAMMO BI INCL CAD: CPT | Mod: 26,,, | Performed by: RADIOLOGY

## 2021-06-07 PROCEDURE — 77067 MAMMO DIGITAL SCREENING BILAT WITH TOMO: ICD-10-PCS | Mod: 26,,, | Performed by: RADIOLOGY

## 2021-06-07 PROCEDURE — 77063 MAMMO DIGITAL SCREENING BILAT WITH TOMO: ICD-10-PCS | Mod: 26,,, | Performed by: RADIOLOGY

## 2021-06-10 ENCOUNTER — TELEPHONE (OUTPATIENT)
Dept: HEMATOLOGY/ONCOLOGY | Facility: CLINIC | Age: 71
End: 2021-06-10

## 2021-06-10 LAB
FINAL PATHOLOGIC DIAGNOSIS: NORMAL
Lab: NORMAL

## 2021-06-11 ENCOUNTER — TELEPHONE (OUTPATIENT)
Dept: HEMATOLOGY/ONCOLOGY | Facility: CLINIC | Age: 71
End: 2021-06-11

## 2021-06-18 ENCOUNTER — DOCUMENTATION ONLY (OUTPATIENT)
Dept: HEMATOLOGY/ONCOLOGY | Facility: CLINIC | Age: 71
End: 2021-06-18

## 2021-07-02 ENCOUNTER — TELEPHONE (OUTPATIENT)
Dept: HEMATOLOGY/ONCOLOGY | Facility: CLINIC | Age: 71
End: 2021-07-02

## 2021-07-06 ENCOUNTER — LAB VISIT (OUTPATIENT)
Dept: LAB | Facility: HOSPITAL | Age: 71
End: 2021-07-06
Attending: INTERNAL MEDICINE
Payer: MEDICARE

## 2021-07-06 ENCOUNTER — OFFICE VISIT (OUTPATIENT)
Dept: HEMATOLOGY/ONCOLOGY | Facility: CLINIC | Age: 71
End: 2021-07-06
Payer: MEDICARE

## 2021-07-06 VITALS
HEIGHT: 63 IN | BODY MASS INDEX: 22.69 KG/M2 | TEMPERATURE: 98 F | SYSTOLIC BLOOD PRESSURE: 130 MMHG | HEART RATE: 73 BPM | DIASTOLIC BLOOD PRESSURE: 80 MMHG | OXYGEN SATURATION: 100 % | WEIGHT: 128.06 LBS

## 2021-07-06 DIAGNOSIS — I10 ESSENTIAL HYPERTENSION: ICD-10-CM

## 2021-07-06 DIAGNOSIS — D75.89 MACROCYTOSIS: Primary | ICD-10-CM

## 2021-07-06 DIAGNOSIS — D75.89 MACROCYTOSIS: ICD-10-CM

## 2021-07-06 DIAGNOSIS — I47.19 ATRIAL TACHYCARDIA: ICD-10-CM

## 2021-07-06 DIAGNOSIS — E78.5 HYPERLIPIDEMIA, UNSPECIFIED: ICD-10-CM

## 2021-07-06 LAB
ALBUMIN SERPL BCP-MCNC: 3.5 G/DL (ref 3.5–5.2)
ALP SERPL-CCNC: 61 U/L (ref 55–135)
ALT SERPL W/O P-5'-P-CCNC: 19 U/L (ref 10–44)
ANION GAP SERPL CALC-SCNC: 10 MMOL/L (ref 8–16)
AST SERPL-CCNC: 20 U/L (ref 10–40)
BASOPHILS # BLD AUTO: 0.04 K/UL (ref 0–0.2)
BASOPHILS NFR BLD: 0.6 % (ref 0–1.9)
BILIRUB SERPL-MCNC: 0.3 MG/DL (ref 0.1–1)
BUN SERPL-MCNC: 14 MG/DL (ref 8–23)
CALCIUM SERPL-MCNC: 9.2 MG/DL (ref 8.7–10.5)
CHLORIDE SERPL-SCNC: 102 MMOL/L (ref 95–110)
CO2 SERPL-SCNC: 25 MMOL/L (ref 23–29)
CREAT SERPL-MCNC: 0.9 MG/DL (ref 0.5–1.4)
DIFFERENTIAL METHOD: ABNORMAL
EOSINOPHIL # BLD AUTO: 0.2 K/UL (ref 0–0.5)
EOSINOPHIL NFR BLD: 2.5 % (ref 0–8)
ERYTHROCYTE [DISTWIDTH] IN BLOOD BY AUTOMATED COUNT: 13 % (ref 11.5–14.5)
EST. GFR  (AFRICAN AMERICAN): >60 ML/MIN/1.73 M^2
EST. GFR  (NON AFRICAN AMERICAN): >60 ML/MIN/1.73 M^2
GLUCOSE SERPL-MCNC: 95 MG/DL (ref 70–110)
HAPTOGLOB SERPL-MCNC: 94 MG/DL (ref 30–250)
HCT VFR BLD AUTO: 37.9 % (ref 37–48.5)
HCYS SERPL-SCNC: 5.6 UMOL/L (ref 4–15.5)
HGB BLD-MCNC: 12.6 G/DL (ref 12–16)
IMM GRANULOCYTES # BLD AUTO: 0.02 K/UL (ref 0–0.04)
IMM GRANULOCYTES NFR BLD AUTO: 0.3 % (ref 0–0.5)
LDH SERPL L TO P-CCNC: 165 U/L (ref 110–260)
LYMPHOCYTES # BLD AUTO: 1.3 K/UL (ref 1–4.8)
LYMPHOCYTES NFR BLD: 20 % (ref 18–48)
MCH RBC QN AUTO: 34.1 PG (ref 27–31)
MCHC RBC AUTO-ENTMCNC: 33.2 G/DL (ref 32–36)
MCV RBC AUTO: 102 FL (ref 82–98)
MONOCYTES # BLD AUTO: 0.5 K/UL (ref 0.3–1)
MONOCYTES NFR BLD: 8 % (ref 4–15)
NEUTROPHILS # BLD AUTO: 4.5 K/UL (ref 1.8–7.7)
NEUTROPHILS NFR BLD: 68.6 % (ref 38–73)
NRBC BLD-RTO: 0 /100 WBC
PATH REV BLD -IMP: NORMAL
PLATELET # BLD AUTO: 293 K/UL (ref 150–450)
PMV BLD AUTO: 9.6 FL (ref 9.2–12.9)
POTASSIUM SERPL-SCNC: 4.5 MMOL/L (ref 3.5–5.1)
PROT SERPL-MCNC: 6.5 G/DL (ref 6–8.4)
RBC # BLD AUTO: 3.7 M/UL (ref 4–5.4)
RETICS/RBC NFR AUTO: 1.4 % (ref 0.5–2.5)
SODIUM SERPL-SCNC: 137 MMOL/L (ref 136–145)
TSH SERPL DL<=0.005 MIU/L-ACNC: 1.35 UIU/ML (ref 0.4–4)
WBC # BLD AUTO: 6.49 K/UL (ref 3.9–12.7)

## 2021-07-06 PROCEDURE — 83090 ASSAY OF HOMOCYSTEINE: CPT | Performed by: INTERNAL MEDICINE

## 2021-07-06 PROCEDURE — 36415 COLL VENOUS BLD VENIPUNCTURE: CPT | Mod: PN | Performed by: INTERNAL MEDICINE

## 2021-07-06 PROCEDURE — 85045 AUTOMATED RETICULOCYTE COUNT: CPT | Mod: PN | Performed by: INTERNAL MEDICINE

## 2021-07-06 PROCEDURE — 85025 COMPLETE CBC W/AUTO DIFF WBC: CPT | Mod: PN | Performed by: INTERNAL MEDICINE

## 2021-07-06 PROCEDURE — 83615 LACTATE (LD) (LDH) ENZYME: CPT | Mod: PN | Performed by: INTERNAL MEDICINE

## 2021-07-06 PROCEDURE — 80053 COMPREHEN METABOLIC PANEL: CPT | Mod: PN | Performed by: INTERNAL MEDICINE

## 2021-07-06 PROCEDURE — 99999 PR PBB SHADOW E&M-EST. PATIENT-LVL III: ICD-10-PCS | Mod: PBBFAC,,, | Performed by: INTERNAL MEDICINE

## 2021-07-06 PROCEDURE — 99213 OFFICE O/P EST LOW 20 MIN: CPT | Mod: PBBFAC,PN | Performed by: INTERNAL MEDICINE

## 2021-07-06 PROCEDURE — 83921 ORGANIC ACID SINGLE QUANT: CPT | Performed by: INTERNAL MEDICINE

## 2021-07-06 PROCEDURE — 99999 PR PBB SHADOW E&M-EST. PATIENT-LVL III: CPT | Mod: PBBFAC,,, | Performed by: INTERNAL MEDICINE

## 2021-07-06 PROCEDURE — 99203 OFFICE O/P NEW LOW 30 MIN: CPT | Mod: S$PBB,,, | Performed by: INTERNAL MEDICINE

## 2021-07-06 PROCEDURE — 85060 BLOOD SMEAR INTERPRETATION: CPT | Mod: ,,, | Performed by: PATHOLOGY

## 2021-07-06 PROCEDURE — 99203 PR OFFICE/OUTPT VISIT, NEW, LEVL III, 30-44 MIN: ICD-10-PCS | Mod: S$PBB,,, | Performed by: INTERNAL MEDICINE

## 2021-07-06 PROCEDURE — 84443 ASSAY THYROID STIM HORMONE: CPT | Performed by: INTERNAL MEDICINE

## 2021-07-06 PROCEDURE — 85060 PATHOLOGIST REVIEW: ICD-10-PCS | Mod: ,,, | Performed by: PATHOLOGY

## 2021-07-06 PROCEDURE — 83010 ASSAY OF HAPTOGLOBIN QUANT: CPT | Performed by: INTERNAL MEDICINE

## 2021-07-07 LAB — PATH REV BLD -IMP: NORMAL

## 2021-07-12 LAB — METHYLMALONATE SERPL-SCNC: 0.12 UMOL/L

## 2021-07-16 ENCOUNTER — TELEPHONE (OUTPATIENT)
Dept: HEMATOLOGY/ONCOLOGY | Facility: CLINIC | Age: 71
End: 2021-07-16

## 2021-07-19 ENCOUNTER — OFFICE VISIT (OUTPATIENT)
Dept: HEMATOLOGY/ONCOLOGY | Facility: CLINIC | Age: 71
End: 2021-07-19
Payer: MEDICARE

## 2021-07-19 VITALS
BODY MASS INDEX: 22.3 KG/M2 | WEIGHT: 125.88 LBS | SYSTOLIC BLOOD PRESSURE: 138 MMHG | HEART RATE: 45 BPM | TEMPERATURE: 99 F | DIASTOLIC BLOOD PRESSURE: 86 MMHG | OXYGEN SATURATION: 100 % | HEIGHT: 63 IN

## 2021-07-19 DIAGNOSIS — I47.19 ATRIAL TACHYCARDIA: ICD-10-CM

## 2021-07-19 DIAGNOSIS — D75.89 MACROCYTOSIS: Primary | ICD-10-CM

## 2021-07-19 DIAGNOSIS — E78.5 HYPERLIPIDEMIA, UNSPECIFIED HYPERLIPIDEMIA TYPE: ICD-10-CM

## 2021-07-19 DIAGNOSIS — I10 ESSENTIAL HYPERTENSION: ICD-10-CM

## 2021-07-19 PROCEDURE — 99213 OFFICE O/P EST LOW 20 MIN: CPT | Mod: S$PBB,,, | Performed by: INTERNAL MEDICINE

## 2021-07-19 PROCEDURE — 99999 PR PBB SHADOW E&M-EST. PATIENT-LVL III: CPT | Mod: PBBFAC,,, | Performed by: INTERNAL MEDICINE

## 2021-07-19 PROCEDURE — 99999 PR PBB SHADOW E&M-EST. PATIENT-LVL III: ICD-10-PCS | Mod: PBBFAC,,, | Performed by: INTERNAL MEDICINE

## 2021-07-19 PROCEDURE — 99213 PR OFFICE/OUTPT VISIT, EST, LEVL III, 20-29 MIN: ICD-10-PCS | Mod: S$PBB,,, | Performed by: INTERNAL MEDICINE

## 2021-07-19 PROCEDURE — 99213 OFFICE O/P EST LOW 20 MIN: CPT | Mod: PBBFAC,PN | Performed by: INTERNAL MEDICINE

## 2021-10-19 ENCOUNTER — TELEPHONE (OUTPATIENT)
Dept: HEMATOLOGY/ONCOLOGY | Facility: CLINIC | Age: 71
End: 2021-10-19

## 2021-10-19 ENCOUNTER — LAB VISIT (OUTPATIENT)
Dept: LAB | Facility: HOSPITAL | Age: 71
End: 2021-10-19
Attending: INTERNAL MEDICINE
Payer: MEDICARE

## 2021-10-19 ENCOUNTER — DOCUMENTATION ONLY (OUTPATIENT)
Dept: HEMATOLOGY/ONCOLOGY | Facility: CLINIC | Age: 71
End: 2021-10-19

## 2021-10-19 DIAGNOSIS — D75.89 MACROCYTOSIS: ICD-10-CM

## 2021-10-19 DIAGNOSIS — E87.5 HYPERKALEMIA: Primary | ICD-10-CM

## 2021-10-19 LAB
ALBUMIN SERPL BCP-MCNC: 3.7 G/DL (ref 3.5–5.2)
ALP SERPL-CCNC: 63 U/L (ref 55–135)
ALT SERPL W/O P-5'-P-CCNC: 18 U/L (ref 10–44)
ANION GAP SERPL CALC-SCNC: 8 MMOL/L (ref 8–16)
AST SERPL-CCNC: 20 U/L (ref 10–40)
BASOPHILS # BLD AUTO: 0.04 K/UL (ref 0–0.2)
BASOPHILS NFR BLD: 0.8 % (ref 0–1.9)
BILIRUB SERPL-MCNC: 0.4 MG/DL (ref 0.1–1)
BUN SERPL-MCNC: 13 MG/DL (ref 8–23)
CALCIUM SERPL-MCNC: 9.5 MG/DL (ref 8.7–10.5)
CHLORIDE SERPL-SCNC: 101 MMOL/L (ref 95–110)
CO2 SERPL-SCNC: 26 MMOL/L (ref 23–29)
CREAT SERPL-MCNC: 0.8 MG/DL (ref 0.5–1.4)
DIFFERENTIAL METHOD: ABNORMAL
EOSINOPHIL # BLD AUTO: 0.3 K/UL (ref 0–0.5)
EOSINOPHIL NFR BLD: 5.3 % (ref 0–8)
ERYTHROCYTE [DISTWIDTH] IN BLOOD BY AUTOMATED COUNT: 13.1 % (ref 11.5–14.5)
EST. GFR  (AFRICAN AMERICAN): >60 ML/MIN/1.73 M^2
EST. GFR  (NON AFRICAN AMERICAN): >60 ML/MIN/1.73 M^2
GLUCOSE SERPL-MCNC: 83 MG/DL (ref 70–110)
HCT VFR BLD AUTO: 39.5 % (ref 37–48.5)
HGB BLD-MCNC: 13.1 G/DL (ref 12–16)
IMM GRANULOCYTES # BLD AUTO: 0.01 K/UL (ref 0–0.04)
IMM GRANULOCYTES NFR BLD AUTO: 0.2 % (ref 0–0.5)
LDH SERPL L TO P-CCNC: 139 U/L (ref 110–260)
LYMPHOCYTES # BLD AUTO: 1.6 K/UL (ref 1–4.8)
LYMPHOCYTES NFR BLD: 31.4 % (ref 18–48)
MCH RBC QN AUTO: 33.3 PG (ref 27–31)
MCHC RBC AUTO-ENTMCNC: 33.2 G/DL (ref 32–36)
MCV RBC AUTO: 101 FL (ref 82–98)
MONOCYTES # BLD AUTO: 0.6 K/UL (ref 0.3–1)
MONOCYTES NFR BLD: 11.2 % (ref 4–15)
NEUTROPHILS # BLD AUTO: 2.5 K/UL (ref 1.8–7.7)
NEUTROPHILS NFR BLD: 51.1 % (ref 38–73)
NRBC BLD-RTO: 0 /100 WBC
PLATELET # BLD AUTO: 300 K/UL (ref 150–450)
PMV BLD AUTO: 9.6 FL (ref 9.2–12.9)
POTASSIUM SERPL-SCNC: 6.3 MMOL/L (ref 3.5–5.1)
PROT SERPL-MCNC: 6.7 G/DL (ref 6–8.4)
RBC # BLD AUTO: 3.93 M/UL (ref 4–5.4)
SODIUM SERPL-SCNC: 135 MMOL/L (ref 136–145)
URATE SERPL-MCNC: 3.8 MG/DL (ref 2.4–5.7)
WBC # BLD AUTO: 4.93 K/UL (ref 3.9–12.7)

## 2021-10-19 PROCEDURE — 80053 COMPREHEN METABOLIC PANEL: CPT | Mod: PN | Performed by: INTERNAL MEDICINE

## 2021-10-19 PROCEDURE — 84550 ASSAY OF BLOOD/URIC ACID: CPT | Mod: PN | Performed by: INTERNAL MEDICINE

## 2021-10-19 PROCEDURE — 85025 COMPLETE CBC W/AUTO DIFF WBC: CPT | Mod: PN | Performed by: INTERNAL MEDICINE

## 2021-10-19 PROCEDURE — 83615 LACTATE (LD) (LDH) ENZYME: CPT | Mod: PN | Performed by: INTERNAL MEDICINE

## 2021-10-19 PROCEDURE — 36415 COLL VENOUS BLD VENIPUNCTURE: CPT | Mod: PN | Performed by: INTERNAL MEDICINE

## 2021-12-02 ENCOUNTER — TELEPHONE (OUTPATIENT)
Dept: HEMATOLOGY/ONCOLOGY | Facility: CLINIC | Age: 71
End: 2021-12-02
Payer: MEDICARE

## 2021-12-08 ENCOUNTER — TELEPHONE (OUTPATIENT)
Dept: CARDIOLOGY | Facility: HOSPITAL | Age: 71
End: 2021-12-08
Payer: MEDICARE

## 2021-12-09 ENCOUNTER — TELEPHONE (OUTPATIENT)
Dept: CARDIOLOGY | Facility: HOSPITAL | Age: 71
End: 2021-12-09
Payer: MEDICARE

## 2021-12-09 DIAGNOSIS — Z95.818 STATUS POST PLACEMENT OF IMPLANTABLE LOOP RECORDER: Primary | ICD-10-CM

## 2021-12-13 ENCOUNTER — OFFICE VISIT (OUTPATIENT)
Dept: CARDIOLOGY | Facility: CLINIC | Age: 71
End: 2021-12-13
Payer: MEDICARE

## 2021-12-13 ENCOUNTER — CLINICAL SUPPORT (OUTPATIENT)
Dept: CARDIOLOGY | Facility: HOSPITAL | Age: 71
End: 2021-12-13
Attending: INTERNAL MEDICINE
Payer: MEDICARE

## 2021-12-13 VITALS
HEIGHT: 63 IN | DIASTOLIC BLOOD PRESSURE: 91 MMHG | HEART RATE: 55 BPM | SYSTOLIC BLOOD PRESSURE: 158 MMHG | WEIGHT: 122 LBS | BODY MASS INDEX: 21.62 KG/M2

## 2021-12-13 DIAGNOSIS — I10 ESSENTIAL HYPERTENSION: ICD-10-CM

## 2021-12-13 DIAGNOSIS — I47.19 ATRIAL TACHYCARDIA: Primary | ICD-10-CM

## 2021-12-13 DIAGNOSIS — Z95.818 STATUS POST PLACEMENT OF IMPLANTABLE LOOP RECORDER: ICD-10-CM

## 2021-12-13 PROBLEM — I48.0 PAROXYSMAL ATRIAL FIBRILLATION: Status: ACTIVE | Noted: 2021-12-13

## 2021-12-13 PROCEDURE — 93010 ELECTROCARDIOGRAM REPORT: CPT | Mod: ,,, | Performed by: INTERNAL MEDICINE

## 2021-12-13 PROCEDURE — 99999 PR PBB SHADOW E&M-EST. PATIENT-LVL III: ICD-10-PCS | Mod: PBBFAC,,, | Performed by: INTERNAL MEDICINE

## 2021-12-13 PROCEDURE — 99999 PR PBB SHADOW E&M-EST. PATIENT-LVL III: CPT | Mod: PBBFAC,,, | Performed by: INTERNAL MEDICINE

## 2021-12-13 PROCEDURE — 93005 ELECTROCARDIOGRAM TRACING: CPT | Mod: PO

## 2021-12-13 PROCEDURE — 99204 OFFICE O/P NEW MOD 45 MIN: CPT | Mod: S$PBB,,, | Performed by: INTERNAL MEDICINE

## 2021-12-13 PROCEDURE — 99213 OFFICE O/P EST LOW 20 MIN: CPT | Mod: PBBFAC,PO | Performed by: INTERNAL MEDICINE

## 2021-12-13 PROCEDURE — 99204 PR OFFICE/OUTPT VISIT, NEW, LEVL IV, 45-59 MIN: ICD-10-PCS | Mod: S$PBB,,, | Performed by: INTERNAL MEDICINE

## 2021-12-13 PROCEDURE — 93010 RHYTHM STRIP: ICD-10-PCS | Mod: ,,, | Performed by: INTERNAL MEDICINE

## 2021-12-13 RX ORDER — ROSUVASTATIN CALCIUM 20 MG/1
20 TABLET, COATED ORAL DAILY
COMMUNITY
End: 2022-12-19 | Stop reason: ALTCHOICE

## 2021-12-14 DIAGNOSIS — I47.19 ATRIAL TACHYCARDIA: Primary | ICD-10-CM

## 2021-12-15 DIAGNOSIS — I47.10 SVT (SUPRAVENTRICULAR TACHYCARDIA): Primary | ICD-10-CM

## 2021-12-15 DIAGNOSIS — I47.19 ATRIAL TACHYCARDIA: ICD-10-CM

## 2021-12-15 DIAGNOSIS — I49.9 CARDIAC ARRHYTHMIA, UNSPECIFIED CARDIAC ARRHYTHMIA TYPE: ICD-10-CM

## 2021-12-16 ENCOUNTER — PATIENT MESSAGE (OUTPATIENT)
Dept: MEDSURG UNIT | Facility: HOSPITAL | Age: 71
End: 2021-12-16
Payer: MEDICARE

## 2022-01-03 ENCOUNTER — OFFICE VISIT (OUTPATIENT)
Dept: HEMATOLOGY/ONCOLOGY | Facility: CLINIC | Age: 72
End: 2022-01-03
Payer: MEDICARE

## 2022-01-03 ENCOUNTER — LAB VISIT (OUTPATIENT)
Dept: LAB | Facility: HOSPITAL | Age: 72
End: 2022-01-03
Attending: INTERNAL MEDICINE
Payer: MEDICARE

## 2022-01-03 VITALS
SYSTOLIC BLOOD PRESSURE: 130 MMHG | HEART RATE: 53 BPM | HEIGHT: 63 IN | WEIGHT: 123 LBS | TEMPERATURE: 96 F | BODY MASS INDEX: 21.79 KG/M2 | DIASTOLIC BLOOD PRESSURE: 82 MMHG

## 2022-01-03 DIAGNOSIS — I10 ESSENTIAL HYPERTENSION: ICD-10-CM

## 2022-01-03 DIAGNOSIS — R06.09 PLATYPNEA-ORTHODEOXIA SYNDROME: ICD-10-CM

## 2022-01-03 DIAGNOSIS — D75.89 MACROCYTOSIS: Primary | ICD-10-CM

## 2022-01-03 DIAGNOSIS — E78.5 HYPERLIPIDEMIA, UNSPECIFIED HYPERLIPIDEMIA TYPE: ICD-10-CM

## 2022-01-03 DIAGNOSIS — I47.19 ATRIAL TACHYCARDIA: ICD-10-CM

## 2022-01-03 DIAGNOSIS — D75.89 MACROCYTOSIS: ICD-10-CM

## 2022-01-03 LAB
ALBUMIN SERPL BCP-MCNC: 3.8 G/DL (ref 3.5–5.2)
ALP SERPL-CCNC: 58 U/L (ref 55–135)
ALT SERPL W/O P-5'-P-CCNC: 13 U/L (ref 10–44)
ANION GAP SERPL CALC-SCNC: 6 MMOL/L (ref 8–16)
AST SERPL-CCNC: 16 U/L (ref 10–40)
BASOPHILS # BLD AUTO: 0.05 K/UL (ref 0–0.2)
BASOPHILS # BLD AUTO: 0.05 K/UL (ref 0–0.2)
BASOPHILS NFR BLD: 1.2 % (ref 0–1.9)
BASOPHILS NFR BLD: 1.2 % (ref 0–1.9)
BILIRUB SERPL-MCNC: 0.5 MG/DL (ref 0.1–1)
BUN SERPL-MCNC: 12 MG/DL (ref 8–23)
CALCIUM SERPL-MCNC: 9.5 MG/DL (ref 8.7–10.5)
CHLORIDE SERPL-SCNC: 103 MMOL/L (ref 95–110)
CO2 SERPL-SCNC: 29 MMOL/L (ref 23–29)
CREAT SERPL-MCNC: 0.8 MG/DL (ref 0.5–1.4)
DIFFERENTIAL METHOD: ABNORMAL
DIFFERENTIAL METHOD: ABNORMAL
EOSINOPHIL # BLD AUTO: 0.2 K/UL (ref 0–0.5)
EOSINOPHIL # BLD AUTO: 0.2 K/UL (ref 0–0.5)
EOSINOPHIL NFR BLD: 5.1 % (ref 0–8)
EOSINOPHIL NFR BLD: 5.4 % (ref 0–8)
ERYTHROCYTE [DISTWIDTH] IN BLOOD BY AUTOMATED COUNT: 13 % (ref 11.5–14.5)
ERYTHROCYTE [DISTWIDTH] IN BLOOD BY AUTOMATED COUNT: 13.1 % (ref 11.5–14.5)
EST. GFR  (AFRICAN AMERICAN): >60 ML/MIN/1.73 M^2
EST. GFR  (NON AFRICAN AMERICAN): >60 ML/MIN/1.73 M^2
GLUCOSE SERPL-MCNC: 85 MG/DL (ref 70–110)
HCT VFR BLD AUTO: 37.5 % (ref 37–48.5)
HCT VFR BLD AUTO: 37.8 % (ref 37–48.5)
HGB BLD-MCNC: 12.6 G/DL (ref 12–16)
HGB BLD-MCNC: 12.7 G/DL (ref 12–16)
IMM GRANULOCYTES # BLD AUTO: 0 K/UL (ref 0–0.04)
IMM GRANULOCYTES # BLD AUTO: 0.01 K/UL (ref 0–0.04)
IMM GRANULOCYTES NFR BLD AUTO: 0 % (ref 0–0.5)
IMM GRANULOCYTES NFR BLD AUTO: 0.2 % (ref 0–0.5)
LDH SERPL L TO P-CCNC: 277 U/L (ref 110–260)
LYMPHOCYTES # BLD AUTO: 1.2 K/UL (ref 1–4.8)
LYMPHOCYTES # BLD AUTO: 1.3 K/UL (ref 1–4.8)
LYMPHOCYTES NFR BLD: 28.3 % (ref 18–48)
LYMPHOCYTES NFR BLD: 28.8 % (ref 18–48)
MCH RBC QN AUTO: 33.6 PG (ref 27–31)
MCH RBC QN AUTO: 33.7 PG (ref 27–31)
MCHC RBC AUTO-ENTMCNC: 33.6 G/DL (ref 32–36)
MCHC RBC AUTO-ENTMCNC: 33.6 G/DL (ref 32–36)
MCV RBC AUTO: 100 FL (ref 82–98)
MCV RBC AUTO: 100 FL (ref 82–98)
MONOCYTES # BLD AUTO: 0.4 K/UL (ref 0.3–1)
MONOCYTES # BLD AUTO: 0.4 K/UL (ref 0.3–1)
MONOCYTES NFR BLD: 10.1 % (ref 4–15)
MONOCYTES NFR BLD: 10.1 % (ref 4–15)
NEUTROPHILS # BLD AUTO: 2.3 K/UL (ref 1.8–7.7)
NEUTROPHILS # BLD AUTO: 2.4 K/UL (ref 1.8–7.7)
NEUTROPHILS NFR BLD: 54.6 % (ref 38–73)
NEUTROPHILS NFR BLD: 55 % (ref 38–73)
NRBC BLD-RTO: 0 /100 WBC
NRBC BLD-RTO: 0 /100 WBC
PLATELET # BLD AUTO: 297 K/UL (ref 150–450)
PLATELET # BLD AUTO: 297 K/UL (ref 150–450)
PMV BLD AUTO: 9.5 FL (ref 9.2–12.9)
PMV BLD AUTO: 9.6 FL (ref 9.2–12.9)
POTASSIUM SERPL-SCNC: 5 MMOL/L (ref 3.5–5.1)
PROT SERPL-MCNC: 6.6 G/DL (ref 6–8.4)
RBC # BLD AUTO: 3.74 M/UL (ref 4–5.4)
RBC # BLD AUTO: 3.78 M/UL (ref 4–5.4)
SODIUM SERPL-SCNC: 138 MMOL/L (ref 136–145)
URATE SERPL-MCNC: 3.8 MG/DL (ref 2.4–5.7)
WBC # BLD AUTO: 4.24 K/UL (ref 3.9–12.7)
WBC # BLD AUTO: 4.34 K/UL (ref 3.9–12.7)

## 2022-01-03 PROCEDURE — 99999 PR PBB SHADOW E&M-EST. PATIENT-LVL III: ICD-10-PCS | Mod: PBBFAC,,, | Performed by: INTERNAL MEDICINE

## 2022-01-03 PROCEDURE — 83615 LACTATE (LD) (LDH) ENZYME: CPT | Mod: PN | Performed by: INTERNAL MEDICINE

## 2022-01-03 PROCEDURE — 85025 COMPLETE CBC W/AUTO DIFF WBC: CPT | Mod: PN | Performed by: INTERNAL MEDICINE

## 2022-01-03 PROCEDURE — 80053 COMPREHEN METABOLIC PANEL: CPT | Mod: PN | Performed by: INTERNAL MEDICINE

## 2022-01-03 PROCEDURE — 99213 PR OFFICE/OUTPT VISIT, EST, LEVL III, 20-29 MIN: ICD-10-PCS | Mod: S$PBB,,, | Performed by: INTERNAL MEDICINE

## 2022-01-03 PROCEDURE — 99213 OFFICE O/P EST LOW 20 MIN: CPT | Mod: S$PBB,,, | Performed by: INTERNAL MEDICINE

## 2022-01-03 PROCEDURE — 99999 PR PBB SHADOW E&M-EST. PATIENT-LVL III: CPT | Mod: PBBFAC,,, | Performed by: INTERNAL MEDICINE

## 2022-01-03 PROCEDURE — 84550 ASSAY OF BLOOD/URIC ACID: CPT | Mod: PN | Performed by: INTERNAL MEDICINE

## 2022-01-03 PROCEDURE — 99213 OFFICE O/P EST LOW 20 MIN: CPT | Mod: PBBFAC,PN | Performed by: INTERNAL MEDICINE

## 2022-01-03 PROCEDURE — 85025 COMPLETE CBC W/AUTO DIFF WBC: CPT | Mod: 91,PN | Performed by: NURSE PRACTITIONER

## 2022-01-03 RX ORDER — TRAMADOL HYDROCHLORIDE 50 MG/1
50 TABLET ORAL 3 TIMES DAILY PRN
COMMUNITY
Start: 2021-12-16 | End: 2022-06-30

## 2022-01-03 RX ORDER — GABAPENTIN 300 MG/1
300 CAPSULE ORAL NIGHTLY
COMMUNITY
Start: 2021-12-15 | End: 2022-06-30

## 2022-01-03 NOTE — Clinical Note
The patient will need labs in 6 months with CBC, CMP, LDH and Uric acid.  She will need an appt with me at that time.

## 2022-01-12 ENCOUNTER — OFFICE VISIT (OUTPATIENT)
Dept: PODIATRY | Facility: CLINIC | Age: 72
End: 2022-01-12
Payer: MEDICARE

## 2022-01-12 VITALS — BODY MASS INDEX: 21.79 KG/M2 | HEIGHT: 63 IN | WEIGHT: 123 LBS

## 2022-01-12 DIAGNOSIS — M20.5X2 CROSSOVER TOE DEFORMITY OF LEFT FOOT: Primary | ICD-10-CM

## 2022-01-12 DIAGNOSIS — M20.12 HAV (HALLUX ABDUCTO VALGUS), LEFT: ICD-10-CM

## 2022-01-12 DIAGNOSIS — D36.10 NEUROMA: ICD-10-CM

## 2022-01-12 DIAGNOSIS — M77.42 METATARSALGIA, LEFT FOOT: ICD-10-CM

## 2022-01-12 PROCEDURE — 99999 PR PBB SHADOW E&M-EST. PATIENT-LVL III: CPT | Mod: PBBFAC,,, | Performed by: STUDENT IN AN ORGANIZED HEALTH CARE EDUCATION/TRAINING PROGRAM

## 2022-01-12 PROCEDURE — 99203 OFFICE O/P NEW LOW 30 MIN: CPT | Mod: S$PBB,,, | Performed by: STUDENT IN AN ORGANIZED HEALTH CARE EDUCATION/TRAINING PROGRAM

## 2022-01-12 PROCEDURE — 99213 OFFICE O/P EST LOW 20 MIN: CPT | Mod: PBBFAC,PN | Performed by: STUDENT IN AN ORGANIZED HEALTH CARE EDUCATION/TRAINING PROGRAM

## 2022-01-12 PROCEDURE — 99203 PR OFFICE/OUTPT VISIT, NEW, LEVL III, 30-44 MIN: ICD-10-PCS | Mod: S$PBB,,, | Performed by: STUDENT IN AN ORGANIZED HEALTH CARE EDUCATION/TRAINING PROGRAM

## 2022-01-12 PROCEDURE — 99999 PR PBB SHADOW E&M-EST. PATIENT-LVL III: ICD-10-PCS | Mod: PBBFAC,,, | Performed by: STUDENT IN AN ORGANIZED HEALTH CARE EDUCATION/TRAINING PROGRAM

## 2022-01-12 NOTE — PROGRESS NOTES
Subjective:      Patient ID: Gricelda Hassan is a 71 y.o. female.    Chief Complaint: Foot Pain (Left foot pain)    Patient presents to clinic with about 2 week history of pain, swelling and redness to the left 2nd toe. The pain and swelling have greatly improved over the last 2 weeks. She walks 3ish miles per day and has some discomfort. She's tried metatarsal pads and has them in her shoes right now.     Review of Systems   Musculoskeletal: Positive for joint pain and joint swelling.   All other systems reviewed and are negative.          Objective:      Physical Exam  Cardiovascular:      Pulses:           Dorsalis pedis pulses are 2+ on the left side.        Posterior tibial pulses are 2+ on the left side.   Musculoskeletal:      Comments: Left foot:  Moderate HAV deformity of the left foot with limited ROM. < 25 degrees. No pain or crepitation with 1st MTPJ ROM. There is minor dorsal subluxation     Pain with Lachman's test of the 2nd toe.  There is tenderness to palpation of the 2nd MTPJ addition to pain palpation of the 2nd interspace that is worse with metatarsal compression.               Assessment:       Encounter Diagnoses   Name Primary?    Crossover toe deformity of left foot Yes    Hav (hallux abducto valgus), left     Metatarsalgia, left foot     Neuroma          Plan:       Gricelda was seen today for foot pain.    Diagnoses and all orders for this visit:    Crossover toe deformity of left foot    Hav (hallux abducto valgus), left    Metatarsalgia, left foot    Neuroma      I counseled the patient on her conditions, their implications and medical management.    Recommend doing wearing stiffer shoes with a wider toe box to accommodate for and offload the deformities.  I recommend using topical Voltaren gel to help decrease inflammation in the area.  I recommend using a frozen water bottle to roll on the bottom of the foot to also help with pain and inflammation.  I showed the patient proper  placement of a metatarsal pad in addition to proper taping of her 2nd toe to reduce dorsal subluxation.    Patient to follow-up in 6-8 weeks.    Jose Bergeron DPM

## 2022-01-17 ENCOUNTER — PATIENT MESSAGE (OUTPATIENT)
Dept: MEDSURG UNIT | Facility: HOSPITAL | Age: 72
End: 2022-01-17
Payer: MEDICARE

## 2022-01-20 ENCOUNTER — PATIENT MESSAGE (OUTPATIENT)
Dept: ELECTROPHYSIOLOGY | Facility: CLINIC | Age: 72
End: 2022-01-20
Payer: MEDICARE

## 2022-01-31 ENCOUNTER — PATIENT MESSAGE (OUTPATIENT)
Dept: ELECTROPHYSIOLOGY | Facility: CLINIC | Age: 72
End: 2022-01-31
Payer: MEDICARE

## 2022-02-01 ENCOUNTER — PATIENT MESSAGE (OUTPATIENT)
Dept: HEMATOLOGY/ONCOLOGY | Facility: CLINIC | Age: 72
End: 2022-02-01
Payer: MEDICARE

## 2022-02-01 ENCOUNTER — LAB VISIT (OUTPATIENT)
Dept: LAB | Facility: HOSPITAL | Age: 72
End: 2022-02-01
Attending: INTERNAL MEDICINE
Payer: MEDICARE

## 2022-02-01 DIAGNOSIS — I47.10 SVT (SUPRAVENTRICULAR TACHYCARDIA): ICD-10-CM

## 2022-02-01 DIAGNOSIS — I47.19 ATRIAL TACHYCARDIA: ICD-10-CM

## 2022-02-01 DIAGNOSIS — I49.9 CARDIAC ARRHYTHMIA, UNSPECIFIED CARDIAC ARRHYTHMIA TYPE: ICD-10-CM

## 2022-02-01 LAB
ANION GAP SERPL CALC-SCNC: 7 MMOL/L (ref 8–16)
APTT BLDCRRT: 25.6 SEC (ref 21–32)
BASOPHILS # BLD AUTO: 0.04 K/UL (ref 0–0.2)
BASOPHILS NFR BLD: 0.7 % (ref 0–1.9)
BUN SERPL-MCNC: 13 MG/DL (ref 8–23)
CALCIUM SERPL-MCNC: 9.8 MG/DL (ref 8.7–10.5)
CHLORIDE SERPL-SCNC: 101 MMOL/L (ref 95–110)
CO2 SERPL-SCNC: 28 MMOL/L (ref 23–29)
CREAT SERPL-MCNC: 0.8 MG/DL (ref 0.5–1.4)
DIFFERENTIAL METHOD: ABNORMAL
EOSINOPHIL # BLD AUTO: 0.3 K/UL (ref 0–0.5)
EOSINOPHIL NFR BLD: 5.5 % (ref 0–8)
ERYTHROCYTE [DISTWIDTH] IN BLOOD BY AUTOMATED COUNT: 12.9 % (ref 11.5–14.5)
EST. GFR  (AFRICAN AMERICAN): >60 ML/MIN/1.73 M^2
EST. GFR  (NON AFRICAN AMERICAN): >60 ML/MIN/1.73 M^2
GLUCOSE SERPL-MCNC: 95 MG/DL (ref 70–110)
HCT VFR BLD AUTO: 36.7 % (ref 37–48.5)
HGB BLD-MCNC: 12.3 G/DL (ref 12–16)
IMM GRANULOCYTES # BLD AUTO: 0.01 K/UL (ref 0–0.04)
IMM GRANULOCYTES NFR BLD AUTO: 0.2 % (ref 0–0.5)
INR PPP: 0.9 (ref 0.8–1.2)
LYMPHOCYTES # BLD AUTO: 1.4 K/UL (ref 1–4.8)
LYMPHOCYTES NFR BLD: 24.4 % (ref 18–48)
MCH RBC QN AUTO: 33.4 PG (ref 27–31)
MCHC RBC AUTO-ENTMCNC: 33.5 G/DL (ref 32–36)
MCV RBC AUTO: 100 FL (ref 82–98)
MONOCYTES # BLD AUTO: 0.5 K/UL (ref 0.3–1)
MONOCYTES NFR BLD: 8 % (ref 4–15)
NEUTROPHILS # BLD AUTO: 3.5 K/UL (ref 1.8–7.7)
NEUTROPHILS NFR BLD: 61.2 % (ref 38–73)
NRBC BLD-RTO: 0 /100 WBC
PLATELET # BLD AUTO: 282 K/UL (ref 150–450)
PMV BLD AUTO: 9.6 FL (ref 9.2–12.9)
POTASSIUM SERPL-SCNC: 5.2 MMOL/L (ref 3.5–5.1)
PROTHROMBIN TIME: 10.4 SEC (ref 9–12.5)
RBC # BLD AUTO: 3.68 M/UL (ref 4–5.4)
SODIUM SERPL-SCNC: 136 MMOL/L (ref 136–145)
WBC # BLD AUTO: 5.77 K/UL (ref 3.9–12.7)

## 2022-02-01 PROCEDURE — 85610 PROTHROMBIN TIME: CPT | Performed by: INTERNAL MEDICINE

## 2022-02-01 PROCEDURE — 85730 THROMBOPLASTIN TIME PARTIAL: CPT | Performed by: INTERNAL MEDICINE

## 2022-02-01 PROCEDURE — 80048 BASIC METABOLIC PNL TOTAL CA: CPT | Mod: PN | Performed by: INTERNAL MEDICINE

## 2022-02-01 PROCEDURE — 85025 COMPLETE CBC W/AUTO DIFF WBC: CPT | Mod: PN | Performed by: INTERNAL MEDICINE

## 2022-02-01 PROCEDURE — 36415 COLL VENOUS BLD VENIPUNCTURE: CPT | Mod: PN | Performed by: INTERNAL MEDICINE

## 2022-02-04 ENCOUNTER — LAB VISIT (OUTPATIENT)
Dept: LAB | Facility: HOSPITAL | Age: 72
End: 2022-02-04
Attending: NURSE PRACTITIONER
Payer: MEDICARE

## 2022-02-04 ENCOUNTER — TELEPHONE (OUTPATIENT)
Dept: ELECTROPHYSIOLOGY | Facility: CLINIC | Age: 72
End: 2022-02-04
Payer: MEDICARE

## 2022-02-04 DIAGNOSIS — R53.81 DEBILITY: Primary | ICD-10-CM

## 2022-02-04 DIAGNOSIS — E78.5 HYPERLIPEMIA: ICD-10-CM

## 2022-02-04 LAB
CHOLEST SERPL-MCNC: 181 MG/DL (ref 120–199)
CHOLEST/HDLC SERPL: 2.4 {RATIO} (ref 2–5)
ESTIMATED AVG GLUCOSE: 94 MG/DL (ref 68–131)
HBA1C MFR BLD: 4.9 % (ref 4–5.6)
HDLC SERPL-MCNC: 76 MG/DL (ref 40–75)
HDLC SERPL: 42 % (ref 20–50)
LDLC SERPL CALC-MCNC: 90.4 MG/DL (ref 63–159)
NONHDLC SERPL-MCNC: 105 MG/DL
TRIGL SERPL-MCNC: 73 MG/DL (ref 30–150)

## 2022-02-04 PROCEDURE — 36415 COLL VENOUS BLD VENIPUNCTURE: CPT | Mod: PN | Performed by: NURSE PRACTITIONER

## 2022-02-04 PROCEDURE — 80061 LIPID PANEL: CPT | Performed by: NURSE PRACTITIONER

## 2022-02-04 PROCEDURE — 83036 HEMOGLOBIN GLYCOSYLATED A1C: CPT | Performed by: NURSE PRACTITIONER

## 2022-02-04 NOTE — TELEPHONE ENCOUNTER
Spoke to patient    CONFIRMED procedure arrival time of 5:15am on 2/7/22 for SVT RFA with Dr Bishop    Reiterated instructions including:  -Directions to check in desk  -NPO after midnight night prior to procedure  -High importance of HOLDING Propafenone for 3 days prior to procedure. Confirmed that last dose was 2/3/2022  -Pre-procedure LABS reviewed. K+=5.2. Patient states she sees hematology and they put her on a low K+ diet. K+ has been as high as 5.4. She is also on Quinapril. Advised to discuss this with her PCP and they may want to try a different class of drugs since ACE-Inhibitors can cause K+ retention.   -COVID test to be done on admit  -Confirmed no fever, cough, or shortness of breath in the past 30 days  -Confirmed no redness, rash, irritation, or yeast infection to groin area.   -Do not wear mascara day of procedure  -Reviewed current visitor policy    Patient verbalizes understanding of above and appreciates call.

## 2022-02-07 ENCOUNTER — ANESTHESIA EVENT (OUTPATIENT)
Dept: MEDSURG UNIT | Facility: HOSPITAL | Age: 72
End: 2022-02-07
Payer: MEDICARE

## 2022-02-07 ENCOUNTER — HOSPITAL ENCOUNTER (OUTPATIENT)
Facility: HOSPITAL | Age: 72
Discharge: HOME | End: 2022-02-07
Attending: INTERNAL MEDICINE | Admitting: INTERNAL MEDICINE
Payer: MEDICARE

## 2022-02-07 ENCOUNTER — ANESTHESIA (OUTPATIENT)
Dept: MEDSURG UNIT | Facility: HOSPITAL | Age: 72
End: 2022-02-07
Payer: MEDICARE

## 2022-02-07 VITALS
BODY MASS INDEX: 21.62 KG/M2 | TEMPERATURE: 98 F | HEART RATE: 58 BPM | HEIGHT: 63 IN | SYSTOLIC BLOOD PRESSURE: 128 MMHG | DIASTOLIC BLOOD PRESSURE: 73 MMHG | RESPIRATION RATE: 20 BRPM | WEIGHT: 122 LBS | OXYGEN SATURATION: 98 %

## 2022-02-07 DIAGNOSIS — I47.19 ATRIAL TACHYCARDIA: ICD-10-CM

## 2022-02-07 DIAGNOSIS — I47.10 SVT (SUPRAVENTRICULAR TACHYCARDIA): ICD-10-CM

## 2022-02-07 DIAGNOSIS — I49.9 ARRHYTHMIA: ICD-10-CM

## 2022-02-07 LAB
ANION GAP SERPL CALC-SCNC: 6 MMOL/L (ref 8–16)
BUN SERPL-MCNC: 12 MG/DL (ref 8–23)
CALCIUM SERPL-MCNC: 9.1 MG/DL (ref 8.7–10.5)
CHLORIDE SERPL-SCNC: 105 MMOL/L (ref 95–110)
CO2 SERPL-SCNC: 28 MMOL/L (ref 23–29)
CREAT SERPL-MCNC: 0.7 MG/DL (ref 0.5–1.4)
CTP QC/QA: YES
EST. GFR  (AFRICAN AMERICAN): >60 ML/MIN/1.73 M^2
EST. GFR  (NON AFRICAN AMERICAN): >60 ML/MIN/1.73 M^2
GLUCOSE SERPL-MCNC: 82 MG/DL (ref 70–110)
POTASSIUM SERPL-SCNC: 4.4 MMOL/L (ref 3.5–5.1)
SARS-COV-2 AG RESP QL IA.RAPID: NEGATIVE
SODIUM SERPL-SCNC: 139 MMOL/L (ref 136–145)

## 2022-02-07 PROCEDURE — 37000009 HC ANESTHESIA EA ADD 15 MINS: Performed by: INTERNAL MEDICINE

## 2022-02-07 PROCEDURE — 93005 ELECTROCARDIOGRAM TRACING: CPT | Mod: 59

## 2022-02-07 PROCEDURE — 93653 COMPRE EP EVAL TX SVT: CPT | Mod: GC | Performed by: INTERNAL MEDICINE

## 2022-02-07 PROCEDURE — 80048 BASIC METABOLIC PNL TOTAL CA: CPT | Performed by: NURSE PRACTITIONER

## 2022-02-07 PROCEDURE — C1733 CATH, EP, OTHR THAN COOL-TIP: HCPCS | Performed by: INTERNAL MEDICINE

## 2022-02-07 PROCEDURE — D9220A PRA ANESTHESIA: Mod: ANES,,, | Performed by: ANESTHESIOLOGY

## 2022-02-07 PROCEDURE — 93653 PR ELECTROPHYS EVAL, COMPREHEN, W/SUPRAVENT TACHYCARD TRMT: ICD-10-PCS | Mod: GC,,, | Performed by: INTERNAL MEDICINE

## 2022-02-07 PROCEDURE — 25000003 PHARM REV CODE 250: Performed by: NURSE ANESTHETIST, CERTIFIED REGISTERED

## 2022-02-07 PROCEDURE — 37000008 HC ANESTHESIA 1ST 15 MINUTES: Performed by: INTERNAL MEDICINE

## 2022-02-07 PROCEDURE — 25000003 PHARM REV CODE 250: Performed by: INTERNAL MEDICINE

## 2022-02-07 PROCEDURE — C1894 INTRO/SHEATH, NON-LASER: HCPCS | Performed by: INTERNAL MEDICINE

## 2022-02-07 PROCEDURE — 93623 PR STIM/PACING HEART POST IV DRUG INFU: ICD-10-PCS | Mod: 26,GC,, | Performed by: INTERNAL MEDICINE

## 2022-02-07 PROCEDURE — C1730 CATH, EP, 19 OR FEW ELECT: HCPCS | Performed by: INTERNAL MEDICINE

## 2022-02-07 PROCEDURE — D9220A PRA ANESTHESIA: Mod: CRNA,,, | Performed by: NURSE ANESTHETIST, CERTIFIED REGISTERED

## 2022-02-07 PROCEDURE — D9220A PRA ANESTHESIA: ICD-10-PCS | Mod: ANES,,, | Performed by: ANESTHESIOLOGY

## 2022-02-07 PROCEDURE — 63600175 PHARM REV CODE 636 W HCPCS: Performed by: NURSE ANESTHETIST, CERTIFIED REGISTERED

## 2022-02-07 PROCEDURE — 93623 PRGRMD STIMJ&PACG IV RX NFS: CPT | Mod: GC | Performed by: INTERNAL MEDICINE

## 2022-02-07 PROCEDURE — D9220A PRA ANESTHESIA: ICD-10-PCS | Mod: CRNA,,, | Performed by: NURSE ANESTHETIST, CERTIFIED REGISTERED

## 2022-02-07 PROCEDURE — 27201423 OPTIME MED/SURG SUP & DEVICES STERILE SUPPLY: Performed by: INTERNAL MEDICINE

## 2022-02-07 PROCEDURE — 93010 EKG 12-LEAD: ICD-10-PCS | Mod: ,,, | Performed by: INTERNAL MEDICINE

## 2022-02-07 PROCEDURE — 93010 ELECTROCARDIOGRAM REPORT: CPT | Mod: ,,, | Performed by: INTERNAL MEDICINE

## 2022-02-07 PROCEDURE — 93653 COMPRE EP EVAL TX SVT: CPT | Mod: GC,,, | Performed by: INTERNAL MEDICINE

## 2022-02-07 PROCEDURE — 93623 PRGRMD STIMJ&PACG IV RX NFS: CPT | Mod: 26,GC,, | Performed by: INTERNAL MEDICINE

## 2022-02-07 RX ORDER — PROPOFOL 10 MG/ML
VIAL (ML) INTRAVENOUS
Status: DISCONTINUED | OUTPATIENT
Start: 2022-02-07 | End: 2022-02-07

## 2022-02-07 RX ORDER — SODIUM CHLORIDE 0.9 % (FLUSH) 0.9 %
3 SYRINGE (ML) INJECTION
Status: DISCONTINUED | OUTPATIENT
Start: 2022-02-07 | End: 2022-02-07 | Stop reason: HOSPADM

## 2022-02-07 RX ORDER — PROCHLORPERAZINE EDISYLATE 5 MG/ML
5 INJECTION INTRAMUSCULAR; INTRAVENOUS EVERY 30 MIN PRN
Status: DISCONTINUED | OUTPATIENT
Start: 2022-02-07 | End: 2022-02-07 | Stop reason: HOSPADM

## 2022-02-07 RX ORDER — LIDOCAINE HYDROCHLORIDE 20 MG/ML
INJECTION, SOLUTION INFILTRATION; PERINEURAL
Status: DISCONTINUED | OUTPATIENT
Start: 2022-02-07 | End: 2022-02-07 | Stop reason: HOSPADM

## 2022-02-07 RX ORDER — LIDOCAINE HYDROCHLORIDE 20 MG/ML
INJECTION INTRAVENOUS
Status: DISCONTINUED | OUTPATIENT
Start: 2022-02-07 | End: 2022-02-07

## 2022-02-07 RX ORDER — ACETAMINOPHEN 325 MG/1
650 TABLET ORAL EVERY 4 HOURS PRN
Status: DISCONTINUED | OUTPATIENT
Start: 2022-02-07 | End: 2022-02-07 | Stop reason: HOSPADM

## 2022-02-07 RX ORDER — HALOPERIDOL 5 MG/ML
0.5 INJECTION INTRAMUSCULAR EVERY 10 MIN PRN
Status: DISCONTINUED | OUTPATIENT
Start: 2022-02-07 | End: 2022-02-07 | Stop reason: HOSPADM

## 2022-02-07 RX ORDER — HYDROMORPHONE HYDROCHLORIDE 1 MG/ML
0.2 INJECTION, SOLUTION INTRAMUSCULAR; INTRAVENOUS; SUBCUTANEOUS EVERY 5 MIN PRN
Status: DISCONTINUED | OUTPATIENT
Start: 2022-02-07 | End: 2022-02-07 | Stop reason: HOSPADM

## 2022-02-07 RX ORDER — MIDAZOLAM HYDROCHLORIDE 1 MG/ML
INJECTION, SOLUTION INTRAMUSCULAR; INTRAVENOUS
Status: DISCONTINUED | OUTPATIENT
Start: 2022-02-07 | End: 2022-02-07

## 2022-02-07 RX ORDER — PROPOFOL 10 MG/ML
VIAL (ML) INTRAVENOUS CONTINUOUS PRN
Status: DISCONTINUED | OUTPATIENT
Start: 2022-02-07 | End: 2022-02-07

## 2022-02-07 RX ORDER — FENTANYL CITRATE 50 UG/ML
25 INJECTION, SOLUTION INTRAMUSCULAR; INTRAVENOUS EVERY 5 MIN PRN
Status: DISCONTINUED | OUTPATIENT
Start: 2022-02-07 | End: 2022-02-07 | Stop reason: HOSPADM

## 2022-02-07 RX ORDER — DIPHENHYDRAMINE HYDROCHLORIDE 50 MG/ML
25 INJECTION INTRAMUSCULAR; INTRAVENOUS EVERY 6 HOURS PRN
Status: DISCONTINUED | OUTPATIENT
Start: 2022-02-07 | End: 2022-02-07 | Stop reason: HOSPADM

## 2022-02-07 RX ORDER — GABAPENTIN 300 MG/1
300 CAPSULE ORAL 3 TIMES DAILY
Status: DISCONTINUED | OUTPATIENT
Start: 2022-02-07 | End: 2022-02-07 | Stop reason: HOSPADM

## 2022-02-07 RX ORDER — HEPARIN SOD,PORCINE/0.9 % NACL 1000/500ML
INTRAVENOUS SOLUTION INTRAVENOUS
Status: DISCONTINUED | OUTPATIENT
Start: 2022-02-07 | End: 2022-02-07 | Stop reason: HOSPADM

## 2022-02-07 RX ADMIN — PROPOFOL 50 MG: 10 INJECTION, EMULSION INTRAVENOUS at 07:02

## 2022-02-07 RX ADMIN — MIDAZOLAM HYDROCHLORIDE 2 MG: 1 INJECTION, SOLUTION INTRAMUSCULAR; INTRAVENOUS at 07:02

## 2022-02-07 RX ADMIN — ACETAMINOPHEN 650 MG: 325 TABLET ORAL at 10:02

## 2022-02-07 RX ADMIN — SODIUM CHLORIDE: 0.9 INJECTION, SOLUTION INTRAVENOUS at 07:02

## 2022-02-07 RX ADMIN — ISOPROTERENOL HYDROCHLORIDE 2 MCG/MIN: 0.2 INJECTION, SOLUTION INTRAMUSCULAR; INTRAVENOUS at 08:02

## 2022-02-07 RX ADMIN — GABAPENTIN 300 MG: 300 CAPSULE ORAL at 11:02

## 2022-02-07 RX ADMIN — LIDOCAINE HYDROCHLORIDE 80 MG: 20 INJECTION, SOLUTION INTRAVENOUS at 07:02

## 2022-02-07 RX ADMIN — Medication 150 MCG/KG/MIN: at 07:02

## 2022-02-07 NOTE — ANESTHESIA POSTPROCEDURE EVALUATION
Anesthesia Post Evaluation    Patient: Gricelda Hassan    Procedure(s) Performed: Procedure(s) (LRB):  Ablation, SVT, Accessory Pathway (N/A)    Final Anesthesia Type: general      Patient location during evaluation: PACU  Patient participation: Yes- Able to Participate  Level of consciousness: awake and alert  Post-procedure vital signs: reviewed and stable  Pain management: adequate  Airway patency: patent    PONV status at discharge: No PONV  Anesthetic complications: no      Cardiovascular status: blood pressure returned to baseline  Respiratory status: unassisted  Follow-up not needed.          Vitals Value Taken Time   /75 02/07/22 1216   Temp 36.8 °C (98.2 °F) 02/07/22 1100   Pulse 61 02/07/22 1226   Resp 20 02/07/22 1226   SpO2 99 % 02/07/22 1226         No case tracking events are documented in the log.      Pain/Tay Score: Pain Rating Prior to Med Admin: 6 (2/7/2022 10:20 AM)  Pain Rating Post Med Admin: 0 (2/7/2022  9:30 AM)  Tay Score: 10 (2/7/2022 11:00 AM)

## 2022-02-07 NOTE — DISCHARGE INSTRUCTIONS
1. Do not strain or lift anything greater than 5 lb for 1 week.   2. Do not drive or operate any dangerous machinery for 24 hours.   3. Keep the dressing on, clean, and dry for 24 hours.   4. After 24 hours, the dressing may be removed and a shower is allowed.   5. Clean the area with mild soap and water and then leave it opened to air.   6. Once the skin has healed, bathing in a tub or swimming is allowed.   7. Inspect the groin site daily and report to the physician any swelling at the site that   cannot be controlled with manual pressure for 10 minutes, unusual pain at the   access site or affected extremity, unusual swelling at the access site, or signs or   symptoms of infection such as redness, pain, or fever.   Call 911 if you have:   Bleeding from the puncture site that you cannot stop by doing the following:   Relax and lie down right away. Keep your leg flat and apply firm pressure to the site using your fingers and a gauze pad. Keep the pressure on for 20 minutes. Continue this until the bleeding stops. This may take awhile. When bleeding stops, cover the site with a sterile bandage and keep your leg still as much as possible.

## 2022-02-07 NOTE — HPI
Gricelda Hassan is a 71 y.o. female who presents for EPS and RFA of SVT.      HPI 70 yo female with atrial tachycardia, Htn.  Primary cardiologist is Dr. Gold. Previously seen by Dr. Carrasco.     Notes indicate paroxysmal AF, first diagnosed 7/4/19. She is not aware of this diagnosis. She reports atrial tachycardia.  She has noted palpitations since 2017. First episode occurred while in [place]. She was with a physician, and was told she needed to see an EP.   Wore a holter monitor, and was told she has atrial tachycardia. Offered RFA, she deferred.  Placed on Propafenone 150 mg BID. Switched to Propafenone  mg BID 3 years ago.  Still notes occasional palpitations. Acute onset and gradual offset, lasting up to 9 hours. She reports HR's are generally in the 130's. Occurring every couple of months.     ILR reveals no AF, episodes c/w sinus tachycardia.     On Propafenone   Echo 10/18/21 normal biventricular structure and function, normal left atrial size.  Spect stress 10/21/21 negative.    Today, she denies CP or SOB. Had an episode of tachycardia yesterday that lasted 1 hour. HR max 190 bpm. Off propafenone x 3 days prior to EPS/ablation.

## 2022-02-07 NOTE — HOSPITAL COURSE
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 Patient was non-inducible following RFA. No evidence of post-procedural complications including no hematoma or bleeding from groin access site. I discussed activity restrictions as well as medication changes (stopping propafenone and to continue ASA for at least 1 more month from ablation perspective). HD stable and sating well on RA at discharge.

## 2022-02-07 NOTE — H&P
[facility] - Short Stay Cardiac Unit  Cardiac Electrophysiology  History and Physical     Admission Date: 2/7/2022  Code Status: No Order   Attending Provider: Danilo Bishop MD   Principal Problem:<principal problem not specified>    Subjective:     Chief Complaint:  SVT     HPI:  Gricelda Hassan is a 71 y.o. female who presents for EPS and RFA of SVT.      HPI 70 yo female with atrial tachycardia, Htn.  Primary cardiologist is Dr. Gold. Previously seen by Dr. Carrasco.     Notes indicate paroxysmal AF, first diagnosed 7/4/19. She is not aware of this diagnosis. She reports atrial tachycardia.  She has noted palpitations since 2017. First episode occurred while in [place]. She was with a physician, and was told she needed to see an EP.   Wore a holter monitor, and was told she has atrial tachycardia. Offered RFA, she deferred.  Placed on Propafenone 150 mg BID. Switched to Propafenone  mg BID 3 years ago.  Still notes occasional palpitations. Acute onset and gradual offset, lasting up to 9 hours. She reports HR's are generally in the 130's. Occurring every couple of months.     ILR reveals no AF, episodes c/w sinus tachycardia.     On Propafenone   Echo 10/18/21 normal biventricular structure and function, normal left atrial size.  Spect stress 10/21/21 negative.    Today, she denies CP or SOB. Had an episode of tachycardia yesterday that lasted 1 hour. HR max 190 bpm. Off propafenone x 3 days prior to EPS/ablation.      Past Medical History:   Diagnosis Date    Atrial tachycardia     Hyperlipidemia     Hypertension     Ischemic colitis        Past Surgical History:   Procedure Laterality Date    TUBAL LIGATION         Review of patient's allergies indicates:   Allergen Reactions    Adhesive tape-silicones Other (See Comments)    Penicillins Rash     Yeast infection         No current facility-administered medications on file prior to encounter.     Current Outpatient Medications on File Prior to  Encounter   Medication Sig    aspirin 325 MG tablet Take 162.5 mg by mouth once daily.     cyanocobalamin 500 MCG tablet Vitamin B-12 500 mcg tablet   1 tablet; 500 MCG; Once a day    multivitamin capsule Take 1 capsule by mouth once daily.    quinapril (ACCUPRIL) 20 MG tablet Take by mouth. 1 Tablet Oral Every day    rosuvastatin (CRESTOR) 20 MG tablet Take 20 mg by mouth once daily.    propafenone (RYTHMOL SR) 225 MG Cp12 propafenone  mg capsule,extended release 12 hr   TAKE 1 CAPSULE BY MOUTH TWICE DAILY AS DIRECTED     Family History     Problem Relation (Age of Onset)    COPD Mother    Heart disease Father    Hypertension Brother, Sister    Neuropathy Brother    No Known Problems Daughter, Daughter        Tobacco Use    Smoking status: Former Smoker     Packs/day: 0.50     Years: 16.00     Pack years: 8.00     Quit date: 1985     Years since quittin.1    Smokeless tobacco: Never Used   Substance and Sexual Activity    Alcohol use: Yes     Alcohol/week: 1.0 standard drink     Types: 1 Glasses of wine per week     Comment: on occasion    Drug use: No    Sexual activity: Yes     Partners: Male     Comment:      Review of Systems   All other systems reviewed and are negative.    Objective:     Vital Signs (Most Recent):  Temp: 97.8 °F (36.6 °C) (22 0500)  Pulse: (!) 59 (22 0500)  Resp: 17 (22 0500)  BP: (!) 146/65 (22 0621)  SpO2: 99 % (22 0500) Vital Signs (24h Range):  Temp:  [97.8 °F (36.6 °C)] 97.8 °F (36.6 °C)  Pulse:  [59] 59  Resp:  [17] 17  SpO2:  [99 %] 99 %  BP: (138-146)/(63-65) 146/65       Weight: 55.3 kg (122 lb)  Body mass index is 21.61 kg/m².    SpO2: 99 %       Physical Exam   General: NAD. AAO  HENT: EOMI  Neck: No JVD  CV: RRR  Resp: No increased work of breathing  Ext: warm. No edema.      Significant Labs: All pertinent lab results from the last 24 hours have been reviewed.    Significant Imaging: Reviewed    Assessment and Plan:      SVT (supraventricular tachycardia)  Patient here for SVT EPS and RFA with Dr. Bishop    Anticoagulation: None  AAD/AVN agents: Propafenone (off for 3 days)    The risks, benefits and alternatives of the procedure were explained to the patient, patient's family and/or surrogate decision maker. Risks include (but not limited to) bleeding, hematoma, infection, pain, vascular damage, damage to structures surrounding the vasculature, myocardial damage [perforation, valvular damage], cardiac tamponade, phrenic nerve damage, , CVA, MI, and death. All questions were answered. Patient is understanding of these risks, and would like to proceed. Consents signed.          Kory Paz MD  Cardiac Electrophysiology  [facility] - Short Stay Cardiac Unit

## 2022-02-07 NOTE — PLAN OF CARE
Received report from HARI Sinha. Patient s/p SVT/RFA, AAOx4. VSS, no c/o pain or discomfort at this time, resp even and unlabored. bilateral groins gauze/tegaderm dressing is CDI. Pulses 2+.  No active bleeding. No hematoma noted. Post procedure protocol reviewed with patient and patient's . Understanding verbalized.  at bedside. Nurse call bell within reach. Will continue to monitor per post procedure protocol.

## 2022-02-07 NOTE — PROCEDURES
: Dr. Bishop  Date of procedure: 2/7/2022    Post-operative Diagnosis: AVNRT    Procedure Performed: RFA to the region of the slow pathway for treatment of AVNRT.     Description of Procedure: The patient was brought to the EP lab in the fasting state. Prepped and draped in sterile fashion. Safety timeout was performed. Sedation administered by anesthesia staff. Ultrasound guided venous access of the bilateral femoral veins was performed. HRA, HIS, and RV catheters placed via left femoral vein access. CS and Ablation catheters via right femoral vein access. Diagnostic EP study confirmed AVNRT. RFA to the slow pathway for treatment of AVNRT. Non inducible at end of study.     EBL: <10 mL    Specimens Removed: None  Complications: no immediate    Rivas Paz MD  PGY6

## 2022-02-07 NOTE — DISCHARGE SUMMARY
[facility] - Short Stay Cardiac Unit  Cardiac Electrophysiology  Discharge Summary      Patient Name: Gricelda Hassan  MRN: [id number]  Admission Date: 2/7/2022  Hospital Length of Stay: 0 days  Discharge Date and Time:  02/07/2022 2:36 PM  Attending Physician: No att. providers found    Discharging Provider: Kory Paz MD  Primary Care Physician: Win Gold MD    HPI:   Gricelda Hassan is a 71 y.o. female who presents for EPS and RFA of SVT.      HPI 70 yo female with atrial tachycardia, Htn.  Primary cardiologist is Dr. Gold. Previously seen by Dr. Carrasco.     Notes indicate paroxysmal AF, first diagnosed 7/4/19. She is not aware of this diagnosis. She reports atrial tachycardia.  She has noted palpitations since 2017. First episode occurred while in [place]. She was with a physician, and was told she needed to see an EP.   Wore a holter monitor, and was told she has atrial tachycardia. Offered RFA, she deferred.  Placed on Propafenone 150 mg BID. Switched to Propafenone  mg BID 3 years ago.  Still notes occasional palpitations. Acute onset and gradual offset, lasting up to 9 hours. She reports HR's are generally in the 130's. Occurring every couple of months.     ILR reveals no AF, episodes c/w sinus tachycardia.     On Propafenone   Echo 10/18/21 normal biventricular structure and function, normal left atrial size.  Spect stress 10/21/21 negative.    Today, she denies CP or SOB. Had an episode of tachycardia yesterday that lasted 1 hour. HR max 190 bpm. Off propafenone x 3 days prior to EPS/ablation.      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 Patient was non-inducible following RFA. No evidence of post-procedural complications including no hematoma or bleeding from groin access site.  I discussed activity restrictions as well as medication changes (stopping propafenone and to continue ASA for at least 1 more month from ablation perspective). HD stable and sating well on RA at discharge.      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  Service: Arrhythmia      Discharged Condition: stable    Disposition: Home or Self Care    Follow Up:    Patient Instructions:   No discharge procedures on file.  Medications:  Reconciled Home Medications:      Medication List      CONTINUE taking these medications    aspirin 325 MG tablet  Take 162.5 mg by mouth once daily.     cyanocobalamin 500 MCG tablet  Vitamin B-12 500 mcg tablet   1 tablet; 500 MCG; Once a day     gabapentin 300 MG capsule  Commonly known as: NEURONTIN  Take 300 mg by mouth nightly.     multivitamin capsule  Take 1 capsule by mouth once daily.     quinapriL 20 MG tablet  Commonly known as: ACCUPRIL  Take by mouth. 1 Tablet Oral Every day     rosuvastatin 20 MG tablet  Commonly known as: CRESTOR  Take 20 mg by mouth once daily.     traMADoL 50 mg tablet  Commonly known as: ULTRAM  Take 50 mg by mouth 3 (three) times daily as needed.        STOP taking these medications    propafenone 225 MG Cp12  Commonly known as: RYTHMOL SR            Time spent on the discharge of patient: 15 minutes    Kory Paz MD  Cardiac Electrophysiology  [facility] - Short Stay Cardiac Unit

## 2022-02-07 NOTE — ANESTHESIA PREPROCEDURE EVALUATION
2022  Pre-operative evaluation for Procedure(s) (LRB):  Ablation, SVT, Accessory Pathway (N/A)    Gricleda Hassan is a 71 y.o. female here for SVT ablation    Patient Active Problem List   Diagnosis    Mixed hyperlipidemia    Essential hypertension    Constipation, chronic    Former smoker: quit 1985: < 30 pack years    Osteopenia of multiple sites: see DEXA 2017    Atrial tachycardia    Paroxysmal atrial fibrillation       Review of patient's allergies indicates:   Allergen Reactions    Adhesive tape-silicones Other (See Comments)    Penicillins Rash     Yeast infection         No current facility-administered medications on file prior to encounter.     Current Outpatient Medications on File Prior to Encounter   Medication Sig Dispense Refill    aspirin 325 MG tablet Take 162.5 mg by mouth once daily.       cyanocobalamin 500 MCG tablet Vitamin B-12 500 mcg tablet   1 tablet; 500 MCG; Once a day      multivitamin capsule Take 1 capsule by mouth once daily.      quinapril (ACCUPRIL) 20 MG tablet Take by mouth. 1 Tablet Oral Every day      rosuvastatin (CRESTOR) 20 MG tablet Take 20 mg by mouth once daily.      propafenone (RYTHMOL SR) 225 MG Cp12 propafenone  mg capsule,extended release 12 hr   TAKE 1 CAPSULE BY MOUTH TWICE DAILY AS DIRECTED         Past Surgical History:   Procedure Laterality Date    TUBAL LIGATION         Social History     Socioeconomic History    Marital status:    Occupational History     Employer: [employer]   Tobacco Use    Smoking status: Former Smoker     Packs/day: 0.50     Years: 16.00     Pack years: 8.00     Quit date: 1985     Years since quittin.1    Smokeless tobacco: Never Used   Substance and Sexual Activity    Alcohol use: Yes     Alcohol/week: 1.0 standard drink     Types: 1 Glasses of wine per week     Comment: on  occasion    Drug use: No    Sexual activity: Yes     Partners: Male     Comment:            Anesthesia Evaluation    I have reviewed the Patient Summary Reports.    I have reviewed the Nursing Notes. I have reviewed the NPO Status.      Review of Systems  Anesthesia Hx:  No problems with previous Anesthesia    Cardiovascular:   Hypertension Dysrhythmias    Pulmonary:  Pulmonary Normal    Hepatic/GI:  Hepatic/GI Normal    Neurological:  Neurology Normal    Endocrine:  Endocrine Normal        Physical Exam  General:  Well nourished    Airway/Jaw/Neck:  Airway Findings: Mouth Opening: Normal Tongue: Normal  TM Distance: Normal, at least 6 cm       Chest/Lungs:  Chest/Lungs Findings: Normal Respiratory Rate     Heart/Vascular:  Heart Findings: Rate: Normal             Anesthesia Plan  Type of Anesthesia, risks & benefits discussed:  Anesthesia Type:  MAC, general    Patient's Preference:   Plan Factors:          Intra-op Monitoring Plan: standard ASA monitors  Intra-op Monitoring Plan Comments:   Post Op Pain Control Plan: multimodal analgesia  Post Op Pain Control Plan Comments:     Induction:   IV  Beta Blocker:         Informed Consent: Patient understands risks and agrees with Anesthesia plan.  Questions answered. Anesthesia consent signed with patient.  ASA Score: 2     Day of Surgery Review of History & Physical:            Ready For Surgery From Anesthesia Perspective.

## 2022-02-07 NOTE — SUBJECTIVE & OBJECTIVE
Past Medical History:   Diagnosis Date    Atrial tachycardia     Hyperlipidemia     Hypertension     Ischemic colitis        Past Surgical History:   Procedure Laterality Date    TUBAL LIGATION         Review of patient's allergies indicates:   Allergen Reactions    Adhesive tape-silicones Other (See Comments)    Penicillins Rash     Yeast infection         No current facility-administered medications on file prior to encounter.     Current Outpatient Medications on File Prior to Encounter   Medication Sig    aspirin 325 MG tablet Take 162.5 mg by mouth once daily.     cyanocobalamin 500 MCG tablet Vitamin B-12 500 mcg tablet   1 tablet; 500 MCG; Once a day    multivitamin capsule Take 1 capsule by mouth once daily.    quinapril (ACCUPRIL) 20 MG tablet Take by mouth. 1 Tablet Oral Every day    rosuvastatin (CRESTOR) 20 MG tablet Take 20 mg by mouth once daily.    propafenone (RYTHMOL SR) 225 MG Cp12 propafenone  mg capsule,extended release 12 hr   TAKE 1 CAPSULE BY MOUTH TWICE DAILY AS DIRECTED     Family History     Problem Relation (Age of Onset)    COPD Mother    Heart disease Father    Hypertension Brother, Sister    Neuropathy Brother    No Known Problems Daughter, Daughter        Tobacco Use    Smoking status: Former Smoker     Packs/day: 0.50     Years: 16.00     Pack years: 8.00     Quit date: 1985     Years since quittin.1    Smokeless tobacco: Never Used   Substance and Sexual Activity    Alcohol use: Yes     Alcohol/week: 1.0 standard drink     Types: 1 Glasses of wine per week     Comment: on occasion    Drug use: No    Sexual activity: Yes     Partners: Male     Comment:      Review of Systems   All other systems reviewed and are negative.    Objective:     Vital Signs (Most Recent):  Temp: 97.8 °F (36.6 °C) (22 0500)  Pulse: (!) 59 (22 0500)  Resp: 17 (22 0500)  BP: (!) 146/65 (22 0621)  SpO2: 99 % (22 0500) Vital Signs (24h  Range):  Temp:  [97.8 °F (36.6 °C)] 97.8 °F (36.6 °C)  Pulse:  [59] 59  Resp:  [17] 17  SpO2:  [99 %] 99 %  BP: (138-146)/(63-65) 146/65       Weight: 55.3 kg (122 lb)  Body mass index is 21.61 kg/m².    SpO2: 99 %       Physical Exam   General: NAD. AAO  HENT: EOMI  Neck: No JVD  CV: RRR  Resp: No increased work of breathing  Ext: warm. No edema.      Significant Labs: All pertinent lab results from the last 24 hours have been reviewed.    Significant Imaging: Reviewed

## 2022-02-07 NOTE — ASSESSMENT & PLAN NOTE
Patient here for SVT EPS and RFA with Dr. Bishop    Anticoagulation: None  AAD/AVN agents: Propafenone (off for 3 days)    The risks, benefits and alternatives of the procedure were explained to the patient, patient's family and/or surrogate decision maker. Risks include (but not limited to) bleeding, hematoma, infection, pain, vascular damage, damage to structures surrounding the vasculature, myocardial damage [perforation, valvular damage], cardiac tamponade, phrenic nerve damage, , CVA, MI, and death. All questions were answered. Patient is understanding of these risks, and would like to proceed. Consents signed.

## 2022-02-07 NOTE — TRANSFER OF CARE
"Anesthesia Transfer of Care Note    Patient: Gricelda Hassan    Procedure(s) Performed: Procedure(s) (LRB):  Ablation, SVT, Accessory Pathway (N/A)    Patient location: PACU    Anesthesia Type: general    Transport from OR: Transported from OR on 6-10 L/min O2 by face mask with adequate spontaneous ventilation    Post pain: adequate analgesia    Post assessment: no apparent anesthetic complications and tolerated procedure well    Post vital signs: stable    Level of consciousness: sedated and responds to stimulation    Nausea/Vomiting: no nausea/vomiting    Complications: none    Transfer of care protocol was followed      Last vitals:   Visit Vitals  BP (!) 146/65 (BP Location: Right arm, Patient Position: Lying)   Pulse (!) 59   Temp 36.6 °C (97.8 °F)   Resp 17   Ht 5' 3" (1.6 m)   Wt 55.3 kg (122 lb)   SpO2 99%   BMI 21.61 kg/m²     "

## 2022-02-07 NOTE — PLAN OF CARE
Patient discharged per MD orders. Instructions given on medications, wound care, activity, signs of infection, when to call MD, and follow up appointments. Pt verbalized understanding.  Patient AAOx4, VSS, no c/o pain or discomfort at this time. Bilateral groins with gauze/transparent dressings c/d/i. No active bleeding. No hematoma noted. Telemetry and PIV removed. Patient left unit via wheelchair with PCT accompanied by her .

## 2022-02-07 NOTE — PLAN OF CARE
Post procedure protocol completed. Pt ambulated in hallway without difficulty. Bilateral groins gauze/transparent dressings c/d/i. No active bleeding. No hematoma noted.  at bedside. Nurse call bell within reach. Will monitor

## 2022-02-07 NOTE — Clinical Note
The groin was prepped. The site was prepped with ChloraPrep. The site was clipped. The patient was draped. The patient was positioned supine. The patient was secured using safety straps, with ulnar pads and to an armboard.

## 2022-02-08 ENCOUNTER — TELEPHONE (OUTPATIENT)
Dept: ELECTROPHYSIOLOGY | Facility: CLINIC | Age: 72
End: 2022-02-08
Payer: MEDICARE

## 2022-02-10 ENCOUNTER — TELEPHONE (OUTPATIENT)
Dept: ELECTROPHYSIOLOGY | Facility: CLINIC | Age: 72
End: 2022-02-10
Payer: MEDICARE

## 2022-02-10 NOTE — TELEPHONE ENCOUNTER
Spoke to: Gricelda Hassan     Does the patient have any concerns, questions about post op instructions?no     Does the patient have any concerns, questions about wound site?no  Has the patient resumed medications and/or picked up new prescriptions ordered at discharge?yes    Does the patient have any other questions regarding their procedure? No  States she is feeling great.  Walked 2 miles at a slower pace and her HR was not effected.   She denies CP or SOB.  Groin sites are without hematoma, no signs of infections.  Post op appt already made.       Patient verbalized understanding of her instructions and had no further questions on concerns.

## 2022-02-10 NOTE — TELEPHONE ENCOUNTER
Called patient for post op evaluation.   No answer. Left message to inform reason for call to eval. Post op symptoms, complications, questions or concerns.   Call back number provided.

## 2022-02-25 ENCOUNTER — PATIENT MESSAGE (OUTPATIENT)
Dept: CARDIOLOGY | Facility: CLINIC | Age: 72
End: 2022-02-25
Payer: MEDICARE

## 2022-04-12 ENCOUNTER — TELEPHONE (OUTPATIENT)
Dept: OBSTETRICS AND GYNECOLOGY | Facility: CLINIC | Age: 72
End: 2022-04-12
Payer: MEDICARE

## 2022-04-12 DIAGNOSIS — M81.0 AGE-RELATED OSTEOPOROSIS WITHOUT CURRENT PATHOLOGICAL FRACTURE: ICD-10-CM

## 2022-04-12 DIAGNOSIS — Z01.419 WELL WOMAN EXAM WITH ROUTINE GYNECOLOGICAL EXAM: ICD-10-CM

## 2022-04-12 DIAGNOSIS — Z12.31 ENCOUNTER FOR SCREENING MAMMOGRAM FOR MALIGNANT NEOPLASM OF BREAST: ICD-10-CM

## 2022-04-12 DIAGNOSIS — Z12.39 ENCOUNTER FOR SCREENING FOR MALIGNANT NEOPLASM OF BREAST, UNSPECIFIED SCREENING MODALITY: Primary | ICD-10-CM

## 2022-04-12 NOTE — TELEPHONE ENCOUNTER
----- Message from Kami Rollins sent at 4/12/2022 11:16 AM CDT -----  Contact: Pt  Type:  Mammogram & DEXA    Caller is requesting to schedule their annual mammogram appointment.  Order is not listed in EPIC.  Please enter order and contact patient to schedule.    Name of Caller:  Pt    Where would they like the mammogram performed?  [facility]    [facility] Call Back Number:  [phone]    Additional Information: Pt would like to schedule mammogram & DEXA prior to appt w/ Dr. Wiedemann on 7/1.  Please call back. Thanks.       Patent

## 2022-05-04 ENCOUNTER — TELEPHONE (OUTPATIENT)
Dept: CARDIOLOGY | Facility: HOSPITAL | Age: 72
End: 2022-05-04
Payer: MEDICARE

## 2022-05-04 DIAGNOSIS — Z95.818 STATUS POST PLACEMENT OF IMPLANTABLE LOOP RECORDER: Primary | ICD-10-CM

## 2022-05-04 DIAGNOSIS — I47.19 ATRIAL TACHYCARDIA: ICD-10-CM

## 2022-05-04 NOTE — TELEPHONE ENCOUNTER
Left message requesting arrival time @ 10 on 5/9 for ILR interrogation prior to seeing Dr. Bishop. Left contact info if pt. And requested callback if pt. Has any questions or concerns

## 2022-05-06 NOTE — PROGRESS NOTES
"Subjective:    Patient ID:  Gricelda Hassan is a 71 y.o. female who presents for follow-up of Palpitations      HPI 70 yo female with SVT, Htn.    Background:  Primary cardiologist is Dr. Gold. Previously seen by Dr. Carrasco.     Notes indicate paroxysmal AF, first diagnosed 7/4/19. She is not aware of this diagnosis. She reports atrial tachycardia.  She has noted palpitations since 2017. First episode occurred while in [place]. She was with a physician, and was told she needed to see an EP.   Wore a holter monitor, and was told she has atrial tachycardia. Offered RFA, she deferred.  Placed on Propafenone 150 mg BID. Switched to Propafenone  mg BID 3 years ago.  Still notes occasional palpitations. Acute onset and gradual offset, lasting up to 9 hours. She reports HR's are generally in the 130's. Occurring every couple of months.     ILR reveals no AF, episodes c/w sinus tachycardia.     On Propafenone   Echo 10/18/21 normal biventricular structure and function, normal left atrial size.  Spect stress 10/21/21 negative.    Update:  RFA 2/7/22 Typical AVNRT induced. Slow pathway ablation performed. Propafenone discontinued.    Has palpitations lasting a few seconds, nothing sustained. "It feels like it is going to start, but it doesn't." Does not find them particularly troublesome.    ILR reveals one episode of nonsustained SVT at 5 seconds      Review of Systems   Constitutional: Negative. Negative for fever and malaise/fatigue.   HENT: Negative for congestion and sore throat.    Cardiovascular: Positive for palpitations. Negative for chest pain, dyspnea on exertion, irregular heartbeat, leg swelling, near-syncope, orthopnea, paroxysmal nocturnal dyspnea and syncope.   Respiratory: Negative for cough and shortness of breath.    Gastrointestinal: Negative for abdominal pain, constipation and diarrhea.   Neurological: Negative for dizziness, light-headedness and weakness.   Psychiatric/Behavioral: Negative " for depression. The patient is not nervous/anxious.    All other systems reviewed and are negative.       Objective:    Physical Exam  Constitutional:       Appearance: She is well-developed.   Eyes:      General: No scleral icterus.     Conjunctiva/sclera: Conjunctivae normal.   Neck:      Vascular: No JVD.      Trachea: No tracheal deviation.   Cardiovascular:      Rate and Rhythm: Normal rate and regular rhythm.      Chest Wall: PMI is not displaced.   Pulmonary:      Effort: Pulmonary effort is normal. No respiratory distress.      Breath sounds: Normal breath sounds.   Abdominal:      Palpations: Abdomen is soft.      Tenderness: There is no abdominal tenderness.   Musculoskeletal:         General: No tenderness.   Skin:     General: Skin is warm and dry.      Findings: No rash.   Neurological:      Mental Status: She is alert and oriented to person, place, and time.   Psychiatric:         Behavior: Behavior normal.           Assessment:       1. AVNRT (AV masood re-entry tachycardia)    2. Essential hypertension         Plan:           S/p slow pathway ablation. Not having recurrence, but is having PAC's.  Offered, did not recommend, Cardizem  mg daily. She prefers to defer, and this is reasonable.  F/u in 6 months.

## 2022-05-09 ENCOUNTER — OFFICE VISIT (OUTPATIENT)
Dept: CARDIOLOGY | Facility: CLINIC | Age: 72
End: 2022-05-09
Payer: MEDICARE

## 2022-05-09 ENCOUNTER — CLINICAL SUPPORT (OUTPATIENT)
Dept: CARDIOLOGY | Facility: HOSPITAL | Age: 72
End: 2022-05-09
Attending: INTERNAL MEDICINE
Payer: MEDICARE

## 2022-05-09 VITALS
SYSTOLIC BLOOD PRESSURE: 143 MMHG | WEIGHT: 124.13 LBS | HEIGHT: 63 IN | BODY MASS INDEX: 21.99 KG/M2 | HEART RATE: 59 BPM | DIASTOLIC BLOOD PRESSURE: 88 MMHG

## 2022-05-09 DIAGNOSIS — Z95.818 STATUS POST PLACEMENT OF IMPLANTABLE LOOP RECORDER: ICD-10-CM

## 2022-05-09 DIAGNOSIS — I47.19 ATRIAL TACHYCARDIA: ICD-10-CM

## 2022-05-09 DIAGNOSIS — I47.19 AVNRT (AV NODAL RE-ENTRY TACHYCARDIA): Primary | ICD-10-CM

## 2022-05-09 DIAGNOSIS — I10 ESSENTIAL HYPERTENSION: ICD-10-CM

## 2022-05-09 PROCEDURE — 93005 ELECTROCARDIOGRAM TRACING: CPT | Mod: PO

## 2022-05-09 PROCEDURE — 99215 PR OFFICE/OUTPT VISIT, EST, LEVL V, 40-54 MIN: ICD-10-PCS | Mod: S$PBB,,, | Performed by: INTERNAL MEDICINE

## 2022-05-09 PROCEDURE — 93010 RHYTHM STRIP: ICD-10-PCS | Mod: ,,, | Performed by: INTERNAL MEDICINE

## 2022-05-09 PROCEDURE — 99213 OFFICE O/P EST LOW 20 MIN: CPT | Mod: PBBFAC,PO | Performed by: INTERNAL MEDICINE

## 2022-05-09 PROCEDURE — 99215 OFFICE O/P EST HI 40 MIN: CPT | Mod: S$PBB,,, | Performed by: INTERNAL MEDICINE

## 2022-05-09 PROCEDURE — 99999 PR PBB SHADOW E&M-EST. PATIENT-LVL III: CPT | Mod: PBBFAC,,, | Performed by: INTERNAL MEDICINE

## 2022-05-09 PROCEDURE — 93010 ELECTROCARDIOGRAM REPORT: CPT | Mod: ,,, | Performed by: INTERNAL MEDICINE

## 2022-05-09 PROCEDURE — 99999 PR PBB SHADOW E&M-EST. PATIENT-LVL III: ICD-10-PCS | Mod: PBBFAC,,, | Performed by: INTERNAL MEDICINE

## 2022-05-09 RX ORDER — CALCIUM CARBONATE 500(1250)
1 TABLET ORAL DAILY
COMMUNITY

## 2022-05-12 DIAGNOSIS — I47.19 AVNRT (AV NODAL RE-ENTRY TACHYCARDIA): Primary | ICD-10-CM

## 2022-06-03 ENCOUNTER — TELEPHONE (OUTPATIENT)
Dept: HEMATOLOGY/ONCOLOGY | Facility: CLINIC | Age: 72
End: 2022-06-03
Payer: MEDICARE

## 2022-06-03 ENCOUNTER — PATIENT MESSAGE (OUTPATIENT)
Dept: HEMATOLOGY/ONCOLOGY | Facility: CLINIC | Age: 72
End: 2022-06-03
Payer: MEDICARE

## 2022-06-03 NOTE — TELEPHONE ENCOUNTER
Left message to call the office to schedule/reschedule appt. Recall number provided.  ----- Message from Angelica Yan sent at 6/2/2022  4:28 PM CDT -----  Regarding: Dr Garay  Type:Needs Medical Advice    Who Called:PT  Best call back number:[phone]  Additional Info: Requesting a call back regarding#PT would like to move appt that is scheduled for 7/11 up to the 6/29, 6/30 or 7/1  Please Advise- Thank you

## 2022-06-27 RX ORDER — CALC/FA/B/D3/E/BIOFLAV/SOYBEAN 600 MG-1MG
CAPSULE ORAL
COMMUNITY
End: 2022-12-19

## 2022-06-29 ENCOUNTER — HOSPITAL ENCOUNTER (OUTPATIENT)
Dept: RADIOLOGY | Facility: HOSPITAL | Age: 72
Discharge: HOME | End: 2022-06-29
Attending: OBSTETRICS & GYNECOLOGY
Payer: MEDICARE

## 2022-06-29 DIAGNOSIS — Z01.419 WELL WOMAN EXAM WITH ROUTINE GYNECOLOGICAL EXAM: ICD-10-CM

## 2022-06-29 DIAGNOSIS — Z12.31 ENCOUNTER FOR SCREENING MAMMOGRAM FOR MALIGNANT NEOPLASM OF BREAST: ICD-10-CM

## 2022-06-29 DIAGNOSIS — M81.0 AGE-RELATED OSTEOPOROSIS WITHOUT CURRENT PATHOLOGICAL FRACTURE: ICD-10-CM

## 2022-06-29 DIAGNOSIS — Z12.39 ENCOUNTER FOR SCREENING FOR MALIGNANT NEOPLASM OF BREAST, UNSPECIFIED SCREENING MODALITY: ICD-10-CM

## 2022-06-29 PROCEDURE — 77063 MAMMO DIGITAL SCREENING BILAT WITH TOMO: ICD-10-PCS | Mod: 26,,, | Performed by: RADIOLOGY

## 2022-06-29 PROCEDURE — 77067 SCR MAMMO BI INCL CAD: CPT | Mod: 26,,, | Performed by: RADIOLOGY

## 2022-06-29 PROCEDURE — 77080 DXA BONE DENSITY AXIAL: CPT | Mod: 26,,, | Performed by: RADIOLOGY

## 2022-06-29 PROCEDURE — 77063 BREAST TOMOSYNTHESIS BI: CPT | Mod: 26,,, | Performed by: RADIOLOGY

## 2022-06-29 PROCEDURE — 77063 BREAST TOMOSYNTHESIS BI: CPT | Mod: TC,PO

## 2022-06-29 PROCEDURE — 77080 DEXA BONE DENSITY SPINE HIP: ICD-10-PCS | Mod: 26,,, | Performed by: RADIOLOGY

## 2022-06-29 PROCEDURE — 77067 MAMMO DIGITAL SCREENING BILAT WITH TOMO: ICD-10-PCS | Mod: 26,,, | Performed by: RADIOLOGY

## 2022-06-29 PROCEDURE — 77067 SCR MAMMO BI INCL CAD: CPT | Mod: TC,PO

## 2022-06-29 PROCEDURE — 77080 DXA BONE DENSITY AXIAL: CPT | Mod: TC,PO

## 2022-06-30 ENCOUNTER — OFFICE VISIT (OUTPATIENT)
Dept: HEMATOLOGY/ONCOLOGY | Facility: CLINIC | Age: 72
End: 2022-06-30
Payer: MEDICARE

## 2022-06-30 ENCOUNTER — LAB VISIT (OUTPATIENT)
Dept: LAB | Facility: HOSPITAL | Age: 72
End: 2022-06-30
Attending: INTERNAL MEDICINE
Payer: MEDICARE

## 2022-06-30 VITALS
WEIGHT: 123.88 LBS | OXYGEN SATURATION: 98 % | HEART RATE: 64 BPM | DIASTOLIC BLOOD PRESSURE: 80 MMHG | TEMPERATURE: 98 F | BODY MASS INDEX: 21.95 KG/M2 | SYSTOLIC BLOOD PRESSURE: 150 MMHG | HEIGHT: 63 IN

## 2022-06-30 DIAGNOSIS — D75.89 MACROCYTOSIS: ICD-10-CM

## 2022-06-30 DIAGNOSIS — E87.5 HYPERKALEMIA: ICD-10-CM

## 2022-06-30 DIAGNOSIS — I10 ESSENTIAL HYPERTENSION: ICD-10-CM

## 2022-06-30 DIAGNOSIS — E78.5 HYPERLIPIDEMIA, UNSPECIFIED HYPERLIPIDEMIA TYPE: ICD-10-CM

## 2022-06-30 DIAGNOSIS — D75.89 MACROCYTOSIS: Primary | ICD-10-CM

## 2022-06-30 DIAGNOSIS — I47.10 SVT (SUPRAVENTRICULAR TACHYCARDIA): ICD-10-CM

## 2022-06-30 DIAGNOSIS — R30.0 DYSURIA: ICD-10-CM

## 2022-06-30 LAB
ALBUMIN SERPL BCP-MCNC: 3.7 G/DL (ref 3.5–5.2)
ALP SERPL-CCNC: 65 U/L (ref 55–135)
ALT SERPL W/O P-5'-P-CCNC: 19 U/L (ref 10–44)
ANION GAP SERPL CALC-SCNC: 10 MMOL/L (ref 8–16)
AST SERPL-CCNC: 17 U/L (ref 10–40)
BASOPHILS # BLD AUTO: 0.04 K/UL (ref 0–0.2)
BASOPHILS NFR BLD: 0.4 % (ref 0–1.9)
BILIRUB SERPL-MCNC: 0.5 MG/DL (ref 0.1–1)
BUN SERPL-MCNC: 17 MG/DL (ref 8–23)
CALCIUM SERPL-MCNC: 9.3 MG/DL (ref 8.7–10.5)
CHLORIDE SERPL-SCNC: 101 MMOL/L (ref 95–110)
CO2 SERPL-SCNC: 25 MMOL/L (ref 23–29)
CREAT SERPL-MCNC: 0.8 MG/DL (ref 0.5–1.4)
DIFFERENTIAL METHOD: ABNORMAL
EOSINOPHIL # BLD AUTO: 0.1 K/UL (ref 0–0.5)
EOSINOPHIL NFR BLD: 1 % (ref 0–8)
ERYTHROCYTE [DISTWIDTH] IN BLOOD BY AUTOMATED COUNT: 12.3 % (ref 11.5–14.5)
EST. GFR  (AFRICAN AMERICAN): >60 ML/MIN/1.73 M^2
EST. GFR  (NON AFRICAN AMERICAN): >60 ML/MIN/1.73 M^2
GLUCOSE SERPL-MCNC: 98 MG/DL (ref 70–110)
HCT VFR BLD AUTO: 38.6 % (ref 37–48.5)
HGB BLD-MCNC: 12.7 G/DL (ref 12–16)
IMM GRANULOCYTES # BLD AUTO: 0.03 K/UL (ref 0–0.04)
IMM GRANULOCYTES NFR BLD AUTO: 0.3 % (ref 0–0.5)
LDH SERPL L TO P-CCNC: 151 U/L (ref 110–260)
LYMPHOCYTES # BLD AUTO: 1.5 K/UL (ref 1–4.8)
LYMPHOCYTES NFR BLD: 16.8 % (ref 18–48)
MCH RBC QN AUTO: 33.2 PG (ref 27–31)
MCHC RBC AUTO-ENTMCNC: 32.9 G/DL (ref 32–36)
MCV RBC AUTO: 101 FL (ref 82–98)
MONOCYTES # BLD AUTO: 0.7 K/UL (ref 0.3–1)
MONOCYTES NFR BLD: 7.4 % (ref 4–15)
NEUTROPHILS # BLD AUTO: 6.8 K/UL (ref 1.8–7.7)
NEUTROPHILS NFR BLD: 74.1 % (ref 38–73)
NRBC BLD-RTO: 0 /100 WBC
PLATELET # BLD AUTO: 372 K/UL (ref 150–450)
PMV BLD AUTO: 9.2 FL (ref 9.2–12.9)
POTASSIUM SERPL-SCNC: 5.3 MMOL/L (ref 3.5–5.1)
PROT SERPL-MCNC: 7.1 G/DL (ref 6–8.4)
RBC # BLD AUTO: 3.83 M/UL (ref 4–5.4)
SODIUM SERPL-SCNC: 136 MMOL/L (ref 136–145)
URATE SERPL-MCNC: 3.4 MG/DL (ref 2.4–5.7)
WBC # BLD AUTO: 9.11 K/UL (ref 3.9–12.7)

## 2022-06-30 PROCEDURE — 87086 URINE CULTURE/COLONY COUNT: CPT | Performed by: OBSTETRICS & GYNECOLOGY

## 2022-06-30 PROCEDURE — 84550 ASSAY OF BLOOD/URIC ACID: CPT | Mod: PN | Performed by: INTERNAL MEDICINE

## 2022-06-30 PROCEDURE — 81003 URINALYSIS AUTO W/O SCOPE: CPT | Mod: PO | Performed by: OBSTETRICS & GYNECOLOGY

## 2022-06-30 PROCEDURE — 99999 PR PBB SHADOW E&M-EST. PATIENT-LVL III: ICD-10-PCS | Mod: PBBFAC,,, | Performed by: INTERNAL MEDICINE

## 2022-06-30 PROCEDURE — 80053 COMPREHEN METABOLIC PANEL: CPT | Mod: PN | Performed by: INTERNAL MEDICINE

## 2022-06-30 PROCEDURE — 99214 PR OFFICE/OUTPT VISIT, EST, LEVL IV, 30-39 MIN: ICD-10-PCS | Mod: S$PBB,,, | Performed by: INTERNAL MEDICINE

## 2022-06-30 PROCEDURE — 99999 PR PBB SHADOW E&M-EST. PATIENT-LVL III: CPT | Mod: PBBFAC,,, | Performed by: INTERNAL MEDICINE

## 2022-06-30 PROCEDURE — 99213 OFFICE O/P EST LOW 20 MIN: CPT | Mod: PBBFAC,PN | Performed by: INTERNAL MEDICINE

## 2022-06-30 PROCEDURE — 85025 COMPLETE CBC W/AUTO DIFF WBC: CPT | Mod: PN | Performed by: INTERNAL MEDICINE

## 2022-06-30 PROCEDURE — 36415 COLL VENOUS BLD VENIPUNCTURE: CPT | Mod: PN | Performed by: INTERNAL MEDICINE

## 2022-06-30 PROCEDURE — 83615 LACTATE (LD) (LDH) ENZYME: CPT | Mod: PN | Performed by: INTERNAL MEDICINE

## 2022-06-30 PROCEDURE — 99214 OFFICE O/P EST MOD 30 MIN: CPT | Mod: S$PBB,,, | Performed by: INTERNAL MEDICINE

## 2022-06-30 NOTE — PROGRESS NOTES
PATIENT: Gricelda Hassan  MRN: [id number]  DATE: 6/30/2022      Diagnosis:   1. Macrocytosis    2. Hyperkalemia    3. Essential hypertension    4. Hyperlipidemia, unspecified hyperlipidemia type    5. SVT (supraventricular tachycardia)        Chief Complaint: Follow-up (Macrocytosis)    Subjective:     Interval History: Ms. Hassan is a 71 y.o. female with HTN, HLD, Atrial Tachycardia who presents for follow up for macrocytosis.  Lab work on 7/06/21 showed normal LDH, retic, LDH, MMA, homocysteine and TSH.  Since the last clinic visit the patient underwent a cardiac ablation on 2/07/22 for SVT.  She states she has not felt any more palpitations.  The patient denies CP, cough, SOB, abdominal pain, N/V, constipation, diarrhea.  The patient denies fever, chills, night sweats, weight loss, new lumps or bumps, easy bruising or bleeding.    Past Medical History:   Past Medical History:   Diagnosis Date    Atrial tachycardia     Hyperlipidemia     Hypertension     Ischemic colitis        Past Surgical HIstory:   Past Surgical History:   Procedure Laterality Date    TUBAL LIGATION         Family History:   Family History   Problem Relation Age of Onset    COPD Mother     Heart disease Father         CABG age 60    Hypertension Brother     Neuropathy Brother     Hypertension Sister     No Known Problems Daughter     No Known Problems Daughter        Social History:  reports that she quit smoking about 37 years ago. She has a 8.00 pack-year smoking history. She has never used smokeless tobacco. She reports current alcohol use of about 1.0 standard drink of alcohol per week. She reports that she does not use drugs.    Allergies:  Review of patient's allergies indicates:   Allergen Reactions    Adhesive tape-silicones Other (See Comments)    Penicillins Rash     Yeast infection         Medications:  Current Outpatient Medications   Medication Sig Dispense Refill    aspirin 325 MG tablet Take 162.5 mg by mouth once  "daily.       [id number] xt (CALCI-MAX) 600 mg-1 mg-500 unit-30 unit Cap       calcium carbonate (OS-PAULINE) 500 mg calcium (1,250 mg) tablet Take 1 tablet by mouth once daily.      cyanocobalamin 500 MCG tablet Vitamin B-12 500 mcg tablet   1 tablet; 500 MCG; Once a day      multivitamin capsule Take 1 capsule by mouth once daily.      quinapril (ACCUPRIL) 20 MG tablet Take by mouth. 1 Tablet Oral Every day      rosuvastatin (CRESTOR) 20 MG tablet Take 20 mg by mouth once daily.       No current facility-administered medications for this visit.     Facility-Administered Medications Ordered in Other Visits   Medication Dose Route Frequency Provider Last Rate Last Admin    sodium chloride 0.9% bolus 1,000 mL  1,000 mL Intravenous Once Idalia Aguilera NP           Review of Systems   Constitutional: Negative for appetite change, chills, fatigue, fever and unexpected weight change.   HENT: Negative for mouth sores.    Eyes: Negative for visual disturbance.   Respiratory: Negative for cough and shortness of breath.    Cardiovascular: Negative for chest pain and palpitations.   Gastrointestinal: Negative for abdominal pain, constipation, diarrhea, nausea and vomiting.   Genitourinary: Negative for frequency.   Skin: Negative for rash.   Neurological: Negative for headaches.   Hematological: Negative for adenopathy. Does not bruise/bleed easily.   Psychiatric/Behavioral: The patient is not nervous/anxious.        ECOG Performance Status: 0   Objective:      Vitals:   Vitals:    06/30/22 1033   BP: (!) 150/80   BP Location: Right arm   Patient Position: Sitting   BP Method: Medium (Manual)   Pulse: 64   Temp: 97.9 °F (36.6 °C)   TempSrc: Temporal   SpO2: 98%   Weight: 56.2 kg (123 lb 14.4 oz)   Height: 5' 3" (1.6 m)       Physical Exam  Constitutional:       General: She is not in acute distress.     Appearance: She is well-developed. She is not diaphoretic.   HENT:      Head: Normocephalic and " atraumatic.   Cardiovascular:      Rate and Rhythm: Normal rate and regular rhythm.      Heart sounds: Normal heart sounds. No murmur heard.    No friction rub. No gallop.   Pulmonary:      Effort: Pulmonary effort is normal. No respiratory distress.      Breath sounds: Normal breath sounds. No wheezing or rales.   Chest:      Chest wall: No tenderness.   Breasts:      Right: No axillary adenopathy or supraclavicular adenopathy.      Left: No axillary adenopathy or supraclavicular adenopathy.       Abdominal:      General: Bowel sounds are normal. There is no distension.      Palpations: Abdomen is soft. There is no mass.      Tenderness: There is no abdominal tenderness. There is no guarding or rebound.   Lymphadenopathy:      Cervical: No cervical adenopathy.      Upper Body:      Right upper body: No supraclavicular or axillary adenopathy.      Left upper body: No supraclavicular or axillary adenopathy.   Skin:     Findings: No erythema or rash.   Neurological:      Mental Status: She is alert and oriented to person, place, and time.   Psychiatric:         Behavior: Behavior normal.         Laboratory Data:  Lab Visit on 06/30/2022   Component Date Value Ref Range Status    WBC 06/30/2022 9.11  3.90 - 12.70 K/uL Final    RBC 06/30/2022 3.83 (A) 4.00 - 5.40 M/uL Final    Hemoglobin 06/30/2022 12.7  12.0 - 16.0 g/dL Final    Hematocrit 06/30/2022 38.6  37.0 - 48.5 % Final    MCV 06/30/2022 101 (A) 82 - 98 fL Final    MCH 06/30/2022 33.2 (A) 27.0 - 31.0 pg Final    MCHC 06/30/2022 32.9  32.0 - 36.0 g/dL Final    RDW 06/30/2022 12.3  11.5 - 14.5 % Final    Platelets 06/30/2022 372  150 - 450 K/uL Final    MPV 06/30/2022 9.2  9.2 - 12.9 fL Final    Immature Granulocytes 06/30/2022 0.3  0.0 - 0.5 % Final    Gran # (ANC) 06/30/2022 6.8  1.8 - 7.7 K/uL Final    Immature Grans (Abs) 06/30/2022 0.03  0.00 - 0.04 K/uL Final    Comment: Mild elevation in immature granulocytes is non specific and   can be seen in  a variety of conditions including stress response,   acute inflammation, trauma and pregnancy. Correlation with other   laboratory and clinical findings is essential.      Lymph # 06/30/2022 1.5  1.0 - 4.8 K/uL Final    Mono # 06/30/2022 0.7  0.3 - 1.0 K/uL Final    Eos # 06/30/2022 0.1  0.0 - 0.5 K/uL Final    Baso # 06/30/2022 0.04  0.00 - 0.20 K/uL Final    nRBC 06/30/2022 0  0 /100 WBC Final    Gran % 06/30/2022 74.1 (A) 38.0 - 73.0 % Final    Lymph % 06/30/2022 16.8 (A) 18.0 - 48.0 % Final    Mono % 06/30/2022 7.4  4.0 - 15.0 % Final    Eosinophil % 06/30/2022 1.0  0.0 - 8.0 % Final    Basophil % 06/30/2022 0.4  0.0 - 1.9 % Final    Differential Method 06/30/2022 Automated   Final    Sodium 06/30/2022 136  136 - 145 mmol/L Final    Potassium 06/30/2022 5.3 (A) 3.5 - 5.1 mmol/L Final    Chloride 06/30/2022 101  95 - 110 mmol/L Final    CO2 06/30/2022 25  23 - 29 mmol/L Final    Glucose 06/30/2022 98  70 - 110 mg/dL Final    BUN 06/30/2022 17  8 - 23 mg/dL Final    Creatinine 06/30/2022 0.8  0.5 - 1.4 mg/dL Final    Calcium 06/30/2022 9.3  8.7 - 10.5 mg/dL Final    Total Protein 06/30/2022 7.1  6.0 - 8.4 g/dL Final    Albumin 06/30/2022 3.7  3.5 - 5.2 g/dL Final    Total Bilirubin 06/30/2022 0.5  0.1 - 1.0 mg/dL Final    Comment: For infants and newborns, interpretation of results should be based  on gestational age, weight and in agreement with clinical  observations.    Premature Infant recommended reference ranges:  Up to 24 hours.............<8.0 mg/dL  Up to 48 hours............<12.0 mg/dL  3-5 days..................<15.0 mg/dL  6-29 days.................<15.0 mg/dL      Alkaline Phosphatase 06/30/2022 65  55 - 135 U/L Final    AST 06/30/2022 17  10 - 40 U/L Final    ALT 06/30/2022 19  10 - 44 U/L Final    Anion Gap 06/30/2022 10  8 - 16 mmol/L Final    eGFR if African American 06/30/2022 >60  >60 mL/min/1.73 m^2 Final    eGFR if non African American 06/30/2022 >60  >60  mL/min/1.73 m^2 Final    Comment: Calculation used to obtain the estimated glomerular filtration  rate (eGFR) is the CKD-EPI equation.       LD 06/30/2022 151  110 - 260 U/L Final    Results are increased in hemolyzed samples.    Uric Acid 06/30/2022 3.4  2.4 - 5.7 mg/dL Final         Imaging: Reviewed    Assessment:       1. Macrocytosis    2. Hyperkalemia    3. Essential hypertension    4. Hyperlipidemia, unspecified hyperlipidemia type    5. SVT (supraventricular tachycardia)           Plan:     Macrocytosis - The patient has a long standing macrocytosis  -Laboratory assessment on 7/06/21 showed a normal MMA and homocysteine level ruling our B12 and folic acid deficiency  -Retic, LDH and haptoglobin were normal ruling out hemolysis  -TSH was normal ruling out hypothyroidism  -The peripheral smear was non informative  -The patient likely has macrocytosis from age or from MDS  -Blood counts on 6/30/22  -Will continue to monitor every 6 months    Hyperkalemia - pt's potassium is wendy elevated at 5.3 on 6/30/22  -Could be due to ACEi  -Pt with normal renal function  -Will monitor    HTN - pt on quinapril  -Stable  -PCP managing    HDL - pt on crestor  -Management per cardiology    SVT - pt s/p ablation on 2/07/22  -Management per cardiology    Route Chart for Scheduling    Med Onc Chart Routing      Follow up with physician 6 months. The patient needs a CBC, CMP, LDH and uric acid in 6 months with a return appt at that time.   Follow up with XU    Infusion scheduling note    Injection scheduling note    Labs    Imaging    Pharmacy appointment    Other referrals               Edy Dejesus MD  [facility]  Hematology and Oncology  [facility]   [address]   [place] [address]   O: [phone]  F: [phone]

## 2022-07-01 ENCOUNTER — OFFICE VISIT (OUTPATIENT)
Dept: OBSTETRICS AND GYNECOLOGY | Facility: CLINIC | Age: 72
End: 2022-07-01
Payer: MEDICARE

## 2022-07-01 VITALS
DIASTOLIC BLOOD PRESSURE: 84 MMHG | WEIGHT: 123.25 LBS | SYSTOLIC BLOOD PRESSURE: 156 MMHG | BODY MASS INDEX: 21.83 KG/M2

## 2022-07-01 DIAGNOSIS — R30.0 DYSURIA: Primary | ICD-10-CM

## 2022-07-01 DIAGNOSIS — Z01.419 WELL WOMAN EXAM WITH ROUTINE GYNECOLOGICAL EXAM: ICD-10-CM

## 2022-07-01 LAB
BILIRUB UR QL STRIP: NEGATIVE
CLARITY UR: CLEAR
COLOR UR: YELLOW
GLUCOSE UR QL STRIP: NEGATIVE
HGB UR QL STRIP: NEGATIVE
KETONES UR QL STRIP: NEGATIVE
LEUKOCYTE ESTERASE UR QL STRIP: NEGATIVE
NITRITE UR QL STRIP: NEGATIVE
PH UR STRIP: 7 [PH] (ref 5–8)
PROT UR QL STRIP: NEGATIVE
SP GR UR STRIP: 1.01 (ref 1–1.03)
URN SPEC COLLECT METH UR: NORMAL
UROBILINOGEN UR STRIP-ACNC: NORMAL EU/DL

## 2022-07-01 PROCEDURE — G0101 CA SCREEN;PELVIC/BREAST EXAM: HCPCS | Mod: S$PBB,GZ,, | Performed by: OBSTETRICS & GYNECOLOGY

## 2022-07-01 PROCEDURE — 99213 OFFICE O/P EST LOW 20 MIN: CPT | Mod: PBBFAC,PO | Performed by: OBSTETRICS & GYNECOLOGY

## 2022-07-01 PROCEDURE — G0101 PR CA SCREEN;PELVIC/BREAST EXAM: ICD-10-PCS | Mod: S$PBB,GZ,, | Performed by: OBSTETRICS & GYNECOLOGY

## 2022-07-01 PROCEDURE — G0101 CA SCREEN;PELVIC/BREAST EXAM: HCPCS | Mod: PBBFAC,PO | Performed by: OBSTETRICS & GYNECOLOGY

## 2022-07-01 PROCEDURE — 99999 PR PBB SHADOW E&M-EST. PATIENT-LVL III: ICD-10-PCS | Mod: PBBFAC,,, | Performed by: OBSTETRICS & GYNECOLOGY

## 2022-07-01 PROCEDURE — 99999 PR PBB SHADOW E&M-EST. PATIENT-LVL III: CPT | Mod: PBBFAC,,, | Performed by: OBSTETRICS & GYNECOLOGY

## 2022-07-01 NOTE — PROGRESS NOTES
HPI:   71 y.o.   OB History        2    Para   2    Term   2            AB        Living   1       SAB        IAB        Ectopic        Multiple        Live Births   1              No LMP recorded (lmp unknown). Patient is postmenopausal.    Patient is  here for her annual gynecologic exam.  She has no complaints.     ROS:  GENERAL: No fever, chills, fatigability or weight loss.  SKIN: No rashes, itching or changes in color or texture of skin.  HEAD: No headaches or recent head trauma.  EYES: Visual acuity fine. No photophobia, ocular pain or diplopia.  EARS: Denies ear pain, discharge or vertigo.  NOSE: No loss of smell, no epistaxis or postnasal drip.  MOUTH & THROAT: No hoarseness or change in voice. No excessive gum bleeding.  NODES: Denies swollen glands.  CHEST: Denies MANCERA, cyanosis, wheezing, cough and sputum production.  CARDIOVASCULAR: Denies chest pain, PND, orthopnea or reduced exercise tolerance.  ABDOMEN: Appetite fine. No weight loss. Denies diarrhea, abdominal pain, hematemesis or blood in stool.  URINARY: No flank pain, dysuria or hematuria.  PERIPHERAL VASCULAR: No claudication or cyanosis.  MUSCULOSKELETAL: No joint stiffness or swelling. Denies back pain.  NEUROLOGIC: No history of seizures, paralysis, alteration of gait or coordination.    PE:   BP (!) 156/84   Wt 55.9 kg (123 lb 3.8 oz)   LMP  (LMP Unknown)   BMI 21.83 kg/m²   APPEARANCE: Well nourished, well developed, in no acute distress.  NECK: Neck symmetric without masses or thyromegaly.  BREASTS: Symmetrical, no skin changes or visible lesions. No palpable masses, nipple discharge or adenopathy bilaterally.  ABDOMEN: Flat. Soft. No tenderness or masses. No hepatosplenomegaly. No hernias. No CVA tenderness.  VULVA: No lesions. Normal female genitalia.  URETHRAL MEATUS: Normal size and location, no lesions, no prolapse.  URETHRA: No masses, tenderness, prolapse or scarring.  VAGINA: Moist and well rugated, no discharge, no  significant cystocele or rectocele.  CERVIX: No lesions and discharge. PAP done.  UTERUS: Normal size, regular shape, mobile, non-tender, bladder base nontender.  ADNEXA: No masses, tenderness or CDS nodularity.  ANUS PERINEUM: Normal.    PROCEDURES:      Assessment:  Normal Gynecologic Exam    Plan:  Mammogram and Colonoscopy if indicated by current recommendations.  Return to clinic in one year or for any problems or complaints.  Some sharp discomfort occ with voding  Dicussed bone den  Just right at -2.5 on one view, sister had issues with meds  Will see dentist in oct and update me  Increase ca and vid d, may start meds, after

## 2022-07-02 LAB — BACTERIA UR CULT: NO GROWTH

## 2022-07-05 ENCOUNTER — TELEPHONE (OUTPATIENT)
Dept: OBSTETRICS AND GYNECOLOGY | Facility: CLINIC | Age: 72
End: 2022-07-05
Payer: MEDICARE

## 2022-07-05 ENCOUNTER — PATIENT MESSAGE (OUTPATIENT)
Dept: OBSTETRICS AND GYNECOLOGY | Facility: CLINIC | Age: 72
End: 2022-07-05
Payer: MEDICARE

## 2022-10-31 ENCOUNTER — PATIENT MESSAGE (OUTPATIENT)
Dept: CARDIOLOGY | Facility: CLINIC | Age: 72
End: 2022-10-31
Payer: MEDICARE

## 2022-12-19 ENCOUNTER — LAB VISIT (OUTPATIENT)
Dept: LAB | Facility: HOSPITAL | Age: 72
End: 2022-12-19
Attending: INTERNAL MEDICINE
Payer: MEDICARE

## 2022-12-19 ENCOUNTER — OFFICE VISIT (OUTPATIENT)
Dept: HEMATOLOGY/ONCOLOGY | Facility: CLINIC | Age: 72
End: 2022-12-19
Payer: MEDICARE

## 2022-12-19 VITALS
WEIGHT: 122.94 LBS | DIASTOLIC BLOOD PRESSURE: 80 MMHG | BODY MASS INDEX: 21.78 KG/M2 | SYSTOLIC BLOOD PRESSURE: 138 MMHG | OXYGEN SATURATION: 97 % | TEMPERATURE: 97 F | HEIGHT: 63 IN | HEART RATE: 67 BPM

## 2022-12-19 DIAGNOSIS — E87.5 HYPERKALEMIA: ICD-10-CM

## 2022-12-19 DIAGNOSIS — D75.89 MACROCYTOSIS: Primary | ICD-10-CM

## 2022-12-19 DIAGNOSIS — I47.10 SVT (SUPRAVENTRICULAR TACHYCARDIA): ICD-10-CM

## 2022-12-19 DIAGNOSIS — D75.89 MACROCYTOSIS: ICD-10-CM

## 2022-12-19 DIAGNOSIS — I10 ESSENTIAL HYPERTENSION: ICD-10-CM

## 2022-12-19 DIAGNOSIS — E78.5 HYPERLIPIDEMIA, UNSPECIFIED HYPERLIPIDEMIA TYPE: ICD-10-CM

## 2022-12-19 LAB
ALBUMIN SERPL BCP-MCNC: 3.9 G/DL (ref 3.5–5.2)
ALP SERPL-CCNC: 59 U/L (ref 55–135)
ALT SERPL W/O P-5'-P-CCNC: 18 U/L (ref 10–44)
ANION GAP SERPL CALC-SCNC: 8 MMOL/L (ref 8–16)
AST SERPL-CCNC: 21 U/L (ref 10–40)
BASOPHILS # BLD AUTO: 0.04 K/UL (ref 0–0.2)
BASOPHILS NFR BLD: 0.6 % (ref 0–1.9)
BILIRUB SERPL-MCNC: 0.5 MG/DL (ref 0.1–1)
BUN SERPL-MCNC: 14 MG/DL (ref 8–23)
CALCIUM SERPL-MCNC: 9.7 MG/DL (ref 8.7–10.5)
CHLORIDE SERPL-SCNC: 102 MMOL/L (ref 95–110)
CO2 SERPL-SCNC: 27 MMOL/L (ref 23–29)
CREAT SERPL-MCNC: 0.8 MG/DL (ref 0.5–1.4)
DIFFERENTIAL METHOD: ABNORMAL
EOSINOPHIL # BLD AUTO: 0.1 K/UL (ref 0–0.5)
EOSINOPHIL NFR BLD: 1.4 % (ref 0–8)
ERYTHROCYTE [DISTWIDTH] IN BLOOD BY AUTOMATED COUNT: 13.2 % (ref 11.5–14.5)
EST. GFR  (NO RACE VARIABLE): >60 ML/MIN/1.73 M^2
GLUCOSE SERPL-MCNC: 104 MG/DL (ref 70–110)
HCT VFR BLD AUTO: 37.2 % (ref 37–48.5)
HGB BLD-MCNC: 12.7 G/DL (ref 12–16)
IMM GRANULOCYTES # BLD AUTO: 0.02 K/UL (ref 0–0.04)
IMM GRANULOCYTES NFR BLD AUTO: 0.3 % (ref 0–0.5)
LDH SERPL L TO P-CCNC: 176 U/L (ref 110–260)
LYMPHOCYTES # BLD AUTO: 1.4 K/UL (ref 1–4.8)
LYMPHOCYTES NFR BLD: 22.4 % (ref 18–48)
MCH RBC QN AUTO: 34.2 PG (ref 27–31)
MCHC RBC AUTO-ENTMCNC: 34.1 G/DL (ref 32–36)
MCV RBC AUTO: 100 FL (ref 82–98)
MONOCYTES # BLD AUTO: 0.5 K/UL (ref 0.3–1)
MONOCYTES NFR BLD: 7.9 % (ref 4–15)
NEUTROPHILS # BLD AUTO: 4.3 K/UL (ref 1.8–7.7)
NEUTROPHILS NFR BLD: 67.4 % (ref 38–73)
NRBC BLD-RTO: 0 /100 WBC
PLATELET # BLD AUTO: 303 K/UL (ref 150–450)
PMV BLD AUTO: 9.4 FL (ref 9.2–12.9)
POTASSIUM SERPL-SCNC: 4.9 MMOL/L (ref 3.5–5.1)
PROT SERPL-MCNC: 6.9 G/DL (ref 6–8.4)
RBC # BLD AUTO: 3.71 M/UL (ref 4–5.4)
SODIUM SERPL-SCNC: 137 MMOL/L (ref 136–145)
URATE SERPL-MCNC: 3.8 MG/DL (ref 2.4–5.7)
WBC # BLD AUTO: 6.42 K/UL (ref 3.9–12.7)

## 2022-12-19 PROCEDURE — 85025 COMPLETE CBC W/AUTO DIFF WBC: CPT | Mod: PN | Performed by: INTERNAL MEDICINE

## 2022-12-19 PROCEDURE — 80053 COMPREHEN METABOLIC PANEL: CPT | Mod: PN | Performed by: INTERNAL MEDICINE

## 2022-12-19 PROCEDURE — 99213 OFFICE O/P EST LOW 20 MIN: CPT | Mod: PBBFAC,PN | Performed by: INTERNAL MEDICINE

## 2022-12-19 PROCEDURE — 83615 LACTATE (LD) (LDH) ENZYME: CPT | Mod: PN | Performed by: INTERNAL MEDICINE

## 2022-12-19 PROCEDURE — 99213 PR OFFICE/OUTPT VISIT, EST, LEVL III, 20-29 MIN: ICD-10-PCS | Mod: S$PBB,,, | Performed by: INTERNAL MEDICINE

## 2022-12-19 PROCEDURE — 99213 OFFICE O/P EST LOW 20 MIN: CPT | Mod: S$PBB,,, | Performed by: INTERNAL MEDICINE

## 2022-12-19 PROCEDURE — 36415 COLL VENOUS BLD VENIPUNCTURE: CPT | Mod: PN | Performed by: INTERNAL MEDICINE

## 2022-12-19 PROCEDURE — 99999 PR PBB SHADOW E&M-EST. PATIENT-LVL III: CPT | Mod: PBBFAC,,, | Performed by: INTERNAL MEDICINE

## 2022-12-19 PROCEDURE — 99999 PR PBB SHADOW E&M-EST. PATIENT-LVL III: ICD-10-PCS | Mod: PBBFAC,,, | Performed by: INTERNAL MEDICINE

## 2022-12-19 PROCEDURE — 84550 ASSAY OF BLOOD/URIC ACID: CPT | Mod: PN | Performed by: INTERNAL MEDICINE

## 2022-12-19 RX ORDER — GABAPENTIN 300 MG/1
300 CAPSULE ORAL NIGHTLY
COMMUNITY
Start: 2022-11-28

## 2022-12-19 RX ORDER — ATORVASTATIN CALCIUM 40 MG/1
40 TABLET, FILM COATED ORAL DAILY
COMMUNITY

## 2022-12-19 RX ORDER — CHOLECALCIFEROL (VITAMIN D3) 25 MCG
1000 TABLET ORAL DAILY
COMMUNITY

## 2022-12-19 RX ORDER — ASPIRIN 81 MG/1
81 TABLET ORAL DAILY
COMMUNITY

## 2022-12-19 NOTE — PROGRESS NOTES
PATIENT: Gricelda Hassan  MRN: [id number]  DATE: 12/19/2022      Diagnosis:   1. Macrocytosis    2. Hyperkalemia    3. Essential hypertension    4. Hyperlipidemia, unspecified hyperlipidemia type    5. SVT (supraventricular tachycardia)          Chief Complaint: Follow-up (Macrocytosis)    Subjective:     Interval History: Ms. Hassan is a 72 y.o. female with HTN, HLD, Atrial Tachycardia who presents for follow up for macrocytosis.  Lab work on 7/06/21 showed normal LDH, retic, LDH, MMA, homocysteine and TSH.  Since the last clinic visit the patient has felt well.  The patient denies CP, cough, SOB, abdominal pain, N/V, constipation, diarrhea.  The patient denies fever, chills, night sweats, weight loss, new lumps or bumps, easy bruising or bleeding.    Past Medical History:   Past Medical History:   Diagnosis Date    Atrial tachycardia     Hyperlipidemia     Hypertension     Ischemic colitis        Past Surgical HIstory:   Past Surgical History:   Procedure Laterality Date    TUBAL LIGATION         Family History:   Family History   Problem Relation Age of Onset    COPD Mother     Heart disease Father         CABG age 60    Hypertension Brother     Neuropathy Brother     Hypertension Sister     No Known Problems Daughter     No Known Problems Daughter        Social History:  reports that she quit smoking about 37 years ago. She has a 8.00 pack-year smoking history. She has never used smokeless tobacco. She reports current alcohol use of about 1.0 standard drink per week. She reports that she does not use drugs.    Allergies:  Review of patient's allergies indicates:   Allergen Reactions    Adhesive tape-silicones Other (See Comments)    Penicillins Rash     Yeast infection         Medications:  Current Outpatient Medications   Medication Sig Dispense Refill    aspirin (ECOTRIN) 81 MG EC tablet Take 81 mg by mouth once daily.      atorvastatin (LIPITOR) 40 MG tablet Take 40 mg by mouth once daily.      calcium  "carbonate (OS-PAULINE) 500 mg calcium (1,250 mg) tablet Take 1 tablet by mouth once daily.      cyanocobalamin 500 MCG tablet Vitamin B-12 500 mcg tablet   1 tablet; 500 MCG; Once a day      gabapentin (NEURONTIN) 300 MG capsule Take 300 mg by mouth every evening.      GARLIC ORAL Take by mouth.      multivitamin capsule Take 1 capsule by mouth once daily.      quinapril (ACCUPRIL) 20 MG tablet Take by mouth. 1 Tablet Oral Every day      vitamin D (VITAMIN D3) 1000 units Tab Take 1,000 Units by mouth once daily.       No current facility-administered medications for this visit.     Facility-Administered Medications Ordered in Other Visits   Medication Dose Route Frequency Provider Last Rate Last Admin    sodium chloride 0.9% bolus 1,000 mL  1,000 mL Intravenous Once Idalia Aguilera NP           Review of Systems   Constitutional:  Negative for appetite change, chills, fatigue, fever and unexpected weight change.   HENT:  Negative for mouth sores.    Eyes:  Negative for visual disturbance.   Respiratory:  Negative for cough and shortness of breath.    Cardiovascular:  Negative for chest pain and palpitations.   Gastrointestinal:  Negative for abdominal pain, constipation, diarrhea, nausea and vomiting.   Genitourinary:  Negative for frequency.   Musculoskeletal:  Positive for back pain.   Skin:  Negative for rash.   Neurological:  Negative for headaches.   Hematological:  Negative for adenopathy. Does not bruise/bleed easily.   Psychiatric/Behavioral:  The patient is not nervous/anxious.      ECOG Performance Status: 0   Objective:      Vitals:   Vitals:    12/19/22 1125   BP: 138/80   BP Location: Right arm   Patient Position: Sitting   BP Method: Medium (Manual)   Pulse: 67   Temp: 96.7 °F (35.9 °C)   TempSrc: Temporal   SpO2: 97%   Weight: 55.7 kg (122 lb 14.5 oz)   Height: 5' 3" (1.6 m)       Physical Exam  Constitutional:       General: She is not in acute distress.     Appearance: She is well-developed. She is not " diaphoretic.   HENT:      Head: Normocephalic and atraumatic.   Cardiovascular:      Rate and Rhythm: Normal rate and regular rhythm.      Heart sounds: Normal heart sounds. No murmur heard.    No friction rub. No gallop.   Pulmonary:      Effort: Pulmonary effort is normal. No respiratory distress.      Breath sounds: Normal breath sounds. No wheezing or rales.   Chest:      Chest wall: No tenderness.   Abdominal:      General: Bowel sounds are normal. There is no distension.      Palpations: Abdomen is soft. There is no mass.      Tenderness: There is no abdominal tenderness. There is no guarding or rebound.   Lymphadenopathy:      Cervical: No cervical adenopathy.      Upper Body:      Right upper body: No supraclavicular or axillary adenopathy.      Left upper body: No supraclavicular or axillary adenopathy.   Skin:     Findings: No erythema or rash.   Neurological:      Mental Status: She is alert and oriented to person, place, and time.   Psychiatric:         Behavior: Behavior normal.       Laboratory Data:  Lab Visit on 12/19/2022   Component Date Value Ref Range Status    WBC 12/19/2022 6.42  3.90 - 12.70 K/uL Final    RBC 12/19/2022 3.71 (L)  4.00 - 5.40 M/uL Final    Hemoglobin 12/19/2022 12.7  12.0 - 16.0 g/dL Final    Hematocrit 12/19/2022 37.2  37.0 - 48.5 % Final    MCV 12/19/2022 100 (H)  82 - 98 fL Final    MCH 12/19/2022 34.2 (H)  27.0 - 31.0 pg Final    MCHC 12/19/2022 34.1  32.0 - 36.0 g/dL Final    RDW 12/19/2022 13.2  11.5 - 14.5 % Final    Platelets 12/19/2022 303  150 - 450 K/uL Final    MPV 12/19/2022 9.4  9.2 - 12.9 fL Final    Immature Granulocytes 12/19/2022 0.3  0.0 - 0.5 % Final    Gran # (ANC) 12/19/2022 4.3  1.8 - 7.7 K/uL Final    Immature Grans (Abs) 12/19/2022 0.02  0.00 - 0.04 K/uL Final    Comment: Mild elevation in immature granulocytes is non specific and   can be seen in a variety of conditions including stress response,   acute inflammation, trauma and pregnancy. Correlation  with other   laboratory and clinical findings is essential.      Lymph # 12/19/2022 1.4  1.0 - 4.8 K/uL Final    Mono # 12/19/2022 0.5  0.3 - 1.0 K/uL Final    Eos # 12/19/2022 0.1  0.0 - 0.5 K/uL Final    Baso # 12/19/2022 0.04  0.00 - 0.20 K/uL Final    nRBC 12/19/2022 0  0 /100 WBC Final    Gran % 12/19/2022 67.4  38.0 - 73.0 % Final    Lymph % 12/19/2022 22.4  18.0 - 48.0 % Final    Mono % 12/19/2022 7.9  4.0 - 15.0 % Final    Eosinophil % 12/19/2022 1.4  0.0 - 8.0 % Final    Basophil % 12/19/2022 0.6  0.0 - 1.9 % Final    Differential Method 12/19/2022 Automated   Final    Sodium 12/19/2022 137  136 - 145 mmol/L Final    Potassium 12/19/2022 4.9  3.5 - 5.1 mmol/L Final    Chloride 12/19/2022 102  95 - 110 mmol/L Final    CO2 12/19/2022 27  23 - 29 mmol/L Final    Glucose 12/19/2022 104  70 - 110 mg/dL Final    BUN 12/19/2022 14  8 - 23 mg/dL Final    Creatinine 12/19/2022 0.8  0.5 - 1.4 mg/dL Final    Calcium 12/19/2022 9.7  8.7 - 10.5 mg/dL Final    Total Protein 12/19/2022 6.9  6.0 - 8.4 g/dL Final    Albumin 12/19/2022 3.9  3.5 - 5.2 g/dL Final    Total Bilirubin 12/19/2022 0.5  0.1 - 1.0 mg/dL Final    Comment: For infants and newborns, interpretation of results should be based  on gestational age, weight and in agreement with clinical  observations.    Premature Infant recommended reference ranges:  Up to 24 hours.............<8.0 mg/dL  Up to 48 hours............<12.0 mg/dL  3-5 days..................<15.0 mg/dL  6-29 days.................<15.0 mg/dL      Alkaline Phosphatase 12/19/2022 59  55 - 135 U/L Final    AST 12/19/2022 21  10 - 40 U/L Final    ALT 12/19/2022 18  10 - 44 U/L Final    Anion Gap 12/19/2022 8  8 - 16 mmol/L Final    eGFR 12/19/2022 >60.0  >60 mL/min/1.73 m^2 Final    LD 12/19/2022 176  110 - 260 U/L Final    Results are increased in hemolyzed samples.    Uric Acid 12/19/2022 3.8  2.4 - 5.7 mg/dL Final         Imaging: Reviewed    Assessment:       1. Macrocytosis    2. Hyperkalemia     3. Essential hypertension    4. Hyperlipidemia, unspecified hyperlipidemia type    5. SVT (supraventricular tachycardia)             Plan:     Macrocytosis - The patient has a long standing macrocytosis  -Laboratory assessment on 7/06/21 showed a normal MMA and homocysteine level ruling our B12 and folic acid deficiency  -Retic, LDH and haptoglobin were normal ruling out hemolysis  -TSH was normal ruling out hypothyroidism  -The peripheral smear was non informative  -The patient likely has macrocytosis from age  -Blood counts on 12/19/22 were normal  -Will defer further monitoring to PCP given stable counts    Hyperkalemia - improved to 4.9 on 12/19/22  -Could be due to ACEi  -Pt with normal renal function  PCP managing    HTN - pt on quinapril  -Stable  -PCP managing    HDL - pt on crestor  -Management per cardiology    SVT - pt s/p ablation on 2/07/22  -Management per cardiology    Route Chart for Scheduling    Med Onc Chart Routing      Follow up with physician No follow up needed.   Follow up with XU    Infusion scheduling note    Injection scheduling note    Labs    Imaging    Pharmacy appointment    Other referrals             Edy Dejesus MD  [facility]  Hematology and Oncology  [facility]   [address] [place] [address]   O: [phone]  F: [phone]

## 2023-01-08 ENCOUNTER — PATIENT MESSAGE (OUTPATIENT)
Dept: OBSTETRICS AND GYNECOLOGY | Facility: CLINIC | Age: 73
End: 2023-01-08
Payer: MEDICARE

## 2023-01-10 ENCOUNTER — OFFICE VISIT (OUTPATIENT)
Dept: ORTHOPEDICS | Facility: CLINIC | Age: 73
End: 2023-01-10
Payer: MEDICARE

## 2023-01-10 ENCOUNTER — TELEPHONE (OUTPATIENT)
Dept: ORTHOPEDICS | Facility: CLINIC | Age: 73
End: 2023-01-10
Payer: MEDICARE

## 2023-01-10 ENCOUNTER — HOSPITAL ENCOUNTER (OUTPATIENT)
Dept: RADIOLOGY | Facility: HOSPITAL | Age: 73
Discharge: HOME | End: 2023-01-10
Attending: ORTHOPAEDIC SURGERY
Payer: MEDICARE

## 2023-01-10 VITALS — BODY MASS INDEX: 21.62 KG/M2 | HEIGHT: 63 IN | WEIGHT: 122 LBS

## 2023-01-10 DIAGNOSIS — M20.42 HAMMER TOE OF SECOND TOE OF LEFT FOOT: ICD-10-CM

## 2023-01-10 DIAGNOSIS — Z01.818 PRE-OP TESTING: Primary | ICD-10-CM

## 2023-01-10 DIAGNOSIS — M25.572 LEFT ANKLE PAIN: Primary | ICD-10-CM

## 2023-01-10 DIAGNOSIS — Q66.212 ACQUIRED HALLUX VALGUS WITH METATARSUS PRIMUS VARUS OF LEFT FOOT: Primary | ICD-10-CM

## 2023-01-10 DIAGNOSIS — M79.672 LEFT FOOT PAIN: ICD-10-CM

## 2023-01-10 DIAGNOSIS — M89.9 BONE DISORDER: ICD-10-CM

## 2023-01-10 DIAGNOSIS — E78.2 MODERATE MIXED HYPERLIPIDEMIA NOT REQUIRING STATIN THERAPY: ICD-10-CM

## 2023-01-10 DIAGNOSIS — E78.5 HYPERLIPIDEMIA, UNSPECIFIED HYPERLIPIDEMIA TYPE: ICD-10-CM

## 2023-01-10 DIAGNOSIS — M20.12 ACQUIRED HALLUX VALGUS, LEFT: ICD-10-CM

## 2023-01-10 DIAGNOSIS — S93.129A CLOSED DISLOCATION OF METATARSOPHALANGEAL (JOINT), INITIAL ENCOUNTER: ICD-10-CM

## 2023-01-10 DIAGNOSIS — M77.42 METATARSALGIA OF LEFT FOOT: ICD-10-CM

## 2023-01-10 PROCEDURE — 73630 X-RAY EXAM OF FOOT: CPT | Mod: 26,LT,, | Performed by: RADIOLOGY

## 2023-01-10 PROCEDURE — 73610 XR ANKLE COMPLETE 3 VIEW LEFT: ICD-10-PCS | Mod: 26,LT,, | Performed by: RADIOLOGY

## 2023-01-10 PROCEDURE — 99204 PR OFFICE/OUTPT VISIT, NEW, LEVL IV, 45-59 MIN: ICD-10-PCS | Mod: S$PBB,,, | Performed by: ORTHOPAEDIC SURGERY

## 2023-01-10 PROCEDURE — 73610 X-RAY EXAM OF ANKLE: CPT | Mod: TC,PO,LT

## 2023-01-10 PROCEDURE — 73610 X-RAY EXAM OF ANKLE: CPT | Mod: 26,LT,, | Performed by: RADIOLOGY

## 2023-01-10 PROCEDURE — 73630 XR FOOT COMPLETE 3 VIEW LEFT: ICD-10-PCS | Mod: 26,LT,, | Performed by: RADIOLOGY

## 2023-01-10 PROCEDURE — 99999 PR PBB SHADOW E&M-EST. PATIENT-LVL III: CPT | Mod: PBBFAC,,, | Performed by: ORTHOPAEDIC SURGERY

## 2023-01-10 PROCEDURE — 99204 OFFICE O/P NEW MOD 45 MIN: CPT | Mod: S$PBB,,, | Performed by: ORTHOPAEDIC SURGERY

## 2023-01-10 PROCEDURE — 99213 OFFICE O/P EST LOW 20 MIN: CPT | Mod: PBBFAC,PN | Performed by: ORTHOPAEDIC SURGERY

## 2023-01-10 PROCEDURE — 73630 X-RAY EXAM OF FOOT: CPT | Mod: TC,PO,LT

## 2023-01-10 PROCEDURE — 99999 PR PBB SHADOW E&M-EST. PATIENT-LVL III: ICD-10-PCS | Mod: PBBFAC,,, | Performed by: ORTHOPAEDIC SURGERY

## 2023-01-10 NOTE — TELEPHONE ENCOUNTER
Called and left message for patient.  She called and had questions.  Patient has not seen Dr. Hammonds before and does not have an appointment.

## 2023-01-10 NOTE — TELEPHONE ENCOUNTER
----- Message from Erin Lee, Patient Care Assistant sent at 1/10/2023  9:43 AM CST -----  Contact: self  Pt is calling to get a call back from a nurse. She has some questions. Please heena back to advise [phone]  thanks

## 2023-01-10 NOTE — H&P (VIEW-ONLY)
Status/Diagnosis: Left hallux valgus with crossover toe deformity; 2nd MTP dorsal dislocation; 2nd metatarsalgia; rigid 2nd hammertoe  Date of Surgery: none  Date of Injury: none  Return visit: 2 weeks postop  X-rays on Return: [facility] 3-views Left foot XOP    Chief Complaint:   Chief Complaint   Patient presents with    Left Foot - Pain, Swelling     Present History:  Gricelda Hassan is a 72 y.o. female who presents today for new patient evaluation.  Endorses a longstanding history of left forefoot pain with worsening pain, deformity, and difficulty exercising the last 4+ months.  Specifically endorses worsening crossover toe deformity, 2nd over 1st.  Denies any history of injury.  Hallux valgus present since around age 21.  Denies any numbness or tingling.  Patient remains extremely active however this is affecting her ability to exercise.  She has tried shoe wear and activity modification, oral NSAIDs, etc. with minimal symptomatic relief.  Past medical history as outlined below.  Denies tobacco use.      Past Medical History:   Diagnosis Date    Atrial tachycardia     Hyperlipidemia     Hypertension     Ischemic colitis        Past Surgical History:   Procedure Laterality Date    TUBAL LIGATION         Current Outpatient Medications   Medication Sig    aspirin (ECOTRIN) 81 MG EC tablet Take 81 mg by mouth once daily.    atorvastatin (LIPITOR) 40 MG tablet Take 40 mg by mouth once daily.    calcium carbonate (OS-PAULINE) 500 mg calcium (1,250 mg) tablet Take 1 tablet by mouth once daily.    cyanocobalamin 500 MCG tablet Vitamin B-12 500 mcg tablet   1 tablet; 500 MCG; Once a day    gabapentin (NEURONTIN) 300 MG capsule Take 300 mg by mouth every evening.    GARLIC ORAL Take by mouth.    multivitamin capsule Take 1 capsule by mouth once daily.    quinapril (ACCUPRIL) 20 MG tablet Take by mouth. 1 Tablet Oral Every day    vitamin D (VITAMIN D3) 1000 units Tab Take 1,000 Units by mouth once daily.     No current  facility-administered medications for this visit.     Facility-Administered Medications Ordered in Other Visits   Medication    sodium chloride 0.9% bolus 1,000 mL       Review of patient's allergies indicates:   Allergen Reactions    Adhesive tape-silicones Other (See Comments)    Penicillins Rash     Yeast infection         Family History   Problem Relation Age of Onset    COPD Mother     Heart disease Father         CABG age 60    Hypertension Brother     Neuropathy Brother     Hypertension Sister     No Known Problems Daughter     No Known Problems Daughter        Social History     Socioeconomic History    Marital status:    Occupational History     Employer: [email or web address]   Tobacco Use    Smoking status: Former     Packs/day: 0.50     Years: 16.00     Pack years: 8.00     Types: Cigarettes     Quit date: 1985     Years since quittin.0    Smokeless tobacco: Never   Substance and Sexual Activity    Alcohol use: Yes     Alcohol/week: 1.0 standard drink     Types: 1 Glasses of wine per week     Comment: on occasion    Drug use: No    Sexual activity: Yes     Partners: Male     Comment:        Physical exam:  There were no vitals filed for this visit.  Body mass index is 21.61 kg/m².  General: In no apparent distress; well developed and well nourished.  HEENT: normocephalic; atraumatic.  Cardiovascular: regular rate.  Respiratory: no increased work of breathing.  Musculoskeletal:   Gait: mild antalgic  Inspection:  Severe hallux valgus with 2nd over 1st crossover toe deformity.  Dorsal dislocation of the 2nd MTP joint that is passively reducible.  Moderate tenderness about the plantar aspect of the forefoot at the 2nd metatarsal head.  Rigid 2nd hammertoe deformity.  Skin irritation present both at the prominent medial eminence of the 1st metatarsal head and the dorsal aspect of the 2nd toe PIP joint.  Near complete correction of the 1st MTP hallux valgus passively.  Mild flatfoot deformity.   Passively correct hindfoot to neutral.  Good single limb heel rise bilaterally.  Pain at the 2nd metatarsal head with this.  No pain referable to the ankle.  No laxity with anterior drawer testing.  Silfverskiold: negative  Alignment:  Knee: neutral               Ankle: neutral              Hindfoot: neutral              Forefoot: neutral   Strength:              Dorsiflexion 5/5  Plantar flexion 5/5  Inversion 5/5   Eversion 5/5   Sensation:              SILT distally   ROM:              Ankle: Full and painless.              Subtalar: Painless inversion and eversion   Pulses: 2+ DP/PT pulses.                   Imaging Studies/Outside documentation:  I have ordered/reviewed/interpreted the following images/outside documentation:  1. Weight bearing 3-views of Left foot and ankle:  No acute bony abnormality noted.  Severe hallux valgus with 34 degree hallux valgus angle and 18 degree intermetatarsal angle.  Plantar translation of the 1st metatarsal base relative to the medial cuneiform.  Dorsal subluxation of the 2nd MTP joint with concomitant hammertoe deformity.  Diffuse osteopenic changes throughout the foot and ankle.  Congruent ankle mortise.        Assessment:  Gricelda Hassan is a 72 y.o. female with Left hallux valgus with crossover toe deformity; 2nd MTP dorsal dislocation; 2nd metatarsalgia; rigid 2nd hammertoe.     Plan:   Clinical and radiographic findings were discussed. Operative vs nonoperative treatment options were described. These include but are not limited to bleeding; infection; damage to surrounding nerves or vessels; persistent pain, stiffness; nonunion; malunion; recurrent deformity; need for additional procedures; amputation; blood clots; pulmonary embolus; cardiac events; stroke; and the general risks of anesthesia including anesthetic death.   We also reviewed postop protocol as well as limitations and expectations. Patient's symptoms have persisted despite conservative management to  include but not limited to shoe wear and activity modifications; anti-inflammatories; immobilization; and continue to affect the patient's quality of life. Patient understands and desires to proceed with surgical intervention.   Explained risks, benefits, and alternative to the patient. Asked if any questions--none.  Surgery to include but not limited to:   Left hallux valgus correction; 2nd metatarsophalangeal joint open reduction; 2nd hammertoe correction; 2nd metatarsal head plantar condylectomy; surgery as indicated.      This note was created using voice recognition software and may contain grammatical errors.

## 2023-01-10 NOTE — PROGRESS NOTES
Status/Diagnosis: Left hallux valgus with crossover toe deformity; 2nd MTP dorsal dislocation; 2nd metatarsalgia; rigid 2nd hammertoe  Date of Surgery: none  Date of Injury: none  Return visit: 2 weeks postop  X-rays on Return: [facility] 3-views Left foot XOP    Chief Complaint:   Chief Complaint   Patient presents with    Left Foot - Pain, Swelling     Present History:  Gricelda Hassan is a 72 y.o. female who presents today for new patient evaluation.  Endorses a longstanding history of left forefoot pain with worsening pain, deformity, and difficulty exercising the last 4+ months.  Specifically endorses worsening crossover toe deformity, 2nd over 1st.  Denies any history of injury.  Hallux valgus present since around age 21.  Denies any numbness or tingling.  Patient remains extremely active however this is affecting her ability to exercise.  She has tried shoe wear and activity modification, oral NSAIDs, etc. with minimal symptomatic relief.  Past medical history as outlined below.  Denies tobacco use.      Past Medical History:   Diagnosis Date    Atrial tachycardia     Hyperlipidemia     Hypertension     Ischemic colitis        Past Surgical History:   Procedure Laterality Date    TUBAL LIGATION         Current Outpatient Medications   Medication Sig    aspirin (ECOTRIN) 81 MG EC tablet Take 81 mg by mouth once daily.    atorvastatin (LIPITOR) 40 MG tablet Take 40 mg by mouth once daily.    calcium carbonate (OS-PAULINE) 500 mg calcium (1,250 mg) tablet Take 1 tablet by mouth once daily.    cyanocobalamin 500 MCG tablet Vitamin B-12 500 mcg tablet   1 tablet; 500 MCG; Once a day    gabapentin (NEURONTIN) 300 MG capsule Take 300 mg by mouth every evening.    GARLIC ORAL Take by mouth.    multivitamin capsule Take 1 capsule by mouth once daily.    quinapril (ACCUPRIL) 20 MG tablet Take by mouth. 1 Tablet Oral Every day    vitamin D (VITAMIN D3) 1000 units Tab Take 1,000 Units by mouth once daily.     No current  facility-administered medications for this visit.     Facility-Administered Medications Ordered in Other Visits   Medication    sodium chloride 0.9% bolus 1,000 mL       Review of patient's allergies indicates:   Allergen Reactions    Adhesive tape-silicones Other (See Comments)    Penicillins Rash     Yeast infection         Family History   Problem Relation Age of Onset    COPD Mother     Heart disease Father         CABG age 60    Hypertension Brother     Neuropathy Brother     Hypertension Sister     No Known Problems Daughter     No Known Problems Daughter        Social History     Socioeconomic History    Marital status:    Occupational History     Employer: [employer]   Tobacco Use    Smoking status: Former     Packs/day: 0.50     Years: 16.00     Pack years: 8.00     Types: Cigarettes     Quit date: 1985     Years since quittin.0    Smokeless tobacco: Never   Substance and Sexual Activity    Alcohol use: Yes     Alcohol/week: 1.0 standard drink     Types: 1 Glasses of wine per week     Comment: on occasion    Drug use: No    Sexual activity: Yes     Partners: Male     Comment:        Physical exam:  There were no vitals filed for this visit.  Body mass index is 21.61 kg/m².  General: In no apparent distress; well developed and well nourished.  HEENT: normocephalic; atraumatic.  Cardiovascular: regular rate.  Respiratory: no increased work of breathing.  Musculoskeletal:   Gait: mild antalgic  Inspection:  Severe hallux valgus with 2nd over 1st crossover toe deformity.  Dorsal dislocation of the 2nd MTP joint that is passively reducible.  Moderate tenderness about the plantar aspect of the forefoot at the 2nd metatarsal head.  Rigid 2nd hammertoe deformity.  Skin irritation present both at the prominent medial eminence of the 1st metatarsal head and the dorsal aspect of the 2nd toe PIP joint.  Near complete correction of the 1st MTP hallux valgus passively.  Mild flatfoot deformity.   Passively correct hindfoot to neutral.  Good single limb heel rise bilaterally.  Pain at the 2nd metatarsal head with this.  No pain referable to the ankle.  No laxity with anterior drawer testing.  Silfverskiold: negative  Alignment:  Knee: neutral               Ankle: neutral              Hindfoot: neutral              Forefoot: neutral   Strength:              Dorsiflexion 5/5  Plantar flexion 5/5  Inversion 5/5   Eversion 5/5   Sensation:              SILT distally   ROM:              Ankle: Full and painless.              Subtalar: Painless inversion and eversion   Pulses: 2+ DP/PT pulses.                   Imaging Studies/Outside documentation:  I have ordered/reviewed/interpreted the following images/outside documentation:  1. Weight bearing 3-views of Left foot and ankle:  No acute bony abnormality noted.  Severe hallux valgus with 34 degree hallux valgus angle and 18 degree intermetatarsal angle.  Plantar translation of the 1st metatarsal base relative to the medial cuneiform.  Dorsal subluxation of the 2nd MTP joint with concomitant hammertoe deformity.  Diffuse osteopenic changes throughout the foot and ankle.  Congruent ankle mortise.        Assessment:  Gricelda Hassan is a 72 y.o. female with Left hallux valgus with crossover toe deformity; 2nd MTP dorsal dislocation; 2nd metatarsalgia; rigid 2nd hammertoe.     Plan:   Clinical and radiographic findings were discussed. Operative vs nonoperative treatment options were described. These include but are not limited to bleeding; infection; damage to surrounding nerves or vessels; persistent pain, stiffness; nonunion; malunion; recurrent deformity; need for additional procedures; amputation; blood clots; pulmonary embolus; cardiac events; stroke; and the general risks of anesthesia including anesthetic death.   We also reviewed postop protocol as well as limitations and expectations. Patient's symptoms have persisted despite conservative management to  include but not limited to shoe wear and activity modifications; anti-inflammatories; immobilization; and continue to affect the patient's quality of life. Patient understands and desires to proceed with surgical intervention.   Explained risks, benefits, and alternative to the patient. Asked if any questions--none.  Surgery to include but not limited to:   Left hallux valgus correction; 2nd metatarsophalangeal joint open reduction; 2nd hammertoe correction; 2nd metatarsal head plantar condylectomy; surgery as indicated.      This note was created using voice recognition software and may contain grammatical errors.

## 2023-01-12 ENCOUNTER — PATIENT MESSAGE (OUTPATIENT)
Dept: ORTHOPEDICS | Facility: CLINIC | Age: 73
End: 2023-01-12
Payer: MEDICARE

## 2023-01-12 DIAGNOSIS — M20.42 HAMMERTOE OF SECOND TOE OF LEFT FOOT: ICD-10-CM

## 2023-01-12 DIAGNOSIS — M21.612 HALLUX VALGUS WITH BUNIONS OF LEFT FOOT: Primary | ICD-10-CM

## 2023-01-12 DIAGNOSIS — M20.12 HALLUX VALGUS WITH BUNIONS OF LEFT FOOT: Primary | ICD-10-CM

## 2023-01-13 RX ORDER — MUPIROCIN 20 MG/G
OINTMENT TOPICAL
Status: CANCELLED | OUTPATIENT
Start: 2023-01-13

## 2023-01-24 ENCOUNTER — PATIENT MESSAGE (OUTPATIENT)
Dept: SURGERY | Facility: HOSPITAL | Age: 73
End: 2023-01-24
Payer: MEDICARE

## 2023-01-27 DIAGNOSIS — Z95.818 STATUS POST PLACEMENT OF IMPLANTABLE LOOP RECORDER: Primary | ICD-10-CM

## 2023-01-27 DIAGNOSIS — I47.19 ATRIAL TACHYCARDIA: ICD-10-CM

## 2023-01-31 ENCOUNTER — ANESTHESIA EVENT (OUTPATIENT)
Dept: SURGERY | Facility: HOSPITAL | Age: 73
End: 2023-01-31
Payer: MEDICARE

## 2023-02-01 ENCOUNTER — ANESTHESIA (OUTPATIENT)
Dept: SURGERY | Facility: HOSPITAL | Age: 73
End: 2023-02-01
Payer: MEDICARE

## 2023-02-01 ENCOUNTER — HOSPITAL ENCOUNTER (OUTPATIENT)
Facility: HOSPITAL | Age: 73
Discharge: HOME | End: 2023-02-01
Attending: ORTHOPAEDIC SURGERY | Admitting: ORTHOPAEDIC SURGERY
Payer: MEDICARE

## 2023-02-01 ENCOUNTER — HOSPITAL ENCOUNTER (OUTPATIENT)
Dept: RADIOLOGY | Facility: HOSPITAL | Age: 73
Discharge: HOME | End: 2023-02-01
Attending: ORTHOPAEDIC SURGERY | Admitting: ORTHOPAEDIC SURGERY
Payer: MEDICARE

## 2023-02-01 DIAGNOSIS — M20.12 ACQUIRED HALLUX VALGUS, LEFT: ICD-10-CM

## 2023-02-01 DIAGNOSIS — M79.675 PAIN IN LEFT TOE(S): ICD-10-CM

## 2023-02-01 DIAGNOSIS — Q66.212 ACQUIRED HALLUX VALGUS WITH METATARSUS PRIMUS VARUS OF LEFT FOOT: ICD-10-CM

## 2023-02-01 PROCEDURE — 36000708 HC OR TIME LEV III 1ST 15 MIN: Mod: PO | Performed by: ORTHOPAEDIC SURGERY

## 2023-02-01 PROCEDURE — 37000008 HC ANESTHESIA 1ST 15 MINUTES: Mod: PO | Performed by: ORTHOPAEDIC SURGERY

## 2023-02-01 PROCEDURE — 28297 PR CORRECT BUNION, ANY METHOD: ICD-10-PCS | Mod: TA,,, | Performed by: ORTHOPAEDIC SURGERY

## 2023-02-01 PROCEDURE — 63600175 PHARM REV CODE 636 W HCPCS: Mod: PO | Performed by: ANESTHESIOLOGY

## 2023-02-01 PROCEDURE — 63600175 PHARM REV CODE 636 W HCPCS: Mod: PO | Performed by: NURSE ANESTHETIST, CERTIFIED REGISTERED

## 2023-02-01 PROCEDURE — 28288 PR PART REMV BONE METATARSAL HEAD,EA: ICD-10-PCS | Mod: 59,51,T2, | Performed by: ORTHOPAEDIC SURGERY

## 2023-02-01 PROCEDURE — D9220A PRA ANESTHESIA: Mod: ANES,,, | Performed by: ANESTHESIOLOGY

## 2023-02-01 PROCEDURE — C1713 ANCHOR/SCREW BN/BN,TIS/BN: HCPCS | Mod: PO | Performed by: ORTHOPAEDIC SURGERY

## 2023-02-01 PROCEDURE — 25000003 PHARM REV CODE 250: Mod: PO | Performed by: ORTHOPAEDIC SURGERY

## 2023-02-01 PROCEDURE — 25000003 PHARM REV CODE 250: Mod: PO | Performed by: ANESTHESIOLOGY

## 2023-02-01 PROCEDURE — 71000015 HC POSTOP RECOV 1ST HR: Mod: PO | Performed by: ORTHOPAEDIC SURGERY

## 2023-02-01 PROCEDURE — 64450 PERIPHERAL BLOCK: ICD-10-PCS | Mod: 59,LT,, | Performed by: ANESTHESIOLOGY

## 2023-02-01 PROCEDURE — 63600175 PHARM REV CODE 636 W HCPCS: Mod: PO | Performed by: ORTHOPAEDIC SURGERY

## 2023-02-01 PROCEDURE — C1734 ORTH/DEVIC/DRUG BN/BN,TIS/BN: HCPCS | Mod: PO | Performed by: ORTHOPAEDIC SURGERY

## 2023-02-01 PROCEDURE — 76942 PERIPHERAL BLOCK: ICD-10-PCS | Mod: 26,,, | Performed by: ANESTHESIOLOGY

## 2023-02-01 PROCEDURE — 25000003 PHARM REV CODE 250: Mod: PO | Performed by: NURSE ANESTHETIST, CERTIFIED REGISTERED

## 2023-02-01 PROCEDURE — 28645 PR OPEN TX METATARSOPHALANGEAL JOINT DISLOCATION: ICD-10-PCS | Mod: 59,51,T2, | Performed by: ORTHOPAEDIC SURGERY

## 2023-02-01 PROCEDURE — D9220A PRA ANESTHESIA: Mod: CRNA,,, | Performed by: NURSE ANESTHETIST, CERTIFIED REGISTERED

## 2023-02-01 PROCEDURE — 28285 PR REPAIR OF HAMMERTOE,ONE: ICD-10-PCS | Mod: 59,51,T2, | Performed by: ORTHOPAEDIC SURGERY

## 2023-02-01 PROCEDURE — 28285 REPAIR OF HAMMERTOE: CPT | Mod: 59,51,T2, | Performed by: ORTHOPAEDIC SURGERY

## 2023-02-01 PROCEDURE — 71000033 HC RECOVERY, INTIAL HOUR: Mod: PO | Performed by: ORTHOPAEDIC SURGERY

## 2023-02-01 PROCEDURE — 37000009 HC ANESTHESIA EA ADD 15 MINS: Mod: PO | Performed by: ORTHOPAEDIC SURGERY

## 2023-02-01 PROCEDURE — 76000 FLUOROSCOPY <1 HR PHYS/QHP: CPT | Mod: TC,PO

## 2023-02-01 PROCEDURE — 28297 COR HLX VLGS JT ARTHRD: CPT | Mod: TA,,, | Performed by: ORTHOPAEDIC SURGERY

## 2023-02-01 PROCEDURE — D9220A PRA ANESTHESIA: ICD-10-PCS | Mod: CRNA,,, | Performed by: NURSE ANESTHETIST, CERTIFIED REGISTERED

## 2023-02-01 PROCEDURE — 36000709 HC OR TIME LEV III EA ADD 15 MIN: Mod: PO | Performed by: ORTHOPAEDIC SURGERY

## 2023-02-01 PROCEDURE — C9290 INJ, BUPIVACAINE LIPOSOME: HCPCS | Mod: PO | Performed by: ANESTHESIOLOGY

## 2023-02-01 PROCEDURE — 28645 REPAIR TOE DISLOCATION: CPT | Mod: 59,51,T2, | Performed by: ORTHOPAEDIC SURGERY

## 2023-02-01 PROCEDURE — 27201423 OPTIME MED/SURG SUP & DEVICES STERILE SUPPLY: Mod: PO | Performed by: ORTHOPAEDIC SURGERY

## 2023-02-01 PROCEDURE — D9220A PRA ANESTHESIA: ICD-10-PCS | Mod: ANES,,, | Performed by: ANESTHESIOLOGY

## 2023-02-01 PROCEDURE — 64450 NJX AA&/STRD OTHER PN/BRANCH: CPT | Mod: 59,LT,, | Performed by: ANESTHESIOLOGY

## 2023-02-01 PROCEDURE — 76942 ECHO GUIDE FOR BIOPSY: CPT | Mod: PO | Performed by: ANESTHESIOLOGY

## 2023-02-01 PROCEDURE — C1769 GUIDE WIRE: HCPCS | Mod: PO | Performed by: ORTHOPAEDIC SURGERY

## 2023-02-01 PROCEDURE — 28288 PARTIAL REMOVAL OF FOOT BONE: CPT | Mod: 59,51,T2, | Performed by: ORTHOPAEDIC SURGERY

## 2023-02-01 DEVICE — K-WIRE DUAL TROC 1.25X150MM SS: Type: IMPLANTABLE DEVICE | Site: FOOT | Status: FUNCTIONAL

## 2023-02-01 DEVICE — MATRIX BONE BIO4 VIABLE 1CC: Type: IMPLANTABLE DEVICE | Site: FOOT | Status: FUNCTIONAL

## 2023-02-01 DEVICE — IMPLANTABLE DEVICE: Type: IMPLANTABLE DEVICE | Site: FOOT | Status: FUNCTIONAL

## 2023-02-01 RX ORDER — PROPOFOL 10 MG/ML
VIAL (ML) INTRAVENOUS
Status: DISCONTINUED | OUTPATIENT
Start: 2023-02-01 | End: 2023-02-01

## 2023-02-01 RX ORDER — FENTANYL CITRATE 50 UG/ML
50 INJECTION, SOLUTION INTRAMUSCULAR; INTRAVENOUS
Status: COMPLETED | OUTPATIENT
Start: 2023-02-01 | End: 2023-02-01

## 2023-02-01 RX ORDER — CLINDAMYCIN PHOSPHATE 600 MG/50ML
600 INJECTION, SOLUTION INTRAVENOUS
Status: COMPLETED | OUTPATIENT
Start: 2023-02-01 | End: 2023-02-01

## 2023-02-01 RX ORDER — OXYCODONE HYDROCHLORIDE 5 MG/1
5 TABLET ORAL ONCE AS NEEDED
Status: DISCONTINUED | OUTPATIENT
Start: 2023-02-01 | End: 2023-02-01 | Stop reason: HOSPADM

## 2023-02-01 RX ORDER — MUPIROCIN 20 MG/G
OINTMENT TOPICAL
Status: DISCONTINUED | OUTPATIENT
Start: 2023-02-01 | End: 2023-02-01 | Stop reason: HOSPADM

## 2023-02-01 RX ORDER — FENTANYL CITRATE 50 UG/ML
50 INJECTION, SOLUTION INTRAMUSCULAR; INTRAVENOUS ONCE
Status: COMPLETED | OUTPATIENT
Start: 2023-02-01 | End: 2023-02-01

## 2023-02-01 RX ORDER — ONDANSETRON HYDROCHLORIDE 8 MG/1
8 TABLET, FILM COATED ORAL EVERY 12 HOURS PRN
Qty: 24 TABLET | Refills: 1 | Status: SHIPPED | OUTPATIENT
Start: 2023-02-01 | End: 2023-02-08

## 2023-02-01 RX ORDER — OXYCODONE AND ACETAMINOPHEN 5; 325 MG/1; MG/1
1 TABLET ORAL
Qty: 42 TABLET | Refills: 0 | Status: SHIPPED | OUTPATIENT
Start: 2023-02-01 | End: 2023-02-08

## 2023-02-01 RX ORDER — EPHEDRINE SULFATE 50 MG/ML
INJECTION, SOLUTION INTRAVENOUS
Status: DISCONTINUED | OUTPATIENT
Start: 2023-02-01 | End: 2023-02-01

## 2023-02-01 RX ORDER — BUPIVACAINE HYDROCHLORIDE 5 MG/ML
INJECTION, SOLUTION EPIDURAL; INTRACAUDAL
Status: COMPLETED | OUTPATIENT
Start: 2023-02-01 | End: 2023-02-01

## 2023-02-01 RX ORDER — EPINEPHRINE 1 MG/ML
INJECTION INTRAMUSCULAR; INTRAVENOUS; SUBCUTANEOUS
Status: DISCONTINUED | OUTPATIENT
Start: 2023-02-01 | End: 2023-02-01 | Stop reason: HOSPADM

## 2023-02-01 RX ORDER — FENTANYL CITRATE 50 UG/ML
25 INJECTION, SOLUTION INTRAMUSCULAR; INTRAVENOUS EVERY 5 MIN PRN
Status: DISCONTINUED | OUTPATIENT
Start: 2023-02-01 | End: 2023-02-01 | Stop reason: HOSPADM

## 2023-02-01 RX ORDER — MIDAZOLAM HYDROCHLORIDE 1 MG/ML
2 INJECTION INTRAMUSCULAR; INTRAVENOUS ONCE
Status: COMPLETED | OUTPATIENT
Start: 2023-02-01 | End: 2023-02-01

## 2023-02-01 RX ORDER — SODIUM CHLORIDE, SODIUM LACTATE, POTASSIUM CHLORIDE, CALCIUM CHLORIDE 600; 310; 30; 20 MG/100ML; MG/100ML; MG/100ML; MG/100ML
INJECTION, SOLUTION INTRAVENOUS CONTINUOUS
Status: DISCONTINUED | OUTPATIENT
Start: 2023-02-01 | End: 2023-02-01 | Stop reason: HOSPADM

## 2023-02-01 RX ORDER — ONDANSETRON 2 MG/ML
4 INJECTION INTRAMUSCULAR; INTRAVENOUS ONCE AS NEEDED
Status: DISCONTINUED | OUTPATIENT
Start: 2023-02-01 | End: 2023-02-01 | Stop reason: HOSPADM

## 2023-02-01 RX ORDER — FENTANYL CITRATE 50 UG/ML
INJECTION, SOLUTION INTRAMUSCULAR; INTRAVENOUS
Status: DISCONTINUED | OUTPATIENT
Start: 2023-02-01 | End: 2023-02-01

## 2023-02-01 RX ORDER — LIDOCAINE HYDROCHLORIDE 20 MG/ML
INJECTION INTRAVENOUS
Status: DISCONTINUED | OUTPATIENT
Start: 2023-02-01 | End: 2023-02-01

## 2023-02-01 RX ORDER — DEXAMETHASONE SODIUM PHOSPHATE 4 MG/ML
INJECTION, SOLUTION INTRA-ARTICULAR; INTRALESIONAL; INTRAMUSCULAR; INTRAVENOUS; SOFT TISSUE
Status: DISCONTINUED | OUTPATIENT
Start: 2023-02-01 | End: 2023-02-01

## 2023-02-01 RX ORDER — ONDANSETRON 2 MG/ML
INJECTION INTRAMUSCULAR; INTRAVENOUS
Status: DISCONTINUED | OUTPATIENT
Start: 2023-02-01 | End: 2023-02-01

## 2023-02-01 RX ORDER — KETAMINE HCL IN 0.9 % NACL 50 MG/5 ML
SYRINGE (ML) INTRAVENOUS
Status: DISCONTINUED | OUTPATIENT
Start: 2023-02-01 | End: 2023-02-01

## 2023-02-01 RX ORDER — SODIUM CHLORIDE, SODIUM LACTATE, POTASSIUM CHLORIDE, CALCIUM CHLORIDE 600; 310; 30; 20 MG/100ML; MG/100ML; MG/100ML; MG/100ML
125 INJECTION, SOLUTION INTRAVENOUS CONTINUOUS
Status: DISCONTINUED | OUTPATIENT
Start: 2023-02-01 | End: 2023-02-01 | Stop reason: HOSPADM

## 2023-02-01 RX ORDER — MIDAZOLAM HYDROCHLORIDE 1 MG/ML
INJECTION, SOLUTION INTRAMUSCULAR; INTRAVENOUS
Status: DISCONTINUED | OUTPATIENT
Start: 2023-02-01 | End: 2023-02-01

## 2023-02-01 RX ORDER — ROCURONIUM BROMIDE 10 MG/ML
INJECTION, SOLUTION INTRAVENOUS
Status: DISCONTINUED | OUTPATIENT
Start: 2023-02-01 | End: 2023-02-01

## 2023-02-01 RX ORDER — LIDOCAINE HYDROCHLORIDE 10 MG/ML
1 INJECTION, SOLUTION EPIDURAL; INFILTRATION; INTRACAUDAL; PERINEURAL ONCE
Status: DISCONTINUED | OUTPATIENT
Start: 2023-02-01 | End: 2023-02-01 | Stop reason: HOSPADM

## 2023-02-01 RX ORDER — ACETAMINOPHEN 10 MG/ML
INJECTION, SOLUTION INTRAVENOUS
Status: DISCONTINUED | OUTPATIENT
Start: 2023-02-01 | End: 2023-02-01

## 2023-02-01 RX ADMIN — EPHEDRINE SULFATE 10 MG: 50 INJECTION INTRAVENOUS at 07:02

## 2023-02-01 RX ADMIN — Medication 25 MG: at 07:02

## 2023-02-01 RX ADMIN — DEXAMETHASONE SODIUM PHOSPHATE 4 MG: 4 INJECTION, SOLUTION INTRAMUSCULAR; INTRAVENOUS at 07:02

## 2023-02-01 RX ADMIN — LIDOCAINE HYDROCHLORIDE 100 MG: 20 INJECTION INTRAVENOUS at 07:02

## 2023-02-01 RX ADMIN — BUPIVACAINE HYDROCHLORIDE 10 ML: 5 INJECTION, SOLUTION EPIDURAL; INTRACAUDAL; PERINEURAL at 06:02

## 2023-02-01 RX ADMIN — SODIUM CHLORIDE, SODIUM LACTATE, POTASSIUM CHLORIDE, AND CALCIUM CHLORIDE: .6; .31; .03; .02 INJECTION, SOLUTION INTRAVENOUS at 07:02

## 2023-02-01 RX ADMIN — MIDAZOLAM 2 MG: 1 INJECTION INTRAMUSCULAR; INTRAVENOUS at 06:02

## 2023-02-01 RX ADMIN — ROCURONIUM BROMIDE 30 MG: 10 INJECTION, SOLUTION INTRAVENOUS at 07:02

## 2023-02-01 RX ADMIN — FENTANYL CITRATE 50 MCG: 50 INJECTION, SOLUTION INTRAMUSCULAR; INTRAVENOUS at 09:02

## 2023-02-01 RX ADMIN — FENTANYL CITRATE 50 MCG: 50 INJECTION, SOLUTION INTRAMUSCULAR; INTRAVENOUS at 07:02

## 2023-02-01 RX ADMIN — PROPOFOL 150 MG: 10 INJECTION, EMULSION INTRAVENOUS at 07:02

## 2023-02-01 RX ADMIN — CLINDAMYCIN IN 5 PERCENT DEXTROSE 600 MG: 12 INJECTION, SOLUTION INTRAVENOUS at 06:02

## 2023-02-01 RX ADMIN — ACETAMINOPHEN 1000 MG: 10 INJECTION, SOLUTION INTRAVENOUS at 07:02

## 2023-02-01 RX ADMIN — FENTANYL CITRATE 50 MCG: 50 INJECTION INTRAMUSCULAR; INTRAVENOUS at 07:02

## 2023-02-01 RX ADMIN — MIDAZOLAM HYDROCHLORIDE 2 MG: 1 INJECTION, SOLUTION INTRAMUSCULAR; INTRAVENOUS at 07:02

## 2023-02-01 RX ADMIN — ONDANSETRON 4 MG: 2 INJECTION, SOLUTION INTRAMUSCULAR; INTRAVENOUS at 09:02

## 2023-02-01 RX ADMIN — EPHEDRINE SULFATE 5 MG: 50 INJECTION INTRAVENOUS at 08:02

## 2023-02-01 RX ADMIN — BUPIVACAINE 20 ML: 13.3 INJECTION, SUSPENSION, LIPOSOMAL INFILTRATION at 06:02

## 2023-02-01 RX ADMIN — MUPIROCIN: 20 OINTMENT TOPICAL at 06:02

## 2023-02-01 RX ADMIN — FENTANYL CITRATE 50 MCG: 50 INJECTION INTRAMUSCULAR; INTRAVENOUS at 06:02

## 2023-02-01 RX ADMIN — SODIUM CHLORIDE, SODIUM LACTATE, POTASSIUM CHLORIDE, AND CALCIUM CHLORIDE: .6; .31; .03; .02 INJECTION, SOLUTION INTRAVENOUS at 06:02

## 2023-02-01 NOTE — INTERVAL H&P NOTE
The patient has been examined and the H&P has been reviewed:    I concur with the findings and no changes have occurred since H&P was written.    Surgery risks, benefits and alternative options discussed and understood by patient/family.      Javier Hammonds MD    There are no hospital problems to display for this patient.

## 2023-02-01 NOTE — ANESTHESIA PREPROCEDURE EVALUATION
02/01/2023  Gricelda Hassan is a 72 y.o., female.      Pre-op Assessment    I have reviewed the Patient Summary Reports.     I have reviewed the Nursing Notes. I have reviewed the NPO Status.   I have reviewed the Medications.     Review of Systems  Anesthesia Hx:  No problems with previous Anesthesia    Social:  Non-Smoker    Cardiovascular:   Denies Hypertension.  Denies MI.  Denies CAD.    Denies CABG/stent.   Denies Angina.    Pulmonary:   Denies COPD.  Denies Asthma.  Denies Recent URI.    Renal/:   Denies Chronic Renal Disease.     Hepatic/GI:   Denies GERD. Denies Liver Disease.    Neurological:   Denies TIA. Denies CVA. Denies Seizures.    Endocrine:   Denies Diabetes. Denies Hypothyroidism.    Psych:   Denies Psychiatric History.          Physical Exam  General: Well nourished, Cooperative, Alert and Oriented    Airway:  Mallampati: II / II  Mouth Opening: Normal  TM Distance: 4 - 6 cm  Tongue: Normal    Dental:  Intact    Chest/Lungs:  Clear to auscultation, Normal Respiratory Rate    Heart:  Rate: Normal  Rhythm: Regular Rhythm  Sounds: Normal        Anesthesia Plan  Type of Anesthesia, risks & benefits discussed:    Anesthesia Type: Gen Natural Airway  Intra-op Monitoring Plan: Standard ASA Monitors  Induction:  IV  Informed Consent: Informed consent signed with the Patient and all parties understand the risks and agree with anesthesia plan.  All questions answered.   ASA Score: 2    Ready For Surgery From Anesthesia Perspective.     .

## 2023-02-01 NOTE — ANESTHESIA POSTPROCEDURE EVALUATION
Anesthesia Post Evaluation    Patient: Gricelda Hassan    Procedure(s) Performed: Procedure(s) (LRB):  BUNIONECTOMY, LAPIDUS (Left)  OPEN TREATMENT, DISLOCATION, MTP JOINT (Left)  CORRECTION, HAMMER TOE (Left)  OSTECTOMY, METATARSAL BONE, HEAD (Left)    Final Anesthesia Type: general      Patient location during evaluation: PACU  Patient participation: Yes- Able to Participate  Level of consciousness: awake and alert and oriented  Post-procedure vital signs: reviewed and stable  Pain management: adequate  Airway patency: patent    PONV status at discharge: No PONV  Anesthetic complications: no      Cardiovascular status: blood pressure returned to baseline  Respiratory status: unassisted, spontaneous ventilation and room air  Hydration status: euvolemic  Follow-up not needed.          Vitals Value Taken Time   /70 02/01/23 1140   Temp 36.7 °C (98.1 °F) 02/01/23 1131   Pulse 82 02/01/23 1140   Resp 12 02/01/23 1140   SpO2 100 % 02/01/23 1140         Event Time   Out of Recovery 11:36:48         Pain/Tay Score: Pain Rating Prior to Med Admin: 0 (2/1/2023  7:01 AM)  Tay Score: 9 (2/1/2023 11:36 AM)

## 2023-02-01 NOTE — ANESTHESIA PROCEDURE NOTES
Intubation    Date/Time: 2/1/2023 7:19 AM  Performed by: Vane Willis CRNA  Authorized by: Timbo Marroquin MD     Intubation:     Induction:  Intravenous    Intubated:  Postinduction    Mask Ventilation:  Easy mask    Attempts:  1    Attempted By:  CRNA    Method of Intubation:  Video laryngoscopy    Blade:  Saldaña 3    Laryngeal View Grade: Grade I - full view of cords      Difficult Airway Encountered?: No      Complications:  None    Airway Device:  Oral endotracheal tube    Airway Device Size:  7.0    Style/Cuff Inflation:  Cuffed (inflated to minimal occlusive pressure)    Tube secured:  20    Secured at:  The lips    Placement Verified By:  Capnometry    Complicating Factors:  None    Findings Post-Intubation:  BS equal bilateral and atraumatic/condition of teeth unchanged

## 2023-02-01 NOTE — ANESTHESIA PROCEDURE NOTES
Peripheral Block    Patient location during procedure: pre-op   Block not for primary anesthetic.  Reason for block: at surgeon's request and post-op pain management   Post-op Pain Location: BUNION LEFT FOOT--BUNIONECTOMY LEFT FOOT   Start time: 2/1/2023 6:45 AM  Timeout: 2/1/2023 6:45 AM   End time: 2/1/2023 6:55 AM    Staffing  Authorizing Provider: Timbo Marroquin MD  Performing Provider: Timbo Marroquin MD    Preanesthetic Checklist  Completed: patient identified, IV checked, site marked, risks and benefits discussed, surgical consent, monitors and equipment checked, pre-op evaluation and timeout performed  Peripheral Block  Patient position: supine  Prep: ChloraPrep  Patient monitoring: cardiac monitor, heart rate, continuous pulse ox, continuous capnometry and frequent blood pressure checks  Block type: popliteal  Laterality: left  Injection technique: single shot  Needle  Needle type: Stimuplex   Needle localization: ultrasound guidance   -ultrasound image captured on disc.  Assessment  Injection assessment: negative parasthesia, local visualized surrounding nerve and negative aspiration  Paresthesia pain: none  Heart rate change: no  Slow fractionated injection: yes  Pain Tolerance: comfortable throughout block and no complaints  Medications:    Medications: bupivacaine (pf) (MARCAINE) injection 0.5% - Perineural   10 mL - 2/1/2023 6:50:00 AM    Additional Notes  EXPAREL

## 2023-02-01 NOTE — OR NURSING
Left popliteal single shot block complete per Dr. Marroquin with Anesthesia. This RN at bedside to assist Dr. Marroquin. Pt tolerated well. See Anesthesia record for procedural notes. See flowsheet and MAR for vitals and medications. Monitors in place, alarms audible. VSS. etCO2 monitoring in place. Resp even, unlabored. Skin warm, dry. Pt in NAD. Pt awaiting transport to OR. Family at bedside. Bed in lowest, locked position. Side rails up x2. Call light within reach.

## 2023-02-01 NOTE — DISCHARGE SUMMARY
[facility] - [facility]  Discharge Note  Short Stay    Procedure(s) (LRB):  BUNIONECTOMY, LAPIDUS (Left)  OPEN TREATMENT, DISLOCATION, MTP JOINT (Left)  CORRECTION, HAMMER TOE (Left)  OSTECTOMY, METATARSAL BONE, HEAD (Left)      OUTCOME: Patient tolerated treatment/procedure well without complication and is now ready for discharge.    DISPOSITION: Home or Self Care    FINAL DIAGNOSIS:  <principal problem not specified>    FOLLOWUP: In clinic    DISCHARGE INSTRUCTIONS:  No discharge procedures on file.     TIME SPENT ON DISCHARGE: 15 minutes

## 2023-02-01 NOTE — OP NOTE
Date of Surgery: 02/01/2023    Pre-Operative Diagnosis:  Left hallux valgus with metatarsus primus adductus.  Left 2nd metatarsophalangeal joint dorsal dislocation.  Left 2nd metatarsalgia.  Left 2nd hammertoe deformity.    Post-Operative Diagnosis:  Left hallux valgus with metatarsus primus adductus.  Left 2nd metatarsophalangeal joint dorsal dislocation.  Left 2nd metatarsalgia.  Left 2nd hammertoe deformity.    Procedures:  Left lapidus procedure. 29171.  Open reduction of Left 2nd metatarsophalangeal joint dislocation. 41300.  Duvries plantar 2nd metatarsal osteotomy. 14046.  Correction of Left 2nd hammertoe. 40277.    Surgeon: Javier Hammonds MD    Assist: ERROL Padilla.    Anesthesia: General with regional block.    Estimated Blood Loss: <5mL.    Drains: None.    Findings: Severe hallux valgus. 2nd over 1st crossover toe deformity. 2nd plantar metatarsal head prominence with callus. Rigid 2nd hammertoe.    Implants: Crossroads nitinol staples: 32n72ov wide body; 17r59sc standard; 9x9mm standard. 0.045 k-wire x 1.    Operative Indication:  Gricelda Hassan is a 72 y.o. female with a longstanding history worsening left forefoot pain and deformity related to chronic hallux valgus and 2nd MTP joint dorsal dislocation with 2nd over 1st crossover toe deformity.  Operative versus non operative treatment options were discussed.  Risks and benefits were described.  These include but are not limited to bleeding; infection; damage to surrounding nerves or vessels; persistent pain, stiffness; nonunion; malunion; need for additional procedures; amputation; blood clots; pulmonary embolus; cardiac events; stroke; and the general risks of anesthesia including anesthetic death.   We also reviewed postop protocol as well as limitations and expectations. Patient understands and desires to proceed with surgical intervention. Consent was obtained and the left lower leg was marked.    Operative Procedure:  The patient was  transferred to the operating room, transferred to the OR table in a supine position then placed under general endotracheal anesthesia by the anesthesiology service. All bony prominences were appropriately padded.  Patient was secured to the table.  A nonsterile tourniquet was placed on the left thigh.  An SCD was placed on the contralateral lower limb.  The patient was prepped and draped in usual sterile fashion.  A time-out was then called.  The patient, procedure, surgical site was verified and everyone was in agreement.  Preoperative IV antibiotics were administered. The surgical extremity was exsanguinated with an esmarch bandage and the tourniquet was inflated to 300mmHg.  An approximately 4 cm longitudinal incision was made over the medial aspect of the 2nd ray.  Subcutaneous vessels were cauterized.  Dissection was taken down deep to the level of the extensor tendon and extensor cristobal.  A Z-lengthening tenotomy of the extensor tendon was performed.  The dorsal capsule as well as the medial and lateral collateral ligaments at the 2nd MTP joint were released and with this the dislocation could be relocated.  Following this, the 2nd toe was maximally plantar flexed and then using a microsagittal saw a Duvries plantar condylectomy was performed.  The prominent plantar condyle was removed and then smoothed with a nasal rasp with palpation underneath the 2nd metatarsal head.  This decompressed the area of previous prominence.    Following this, attention was directed toward the hammertoe deformity.  A transverse elliptical incision was made centered over the 2nd toe PIP joint.  The collateral ligaments were released and then using a microsagittal saw, the head of the proximal phalanx was resected.  The base of the middle phalanx was denuded of any residual cartilage using a small curette.  The wound was then copiously irrigated.  This allowed correction of the hammertoe deformity.  Following this, a 0.045 K-wire was  retrograded out of the 2nd toe and then with a hammertoe in his corrected position, the K-wire was advanced into the proximal phalanx and then finally, with the MTP joint in his corrected position, the K-wire was advanced into the corresponding 2nd metatarsal.  Intraoperative fluoroscopy was used to verify satisfactory correction of the toe deformities as well as satisfactory position of the K-wire.    Attention was turned to the lateral border of the 1st metatarsal head where dissection was taken deep down to the level of the lateral sesamoid.  The metatarsal sesamoid ligament was identified and transected to allow for adequate mobilization.    Attention was turned to the 1st TMT joint where a separate 2 cm incision was made using a 15. Blade..  Subcutaneous vessels were cauterized.  The EHL tendon was identified and retracted for the duration of the procedure.  The TMT joint capsule was incised.  Hypermobility was noted.  The joint was denuded of any residual cartilage using a combination osteotome and curette.  The wound was then copiously irrigated.  The joint was then prepared using combination 2.5 mm drill bit and a small curved osteotome using a fish scaling technique.  1 cc of bio for allograft bone putty was utilized.    Attention was turned to the medial aspect of the 1st MTP joint where a separate 3 cm incision was made using a 15. Blade.  Subcutaneous vessels were cauterized and the dorsal medial cutaneous nerve was identified and protected.  The capsule was sharply incised, taking care to remove an elliptical portion of this to allow for medial imbrication at the conclusion of the procedure.  The medial sesamoid was freed to allow for adequate mobilization.  A microsagittal saw was used to resect the prominent medial eminence.  At this point the 1st metatarsal was de rotated relative to the cuneiform and the intermetatarsal angle was closed for adequate hallux valgus correction.  First TMT joint was  provisionally pinned.  After confirming satisfactory position both clinically and under fluoroscopy, Nitinol staples x2 were then placed.  Overall satisfactory reduction and stable fixation were appreciated.  Imbrication of the medial capsule was performed using 0 Vicryl in a pants-over-vest fashion.  There was still some degree of interphalangeus present thus decision was made to perform an Akin osteotomy.  Approximately 2 mm wedge of bone was removed taking care to not to violate the lateral cortex.  A 9 x 9 mm all patch was then used for fixation.  Overall satisfactory reduction and stable fixation of the previously documented hallux valgus, 2nd MTP joint dislocation, and 2nd hammertoe deformity were appreciated.  All wounds were copiously irrigated and closed in standard layered fashion including 2-0 Vicryl deep, 3-0 Monocryl subcuticularly, and 3-0 nylon on the skin.  Sterile dressings including Xeroform, 4 x 4 gauze, Webril, and a well-padded short leg splint in neutral dorsiflexion.  Patient was reversed from anesthesia and transferred to recovery in stable condition.    Javier Hammonds MD

## 2023-02-01 NOTE — BRIEF OP NOTE
[facility] - Surgery  Brief Operative Note    SUMMARY     Surgery Date: 2/1/2023     Surgeon(s) and Role:     * Javier Hammonds MD - Primary    Assisting Surgeon: None    Pre-op Diagnosis:  Acquired hallux valgus with metatarsus primus varus of left foot [Q66.212]    Post-op Diagnosis: Post-Op Diagnosis Codes:     * Acquired hallux valgus with metatarsus primus varus of left foot [Q66.212]      Procedure(s) (LRB):  BUNIONECTOMY, LAPIDUS (Left)  OPEN TREATMENT, DISLOCATION, MTP JOINT (Left)  CORRECTION, HAMMER TOE (Left)  OSTECTOMY, METATARSAL BONE, HEAD (Left)    Implants: [employer] nitinol staples: 16s81qp wide body; 24b23ky standard; 9x9mm standard. 0.045 k-wire x 1.    Operative Findings: severe hallux valgus. 2nd over 1st crossover toe deformity. 2nd plantar metatarsal head prominence with callus.    Estimated Blood Loss: * No values recorded between 2/1/2023  7:43 AM and 2/1/2023 11:23 AM *         Specimens:   Specimen (24h ago, onward)      None

## 2023-02-02 ENCOUNTER — PATIENT MESSAGE (OUTPATIENT)
Dept: ORTHOPEDICS | Facility: CLINIC | Age: 73
End: 2023-02-02
Payer: MEDICARE

## 2023-02-02 VITALS
RESPIRATION RATE: 12 BRPM | SYSTOLIC BLOOD PRESSURE: 158 MMHG | DIASTOLIC BLOOD PRESSURE: 71 MMHG | WEIGHT: 122 LBS | TEMPERATURE: 98 F | BODY MASS INDEX: 21.62 KG/M2 | OXYGEN SATURATION: 100 % | HEIGHT: 63 IN | HEART RATE: 72 BPM

## 2023-02-02 NOTE — TELEPHONE ENCOUNTER
Clarified with pt that oxycodone contains 325 mg of tylenol per dose. Total mg of Tylenol per dose not to be exceeded is 4,000. Pt verbally confirmed instruction to take oxy 6xs a day and 500 mg Tylenol a max of 4 times per day in between Oxy.     Pt is also aware that she can take Gabapentin 3 times a day for the next couple days to help with some of the nerve pain that she is feeling.     Pt will let us know if she needs any other assistance/clarification.

## 2023-02-07 ENCOUNTER — PATIENT MESSAGE (OUTPATIENT)
Dept: ORTHOPEDICS | Facility: CLINIC | Age: 73
End: 2023-02-07
Payer: MEDICARE

## 2023-02-07 NOTE — Clinical Note
A percutaneous stick to the left femoral and right femoral  Odomzo Pregnancy And Lactation Text: This medication is Pregnancy Category X and is absolutely contraindicated during pregnancy. It is unknown if it is excreted in breast milk.

## 2023-02-14 DIAGNOSIS — M20.12 HALLUX VALGUS WITH BUNIONS OF LEFT FOOT: Primary | ICD-10-CM

## 2023-02-14 DIAGNOSIS — M21.612 HALLUX VALGUS WITH BUNIONS OF LEFT FOOT: Primary | ICD-10-CM

## 2023-02-16 ENCOUNTER — OFFICE VISIT (OUTPATIENT)
Dept: ORTHOPEDICS | Facility: CLINIC | Age: 73
End: 2023-02-16
Payer: MEDICARE

## 2023-02-16 ENCOUNTER — HOSPITAL ENCOUNTER (OUTPATIENT)
Dept: RADIOLOGY | Facility: HOSPITAL | Age: 73
Discharge: HOME | End: 2023-02-16
Attending: ORTHOPAEDIC SURGERY
Payer: MEDICARE

## 2023-02-16 DIAGNOSIS — M20.42 HAMMERTOE OF SECOND TOE OF LEFT FOOT: ICD-10-CM

## 2023-02-16 DIAGNOSIS — M20.12 HALLUX VALGUS WITH BUNIONS OF LEFT FOOT: Primary | ICD-10-CM

## 2023-02-16 DIAGNOSIS — M21.612 HALLUX VALGUS WITH BUNIONS OF LEFT FOOT: Primary | ICD-10-CM

## 2023-02-16 DIAGNOSIS — M20.12 HALLUX VALGUS WITH BUNIONS OF LEFT FOOT: ICD-10-CM

## 2023-02-16 DIAGNOSIS — S93.129A CLOSED DISLOCATION OF METATARSOPHALANGEAL (JOINT), INITIAL ENCOUNTER: ICD-10-CM

## 2023-02-16 DIAGNOSIS — M21.612 HALLUX VALGUS WITH BUNIONS OF LEFT FOOT: ICD-10-CM

## 2023-02-16 DIAGNOSIS — M77.42 METATARSALGIA OF LEFT FOOT: ICD-10-CM

## 2023-02-16 PROCEDURE — 99024 POSTOP FOLLOW-UP VISIT: CPT | Mod: POP,,, | Performed by: ORTHOPAEDIC SURGERY

## 2023-02-16 PROCEDURE — 73630 XR FOOT COMPLETE 3 VIEW LEFT: ICD-10-PCS | Mod: 26,LT,, | Performed by: RADIOLOGY

## 2023-02-16 PROCEDURE — 73630 X-RAY EXAM OF FOOT: CPT | Mod: 26,LT,, | Performed by: RADIOLOGY

## 2023-02-16 PROCEDURE — 99024 PR POST-OP FOLLOW-UP VISIT: ICD-10-PCS | Mod: POP,,, | Performed by: ORTHOPAEDIC SURGERY

## 2023-02-16 PROCEDURE — 73630 X-RAY EXAM OF FOOT: CPT | Mod: TC,PO,LT

## 2023-02-16 RX ORDER — OXYCODONE AND ACETAMINOPHEN 5; 325 MG/1; MG/1
1 TABLET ORAL EVERY 8 HOURS PRN
Qty: 21 TABLET | Refills: 0 | Status: SHIPPED | OUTPATIENT
Start: 2023-02-16 | End: 2023-02-23

## 2023-02-16 NOTE — PROGRESS NOTES
Status/Diagnosis: Left hallux valgus with crossover toe deformity; 2nd MTP dorsal dislocation; 2nd metatarsalgia; rigid 2nd hammertoe  Date of Surgery: 02/01/2023  Date of Injury: none  Return visit: 2/24/2023  X-rays on Return: none    Present History:  Gricelda Hassan is a 72 y.o. female who presents today for 1st postop clinic visit.  Overall doing well with minimal complaints of pain.  Only requires Percocet when trying to sleep at night.  She has not been taking aspirin as prescribed due to concern for postop bleeding.  Also endorses she and her  are leaving to go to the beach later today.  Denies any drainage, fever, chills.  Reports compliance with nonweightbearing status.      Physical exam:  There were no vitals filed for this visit.  There is no height or weight on file to calculate BMI.  General: In no apparent distress; well developed and well nourished.  HEENT: normocephalic; atraumatic.  Cardiovascular: regular rate.  Respiratory: no increased work of breathing.  Musculoskeletal:   Gait: unable to assess.  Inspection:  Surgical sites healing well with nylon sutures in place.  Moderate residual swelling, most prominent over the 1st MTP joint.  Concomitant ecchymosis.  No drainage, warmth, erythema.  No signs or symptoms of infection.  Overall alignment well-maintained status post hallux valgus correction with 2nd MTP relocation and 2nd hammertoe correction.  Percutaneous pinning of the 2nd toe in place-clean, dry, intact. Slightly altered sensation along the dorsum of the great toe, otherwise gross motor and sensory function intact.  Palpable pedal pulse.     Imaging Studies/Outside documentation:  I have ordered/reviewed/interpreted the following images/outside documentation:  1. NWB 3-views Left foot:  Status post hallux valgus correction with 1st TMT joint fusion and proximal phalangeal osteotomy.  Also with 2nd MTP relocation and hammertoe correction with percutaneous pin in place.   Overall alignment well-maintained.  No evidence of implant loosening or failure.     Assessment:  Gricelda Hassan is a 72 y.o. female with Left hallux valgus with crossover toe deformity; 2nd MTP dorsal dislocation; 2nd metatarsalgia; rigid 2nd hammertoe.     Plan:   Clinical and radiographic findings were discussed.  Due to mild-to-moderate residual postoperative swelling, sutures retained today.  New bunion dressing was placed by myself.  Patient was then placed into a well-padded short-leg plaster splint in neutral dorsiflexion.  Reinforced the importance of continued elevation to help with postop swelling.  Also reinforced importance of baby aspirin once daily for DVT prophylaxis.  Patient to remain strict nonweightbearing left lower extremity.  Return to clinic in 1 week for repeat evaluation and wound check.  No new x-rays.      This note was created using voice recognition software and may contain grammatical errors.

## 2023-02-23 PROBLEM — I70.0 ATHEROSCLEROSIS OF AORTA: Status: ACTIVE | Noted: 2023-02-23

## 2023-02-23 NOTE — PROGRESS NOTES
Subjective:    Patient ID:  Gricelda Hassan is a 72 y.o. female who presents for follow-up of AVNRT  (6 month f/u )      HPI 71 yo female with SVT, Htn, Aortic atherosclerosis (managed by Dr. Gold)     Background:  Primary cardiologist is Dr. Gold. Previously seen by Dr. Carrasco.     Notes indicate paroxysmal AF, first diagnosed 7/4/19. She is not aware of this diagnosis. She reports atrial tachycardia.  She has noted palpitations since 2017. First episode occurred while in [place]. She was with a physician, and was told she needed to see an EP.   Wore a holter monitor, and was told she has atrial tachycardia. Offered RFA, she deferred.  Placed on Propafenone 150 mg BID. Switched to Propafenone  mg BID 3 years ago.  Still notes occasional palpitations. Acute onset and gradual offset, lasting up to 9 hours. She reports HR's are generally in the 130's. Occurring every couple of months.     ILR reveals no AF, episodes c/w sinus tachycardia.     On Propafenone   Echo 10/18/21 normal biventricular structure and function, normal left atrial size.  Spect stress 10/21/21 negative.    RFA 2/7/22 Typical AVNRT induced. Slow pathway ablation performed. Propafenone discontinued.     Update:  ILR reveals rare nonsustained AT, no significant arrhythmias  Feeling great. No palpitations. Happy with her energy level.    Review of Systems   All other systems reviewed and are negative.     Objective:    Physical Exam  Constitutional:       Appearance: She is well-developed.   Eyes:      General: No scleral icterus.     Conjunctiva/sclera: Conjunctivae normal.   Neck:      Vascular: No JVD.      Trachea: No tracheal deviation.   Cardiovascular:      Rate and Rhythm: Normal rate and regular rhythm.      Chest Wall: PMI is not displaced.   Pulmonary:      Effort: Pulmonary effort is normal. No respiratory distress.      Breath sounds: Normal breath sounds.   Abdominal:      Palpations: Abdomen is soft.      Tenderness: There  is no abdominal tenderness.   Musculoskeletal:         General: No tenderness.   Skin:     General: Skin is warm and dry.      Findings: No rash.   Neurological:      Mental Status: She is alert and oriented to person, place, and time.   Psychiatric:         Behavior: Behavior normal.         Assessment:       1. AVNRT (AV masood re-entry tachycardia)    2. Atherosclerosis of aorta    3. Paroxysmal atrial fibrillation    4. Essential hypertension         Plan:           Doing well overall. Denies recurrence of SVT.  Can f/u PRN with us.

## 2023-02-24 ENCOUNTER — OFFICE VISIT (OUTPATIENT)
Dept: ORTHOPEDICS | Facility: CLINIC | Age: 73
End: 2023-02-24
Payer: MEDICARE

## 2023-02-24 DIAGNOSIS — M77.42 METATARSALGIA OF LEFT FOOT: ICD-10-CM

## 2023-02-24 DIAGNOSIS — M20.42 HAMMERTOE OF SECOND TOE OF LEFT FOOT: ICD-10-CM

## 2023-02-24 DIAGNOSIS — M20.12 HALLUX VALGUS WITH BUNIONS OF LEFT FOOT: Primary | ICD-10-CM

## 2023-02-24 DIAGNOSIS — M21.612 HALLUX VALGUS WITH BUNIONS OF LEFT FOOT: Primary | ICD-10-CM

## 2023-02-24 DIAGNOSIS — S93.129A CLOSED DISLOCATION OF METATARSOPHALANGEAL (JOINT), INITIAL ENCOUNTER: ICD-10-CM

## 2023-02-24 PROCEDURE — 99212 OFFICE O/P EST SF 10 MIN: CPT | Mod: PBBFAC,PN | Performed by: ORTHOPAEDIC SURGERY

## 2023-02-24 PROCEDURE — 99024 PR POST-OP FOLLOW-UP VISIT: ICD-10-PCS | Mod: POP,,, | Performed by: ORTHOPAEDIC SURGERY

## 2023-02-24 PROCEDURE — 99999 PR PBB SHADOW E&M-EST. PATIENT-LVL II: ICD-10-PCS | Mod: PBBFAC,,, | Performed by: ORTHOPAEDIC SURGERY

## 2023-02-24 PROCEDURE — 99024 POSTOP FOLLOW-UP VISIT: CPT | Mod: POP,,, | Performed by: ORTHOPAEDIC SURGERY

## 2023-02-24 PROCEDURE — 99999 PR PBB SHADOW E&M-EST. PATIENT-LVL II: CPT | Mod: PBBFAC,,, | Performed by: ORTHOPAEDIC SURGERY

## 2023-02-24 NOTE — PROGRESS NOTES
Status/Diagnosis: Left hallux valgus with crossover toe deformity; 2nd MTP dorsal dislocation; 2nd metatarsalgia; rigid 2nd hammertoe  Date of Surgery: 02/01/2023  Date of Injury: none  Return visit: 1 week  X-rays on Return: NWB 3-views Left foot XOP.    Present History:  Gricelda Hassan is a 72 y.o. female who returns today for routine postoperative visit and wound check.  Overall doing well with only mild intermittent pain.  Reports compliance with nonweightbearing status.  Just returned from the beach.  Swelling significantly improved.  Denies any drainage, fever, chills.      Physical exam:  There were no vitals filed for this visit.  There is no height or weight on file to calculate BMI.  General: In no apparent distress; well developed and well nourished.  HEENT: normocephalic; atraumatic.  Cardiovascular: regular rate.  Respiratory: no increased work of breathing.  Musculoskeletal:   Gait: unable to assess.  Inspection:  Surgical sites healing well with nylon sutures in place.  Significant improvement in postoperative swelling.  No drainage, warmth, erythema.  No signs or symptoms of infection.  Overall alignment well-maintained status post hallux valgus correction with 2nd MTP relocation and 2nd hammertoe correction.  Percutaneous pinning of the 2nd toe in place-clean, dry, intact. Slightly altered sensation along the dorsum of the great toe, otherwise gross motor and sensory function intact.  Palpable pedal pulse.     Imaging Studies/Outside documentation:  I have ordered/reviewed/interpreted the following images/outside documentation:  No new imaging today.     Assessment:  Gricelda Hassan is a 72 y.o. female with Left hallux valgus with crossover toe deformity; 2nd MTP dorsal dislocation; 2nd metatarsalgia; rigid 2nd hammertoe.     Plan:   Sutures removed and Steri-Strips placed today without complication.  We will transition from bunion dressing to a bunion gel insert-patient already has obtain  this.  Patient placed back into a well-padded short-leg plaster splint.  Bunion gel spacer to be worn at all times.  Continue strict nonweightbearing left lower extremity.  Continue aspirin for DVT prophylaxis.  Return to clinic in 1 week for repeat evaluation x-ray.  Plan for percutaneous 2nd toe pin removal at that time with initiation of 2nd toe taping.      This note was created using voice recognition software and may contain grammatical errors.

## 2023-02-27 ENCOUNTER — CLINICAL SUPPORT (OUTPATIENT)
Dept: CARDIOLOGY | Facility: HOSPITAL | Age: 73
End: 2023-02-27
Attending: INTERNAL MEDICINE
Payer: MEDICARE

## 2023-02-27 ENCOUNTER — OFFICE VISIT (OUTPATIENT)
Dept: CARDIOLOGY | Facility: CLINIC | Age: 73
End: 2023-02-27
Payer: MEDICARE

## 2023-02-27 VITALS
BODY MASS INDEX: 21.26 KG/M2 | HEIGHT: 63 IN | HEART RATE: 68 BPM | WEIGHT: 120 LBS | DIASTOLIC BLOOD PRESSURE: 74 MMHG | SYSTOLIC BLOOD PRESSURE: 124 MMHG

## 2023-02-27 DIAGNOSIS — I70.0 ATHEROSCLEROSIS OF AORTA: ICD-10-CM

## 2023-02-27 DIAGNOSIS — M20.12 HALLUX VALGUS WITH BUNIONS OF LEFT FOOT: Primary | ICD-10-CM

## 2023-02-27 DIAGNOSIS — I10 ESSENTIAL HYPERTENSION: ICD-10-CM

## 2023-02-27 DIAGNOSIS — I48.0 PAROXYSMAL ATRIAL FIBRILLATION: ICD-10-CM

## 2023-02-27 DIAGNOSIS — Z95.818 STATUS POST PLACEMENT OF IMPLANTABLE LOOP RECORDER: ICD-10-CM

## 2023-02-27 DIAGNOSIS — M21.612 HALLUX VALGUS WITH BUNIONS OF LEFT FOOT: Primary | ICD-10-CM

## 2023-02-27 DIAGNOSIS — I47.19 ATRIAL TACHYCARDIA: ICD-10-CM

## 2023-02-27 DIAGNOSIS — I47.19 AVNRT (AV NODAL RE-ENTRY TACHYCARDIA): Primary | ICD-10-CM

## 2023-02-27 PROCEDURE — 99215 OFFICE O/P EST HI 40 MIN: CPT | Mod: S$PBB,,, | Performed by: INTERNAL MEDICINE

## 2023-02-27 PROCEDURE — 99999 PR PBB SHADOW E&M-EST. PATIENT-LVL II: CPT | Mod: PBBFAC,,, | Performed by: INTERNAL MEDICINE

## 2023-02-27 PROCEDURE — 99215 PR OFFICE/OUTPT VISIT, EST, LEVL V, 40-54 MIN: ICD-10-PCS | Mod: S$PBB,,, | Performed by: INTERNAL MEDICINE

## 2023-02-27 PROCEDURE — 99212 OFFICE O/P EST SF 10 MIN: CPT | Mod: PBBFAC,PO | Performed by: INTERNAL MEDICINE

## 2023-02-27 PROCEDURE — 99999 PR PBB SHADOW E&M-EST. PATIENT-LVL II: ICD-10-PCS | Mod: PBBFAC,,, | Performed by: INTERNAL MEDICINE

## 2023-03-03 ENCOUNTER — OFFICE VISIT (OUTPATIENT)
Dept: ORTHOPEDICS | Facility: CLINIC | Age: 73
End: 2023-03-03
Payer: MEDICARE

## 2023-03-03 ENCOUNTER — HOSPITAL ENCOUNTER (OUTPATIENT)
Dept: RADIOLOGY | Facility: HOSPITAL | Age: 73
Discharge: HOME | End: 2023-03-03
Attending: ORTHOPAEDIC SURGERY
Payer: MEDICARE

## 2023-03-03 DIAGNOSIS — M21.612 HALLUX VALGUS WITH BUNIONS OF LEFT FOOT: ICD-10-CM

## 2023-03-03 DIAGNOSIS — M20.12 HALLUX VALGUS WITH BUNIONS OF LEFT FOOT: ICD-10-CM

## 2023-03-03 DIAGNOSIS — M20.42 HAMMERTOE OF SECOND TOE OF LEFT FOOT: ICD-10-CM

## 2023-03-03 DIAGNOSIS — S93.129A CLOSED DISLOCATION OF METATARSOPHALANGEAL (JOINT), INITIAL ENCOUNTER: Primary | ICD-10-CM

## 2023-03-03 DIAGNOSIS — M77.42 METATARSALGIA OF LEFT FOOT: ICD-10-CM

## 2023-03-03 PROCEDURE — 73630 X-RAY EXAM OF FOOT: CPT | Mod: 26,LT,, | Performed by: RADIOLOGY

## 2023-03-03 PROCEDURE — 99999 PR PBB SHADOW E&M-EST. PATIENT-LVL I: CPT | Mod: PBBFAC,,, | Performed by: ORTHOPAEDIC SURGERY

## 2023-03-03 PROCEDURE — 73630 X-RAY EXAM OF FOOT: CPT | Mod: TC,PO,LT

## 2023-03-03 PROCEDURE — 99999 PR PBB SHADOW E&M-EST. PATIENT-LVL I: ICD-10-PCS | Mod: PBBFAC,,, | Performed by: ORTHOPAEDIC SURGERY

## 2023-03-03 PROCEDURE — 99211 OFF/OP EST MAY X REQ PHY/QHP: CPT | Mod: PBBFAC,PN | Performed by: ORTHOPAEDIC SURGERY

## 2023-03-03 PROCEDURE — 99024 POSTOP FOLLOW-UP VISIT: CPT | Mod: POP,,, | Performed by: ORTHOPAEDIC SURGERY

## 2023-03-03 PROCEDURE — 73630 XR FOOT COMPLETE 3 VIEW LEFT: ICD-10-PCS | Mod: 26,LT,, | Performed by: RADIOLOGY

## 2023-03-03 PROCEDURE — 99024 PR POST-OP FOLLOW-UP VISIT: ICD-10-PCS | Mod: POP,,, | Performed by: ORTHOPAEDIC SURGERY

## 2023-03-03 NOTE — PROGRESS NOTES
Status/Diagnosis: Left hallux valgus with crossover toe deformity; 2nd MTP dorsal dislocation; 2nd metatarsalgia; rigid 2nd hammertoe  Date of Surgery: 02/01/2023  Date of Injury: none  Return visit: 3/21/2023  X-rays on Return: NWB 3-views Left foot XOP.    Present History:  Gricelda Hassan is a 72 y.o. female who returns today for routine postoperative visit and wound check.  Overall doing well.  Does endorse moderate increased in postoperative pain about the surgical site since last being seen 1 week ago.  No new injuries.  States that pain is throbbing in nature and normally occurs in the afternoon.  May or may not be gravity dependent.  Reports compliance with nonweightbearing status.  Denies any drainage, fever, chills.      Physical exam:  There were no vitals filed for this visit.  There is no height or weight on file to calculate BMI.  General: In no apparent distress; well developed and well nourished.  HEENT: normocephalic; atraumatic.  Cardiovascular: regular rate.  Respiratory: no increased work of breathing.  Musculoskeletal:   Gait: unable to assess.  Inspection:  Surgical sites well healed with Steri-Strips in place.  Significant improvement in postoperative swelling.  No drainage, warmth, erythema.  No signs or symptoms of infection.  Overall alignment well-maintained status post hallux valgus correction with 2nd MTP relocation and 2nd hammertoe correction.  Percutaneous pinning of the 2nd toe in place-clean, dry, intact. Slightly altered sensation along the dorsum of the great toe, otherwise gross motor and sensory function intact.  Palpable pedal pulse.     Imaging Studies/Outside documentation:  I have ordered/reviewed/interpreted the following images/outside documentation:  NWB 3-views Left foot: Status post hallux valgus correction with 1st TMT joint fusion and proximal phalangeal osteotomy.  Also with 2nd MTP relocation and hammertoe correction with percutaneous pin in place.  Overall  alignment well-maintained.  No evidence of implant loosening or failure.     Assessment:  Gricelda Hassan is a 72 y.o. female with Left hallux valgus with crossover toe deformity; 2nd MTP dorsal dislocation; 2nd metatarsalgia; rigid 2nd hammertoe.     Plan:   Second toe pin removed today without complication.  Previous Steri-Strips removed and every other site Steri-Strips were placed.  Patient given instructions for daily plantar 2nd MTP flexion taping.  Supplies provided.  Patient will transition to a boot to allow for showers and home ankle range of motion exercises.  No baths/submersion of the wound.  No lotions, ointments, etc. boot to be worn while sleeping.  Continue strict nonweightbearing left lower extremity.  Return to clinic on 03/21 for repeat evaluation and x-ray.  Possible initiation of weight-bearing at that time.    This note was created using voice recognition software and may contain grammatical errors.

## 2023-03-06 ENCOUNTER — PATIENT MESSAGE (OUTPATIENT)
Dept: ORTHOPEDICS | Facility: CLINIC | Age: 73
End: 2023-03-06
Payer: MEDICARE

## 2023-03-07 ENCOUNTER — PATIENT MESSAGE (OUTPATIENT)
Dept: ORTHOPEDICS | Facility: CLINIC | Age: 73
End: 2023-03-07
Payer: MEDICARE

## 2023-03-07 RX ORDER — HYDROCODONE BITARTRATE AND ACETAMINOPHEN 5; 325 MG/1; MG/1
1 TABLET ORAL EVERY 8 HOURS PRN
Qty: 21 TABLET | Refills: 0 | Status: SHIPPED | OUTPATIENT
Start: 2023-03-07 | End: 2023-03-14

## 2023-03-20 DIAGNOSIS — M20.12 HALLUX VALGUS WITH BUNIONS OF LEFT FOOT: Primary | ICD-10-CM

## 2023-03-20 DIAGNOSIS — M21.612 HALLUX VALGUS WITH BUNIONS OF LEFT FOOT: Primary | ICD-10-CM

## 2023-03-21 ENCOUNTER — HOSPITAL ENCOUNTER (OUTPATIENT)
Dept: RADIOLOGY | Facility: HOSPITAL | Age: 73
Discharge: HOME | End: 2023-03-21
Attending: ORTHOPAEDIC SURGERY
Payer: MEDICARE

## 2023-03-21 ENCOUNTER — OFFICE VISIT (OUTPATIENT)
Dept: ORTHOPEDICS | Facility: CLINIC | Age: 73
End: 2023-03-21
Payer: MEDICARE

## 2023-03-21 DIAGNOSIS — M21.612 HALLUX VALGUS WITH BUNIONS OF LEFT FOOT: Primary | ICD-10-CM

## 2023-03-21 DIAGNOSIS — S93.129A CLOSED DISLOCATION OF METATARSOPHALANGEAL (JOINT), INITIAL ENCOUNTER: ICD-10-CM

## 2023-03-21 DIAGNOSIS — M21.612 HALLUX VALGUS WITH BUNIONS OF LEFT FOOT: ICD-10-CM

## 2023-03-21 DIAGNOSIS — M20.12 HALLUX VALGUS WITH BUNIONS OF LEFT FOOT: ICD-10-CM

## 2023-03-21 DIAGNOSIS — M20.12 HALLUX VALGUS WITH BUNIONS OF LEFT FOOT: Primary | ICD-10-CM

## 2023-03-21 PROCEDURE — 99213 OFFICE O/P EST LOW 20 MIN: CPT | Mod: PBBFAC,PN | Performed by: ORTHOPAEDIC SURGERY

## 2023-03-21 PROCEDURE — 73630 XR FOOT COMPLETE 3 VIEW LEFT: ICD-10-PCS | Mod: 26,LT,, | Performed by: RADIOLOGY

## 2023-03-21 PROCEDURE — 99999 PR PBB SHADOW E&M-EST. PATIENT-LVL III: CPT | Mod: PBBFAC,,, | Performed by: ORTHOPAEDIC SURGERY

## 2023-03-21 PROCEDURE — 99024 PR POST-OP FOLLOW-UP VISIT: ICD-10-PCS | Mod: POP,,, | Performed by: ORTHOPAEDIC SURGERY

## 2023-03-21 PROCEDURE — 99024 POSTOP FOLLOW-UP VISIT: CPT | Mod: POP,,, | Performed by: ORTHOPAEDIC SURGERY

## 2023-03-21 PROCEDURE — 99999 PR PBB SHADOW E&M-EST. PATIENT-LVL III: ICD-10-PCS | Mod: PBBFAC,,, | Performed by: ORTHOPAEDIC SURGERY

## 2023-03-21 PROCEDURE — 73630 X-RAY EXAM OF FOOT: CPT | Mod: TC,PO,LT

## 2023-03-21 PROCEDURE — 73630 X-RAY EXAM OF FOOT: CPT | Mod: 26,LT,, | Performed by: RADIOLOGY

## 2023-03-21 NOTE — PROGRESS NOTES
Status/Diagnosis: Left hallux valgus with crossover toe deformity; 2nd MTP dorsal dislocation; 2nd metatarsalgia; rigid 2nd hammertoe  Date of Surgery: 02/01/2023  Date of Injury: none  Return visit: 4/25/2023   X-rays on Return: WB 3-views Left foot XOP.    Present History:  Gricelda Hassan is a 72 y.o. female who returns today for routine postoperative visit overall doing well.  Reports compliance with nonweightbearing status.  Only complaints today regard intermittent pins and needle sensation involving the big toe.  Also with some degree of rubbing along the dorsum of the midfoot with initiation of tall boot wear and unrelated discoloration of the foot when not elevated.  Reports it turns black/purple however no pain related to this.  Denies any drainage, fever, chills.      Physical exam:  There were no vitals filed for this visit.  There is no height or weight on file to calculate BMI.  General: In no apparent distress; well developed and well nourished.  HEENT: normocephalic; atraumatic.  Cardiovascular: regular rate.  Respiratory: no increased work of breathing.  Musculoskeletal:   Gait: unable to assess.  Inspection:  Surgical sites are well healed.  Some degree of residual eschar the medial midfoot and dorsal forefoot incisions.  After removal of 2nd MTP superficial eschar healthy granulation tissue is present.  No deep extension.  No signs or symptoms of infection.  Overall alignment well-maintained.  No tenderness at the great toe proximal phalanx with deep palpation.  Patient continues to wear the bunion gel insert. Still with slightly altered sensation along the dorsum of the great toe, otherwise gross motor and sensory function intact.  Palpable pedal pulse.     Imaging Studies/Outside documentation:  I have ordered/reviewed/interpreted the following images/outside documentation:  NWB 3-views Left foot: Status post hallux valgus correction with 1st TMT joint fusion and proximal phalangeal  osteotomy.  Also with 2nd MTP relocation and hammertoe correction with percutaneous pin in place.  Overall alignment well-maintained.  No evidence of implant loosening or failure.  Interval bony bridging present at the 1st TMT fusion site.  Callus formation at the proximal phalangeal osteotomy.  No amalia bony union at the 2nd hammertoe correction site.     Assessment:  Gricelda aHssan is a 72 y.o. female with Left hallux valgus with crossover toe deformity; 2nd MTP dorsal dislocation; 2nd metatarsalgia; rigid 2nd hammertoe.     Plan:   Clinical and radiographic findings were discussed.  We will initiate heel weight-bearing only for the remainder of this week.  Starting next week, patient will initiate formal physical therapy for initiation of weight-bearing.  50% weight-bearing week 1; 75% week to; 100% week 3-all while in the boot.  Patient to continue bunion gel spacer until full weight-bearing in the boot.  We will perform 2nd toe taping every other day for the next week.  Showers okay.  No baths or submersion.  Patient reported they are going on a trip to [place] for 2 weeks from 04/11 to 4/24.  We will see her back in my clinic on 04/25 for repeat evaluation and x-ray.  Possible discontinuation of boot wear at that time.        This note was created using voice recognition software and may contain grammatical errors.

## 2023-03-24 ENCOUNTER — PATIENT MESSAGE (OUTPATIENT)
Dept: ORTHOPEDICS | Facility: CLINIC | Age: 73
End: 2023-03-24
Payer: MEDICARE

## 2023-03-27 ENCOUNTER — PATIENT MESSAGE (OUTPATIENT)
Dept: ORTHOPEDICS | Facility: CLINIC | Age: 73
End: 2023-03-27
Payer: MEDICARE

## 2023-03-28 ENCOUNTER — CLINICAL SUPPORT (OUTPATIENT)
Dept: REHABILITATION | Facility: HOSPITAL | Age: 73
End: 2023-03-28
Attending: ORTHOPAEDIC SURGERY
Payer: MEDICARE

## 2023-03-28 DIAGNOSIS — M20.12 HALLUX VALGUS WITH BUNIONS OF LEFT FOOT: ICD-10-CM

## 2023-03-28 DIAGNOSIS — S93.129A CLOSED DISLOCATION OF METATARSOPHALANGEAL (JOINT), INITIAL ENCOUNTER: ICD-10-CM

## 2023-03-28 DIAGNOSIS — M79.672 LEFT FOOT PAIN: ICD-10-CM

## 2023-03-28 DIAGNOSIS — M21.612 HALLUX VALGUS WITH BUNIONS OF LEFT FOOT: ICD-10-CM

## 2023-03-28 PROCEDURE — 97161 PT EVAL LOW COMPLEX 20 MIN: CPT | Mod: PO

## 2023-03-28 PROCEDURE — 97110 THERAPEUTIC EXERCISES: CPT | Mod: PO

## 2023-03-28 PROCEDURE — 97116 GAIT TRAINING THERAPY: CPT | Mod: PO

## 2023-03-28 PROCEDURE — 97140 MANUAL THERAPY 1/> REGIONS: CPT | Mod: PO

## 2023-03-28 NOTE — PROGRESS NOTES
[facility]  Physical Therapy Initial Evaluation    Name: Gricelda Hassan  Clinic Number: [id number]    Therapy Diagnosis:   Encounter Diagnoses   Name Primary?    Hallux valgus with bunions of left foot     Closed dislocation of metatarsophalangeal (joint), initial encounter     Left foot pain      Physician: Javier Hammonds MD    Physician Orders: PT Eval and Treat  Medical Diagnosis from Referral: Hallux valgus with bunions of left foot [M20.12, M21.612], Closed dislocation of metatarsophalangeal (joint), initial encounter [S93.129A]  Evaluation Date: 3/28/2023  Authorization Period Expiration: 3/21/2023 - 3/20/2024  Plan of Care Expiration: 6-20-23  Visit # / Visits authorized: 1/ 1    Time In: 1400  Time Out: 1500  Total Billable Time: 60 minutes    Precautions: Standard    Subjective   Date of onset: DOS: 2-1-23  History of current condition - Gricelda reports: that she had bunionectomy on 2-1-23. States it was a 3 hour surgery. States that she lost her balance a few times at home. States it has been a smooth recovery thus far. States that touch medial foot hurts and says MD said it will get better in time. She wants to walk 4-11-23 without the walker.        Past Medical History:   Diagnosis Date    Atrial tachycardia     Hyperlipidemia     Hypertension     Ischemic colitis      Gricelda Hassan  has a past surgical history that includes Tubal ligation; Lapidus bunionectomy (Left, 2/1/2023); open treatment, dislocation, mtp joint (Left, 2/1/2023); Correction of hammer toe (Left, 2/1/2023); and Surgical removal of metatarsal head (Left, 2/1/2023).    Gricelda has a current medication list which includes the following prescription(s): aspirin, atorvastatin, calcium carbonate, cyanocobalamin, gabapentin, garlic, multivitamin, quinapril, and vitamin d, and the following Facility-Administered Medications: sodium chloride 0.9%.    Review of patient's allergies indicates:   Allergen  Reactions    Adhesive tape-silicones Other (See Comments)    Penicillins Rash     Yeast infection          Imaging,       Prior Therapy: yes for back   Social History:  lives with their family  Prior Level of Function: Pt was walking 3-4 miles and 10-15 miles biking, hand weights for UE, and core exercises for back   Current Level of Function: rolling walker in CAM boot     Pain:  Current 0/10, worst 7/10, best 0/10   Location: left medial foot   Description: pins and needles      Objective     Observation: RW walker, incision on 1st met and 2nd met, toe spacer, tape on 2nd toe    Posture: antalgic gait pattern, no mid stance or terminal stance noted in boot    Active Range of Motion:   Ankle Right Left   DF (knee extended) 3 Left 3   Plantarflexion 55 50   Inversion 38 30   Eversion 15 10      Passive Range of Motion:   Ankle Right Left   DF (knee extended) 5 0   Plantarflexion 60 55   Inversion 40 30   Eversion 15 10       Strength:  Ankle Right Left   Dorsiflexion 5 4   Plantarflexion 4 NT   Inversion 4+ 4-   Eversion 4+ 4-     Knee Right Left   Active 5-0-140 5-0-140   Passive 5-0-140 5-0-140       Joint Mobility: hypomobile TC jt    Palpation: tender to superficial palpation of incisions over 1st met     Sensation: decreased over 1st MTP        CMS Impairment/Limitation/Restriction for FOTO mobility  Survey    Therapist reviewed FOTO scores for Gricelda Hassan on 3/28/2023.   FOTO documents entered into [employer] - see Media section.    Limitation Score: 36%  Category: Mobility         TREATMENT     Total Treatment time separate from Evaluation: 40 minutes    Gricelda received therapeutic exercises for 18 minutes including:  Pt edu on HEP, POC, PT eval findings, biomechanics, precautions, safety with ADL   Seated towel slides  - IV, EV, DF  Seated heel slides     Gricelda received the following manual therapy techniques:  for 12 minutes, including:  PROM IV EV DF PF  TC jt for DF Gr 3-4  Lateral sub talar mob GR  3-4   Desensitization of dorsal surface      Gricelda participated in gait training to improve functional mobility and safety for 10  minutes, including:  Heel contact to mid stance in walker in boot   Mid stance to terminal stance in walker in boot  Walking with RW cueing      Home Exercises and Patient Education Provided    Education provided re: precautions, PT eval findings, biomechanics, safety with ADLs, form with activities, POC, goals for therapy, role of therapy for care, exercises/HEP    Written Home Exercises Provided: yes  Exercises were reviewed and Gricelda was able to demonstrate them prior to the end of the session.   Pt received a written copy of exercises to perform at home. Gricelda demonstrated good understanding of the education provided.     See EMR under patient instructions for exercises given.   Assessment   Gricelda is a 72 y.o. female referred to outpatient Physical Therapy with a medical diagnosis of Hallux valgus with bunions of left foot [M20.12, M21.612], Closed dislocation of metatarsophalangeal (joint), initial encounter [S93.129A]. Pt presents with s/p 2-1-23 from above procedure. Pt has deficits in decreased range of motion, increased edema, decreased strength and endurance, decreased balance and decrease neuro-muscular control and increased pain. This limits the pts ability to walking, stair navigate, ride bike, chores, getting dressed, showering. Pt is a good candidate for skilled physical therapy treatment to address the above limitations through manual therapy techniques, neuro-reeducation, therapeutic exercise, therapeutic activity, patient education, strength and endurance program, gait training  and balance training    Pt prognosis is Excellent.   Pt will benefit from skilled outpatient Physical Therapy to address the deficits stated above and in the chart below, provide pt/family education, and to maximize pt's level of independence.     Plan of care discussed with patient:  yes  Pt's spiritual, cultural and educational needs considered and patient is agreeable to the plan of care and goals as stated below:     Anticipated Barriers for therapy: none    Medical Necessity is demonstrated by the following  History  Co-morbidities and personal factors that may impact the plan of care Co-morbidities:   none    Personal Factors:   no deficits     low   Examination  Body Structures and Functions, activity limitations and participation restrictions that may impact the plan of care Body Regions:   lower extremities    Body Systems:    gross symmetry  ROM  strength  gross coordinated movement  balance  gait  transfers  motor control  motor learning    Participation Restrictions:   none    Activity limitations:   Learning and applying knowledge  no deficits    General Tasks and Commands  no deficits    Communication  no deficits    Mobility  driving (bike, car, motorcycle)    Self care  washing oneself (bathing, drying, washing hands)  toileting  dressing  looking after one's health    Domestic Life  shopping  cooking  doing house work (cleaning house, washing dishes, laundry)  assisting others    Interactions/Relationships  intimate relationships    Life Areas  no deficits    Community and Social Life  community life  recreation and leisure         low   Clinical Presentation stable and uncomplicated low   Decision Making/ Complexity Score: low     Goals:  Short Term Goals: 2-6 weeks   Pt will be able to demonstrate 0 deg DF and >5 deg improvement in IV/ER/PF.  Pt will be able to demonstrate x10 DL heel raise no pain.  Pt will be able to demonstrate walk in cam boot in pain no AD.  Pt will be able to demonstrate 10 BW squats to chair in boot.  Pt will be able to demonstrate SL stance EO >30s bilat LE.  Pt will be able to demonstrate walking without cam boot with single point cane.      Long Term Goals: 6-12 weeks   Pt will be able to demonstrate full ankle Rom to unaffected side  Pt will be able  to demonstrate stationary biking 10' no pain.  Pt will be able to demonstrate stair navigation 6 steps no pain no cueing.  Pt will subjectively report complete return to all ADLs and recreational activities.     Plan   Plan of care Certification: 3/28/2023 to 6-20-23.    Outpatient Physical Therapy 2 times weekly for 12 weeks to include the following interventions: Electrical Stimulation, Manual Therapy, Moist Heat/ Ice, Neuromuscular Re-ed, Patient Education, Self Care, Therapeutic Activities,  and Therapeutic Exercise    Mell Hennessy, PT, DPT

## 2023-03-31 ENCOUNTER — CLINICAL SUPPORT (OUTPATIENT)
Dept: REHABILITATION | Facility: HOSPITAL | Age: 73
End: 2023-03-31
Attending: ORTHOPAEDIC SURGERY
Payer: MEDICARE

## 2023-03-31 DIAGNOSIS — M79.672 LEFT FOOT PAIN: Primary | ICD-10-CM

## 2023-03-31 PROCEDURE — 97110 THERAPEUTIC EXERCISES: CPT | Mod: PO

## 2023-03-31 PROCEDURE — 97140 MANUAL THERAPY 1/> REGIONS: CPT | Mod: PO

## 2023-03-31 PROCEDURE — 97112 NEUROMUSCULAR REEDUCATION: CPT | Mod: PO

## 2023-03-31 NOTE — PROGRESS NOTES
Physical Therapy Daily Treatment Note     [facility]  Clinic Number: [id number]    Therapy Diagnosis:   Encounter Diagnosis   Name Primary?    Left foot pain Yes     Physician: Javier Hammonds MD    Visit Date: 3/31/2023  Physician Orders: PT Eval and Treat  Medical Diagnosis from Referral: Hallux valgus with bunions of left foot [M20.12, M21.612], Closed dislocation of metatarsophalangeal (joint), initial encounter [S93.129A]  Evaluation Date: 3/28/2023  Authorization Period Expiration: 3/21/2023 - 3/20/2024  Plan of Care Expiration: 6-20-23  Visit # / Visits authorized: 1/ 1    FOTO 1st:  FOTO 5th:  FOTO 10th:     Time In: 1510  Time Out: 1620  Total Billable Time: 70 minutes    Precautions: Standard    Subjective     Pt reports: states she has been walking a lot and her foot is sore.  She was compliant with home exercise program.  Response to previous treatment: progressing  Functional change: improved waling    Pain: 3/10  Location: left ankles and feet      Objective         Treatment       Gricelda received therapeutic exercises to develop for 38 minutes including:  Don doff boot   Seated Towel slides with DF over pressure   Seated Towel slides IV/EV  Seated heel raise x10, 2x 10 10s 5#  SLR x 20 2x 15 1#  Clam 3x 20  Sidelying hip ABD 4x5  Knee ext machine 3x 10  HS machine 3x 10         Gricelda received the following manual therapy techniques:  for 16 minutes, including:  Gtreat toe ext  1st met mob   PROM ankle IV/EV/DF/PF  Gr 3 TC A to P mob for DF  - Gr 3 mob  - 2x10 MWM  Gr 3 sub-talar lateral for EV  - Gr 3 mob  - 2x10 MWM    Gricelda participated in neuromuscular re-education or 16 minutes. The following activities were included:  Patient education saggitall plane and front plane pelvic control   Pt edu on glute med   Foot dome  x20   Foot doming with seated towel slide   Foot doming with minimal over pressure     Gricelda participated in dynamic functional therapeutic activities to  improve functional performance for 00  minutes, including:      Gricelda participated in gait training to improve functional mobility and safety for 00  minutes, including:      Home Exercises Provided and Patient Education Provided     Education provided:   - edu on internvetions     Written Home Exercises Provided: Patient instructed to cont prior HEP.  Exercises were reviewed and Gricelda was able to demonstrate them prior to the end of the session.  Gricelda demonstrated good  understanding of the education provided.     See EMR under Patient Instructions for exercises provided prior visit.    Assessment      Pt presents with fair carryover in Range of Motion. Continued with manual therapy techniques and neuro-muscular control of foot doming tolerated this well. Added [place] hip strengthening due to LE sore after neuro- reeducation . Needed cueing to promote hip position and glute med activation. Added [place] strengthening on machines HS and quad with good tolerance       Gricelda Is progressing well towards her goals.   Pt prognosis is Excellent.     Pt will continue to benefit from skilled outpatient physical therapy to address the deficits listed in the problem list box on initial evaluation, provide pt/family education and to maximize pt's level of independence in the home and community environment.     Pt's spiritual, cultural and educational needs considered and pt agreeable to plan of care and goals.    Anticipated barriers to physical therapy: none    Goals:  Short Term Goals: 2-6 weeks   Pt will be able to demonstrate 0 deg DF and >5 deg improvement in IV/ER/PF.  Pt will be able to demonstrate x10 DL heel raise no pain.  Pt will be able to demonstrate walk in cam boot in pain no AD.  Pt will be able to demonstrate 10 BW squats to chair in boot.  Pt will be able to demonstrate SL stance EO >30s bilat LE.  Pt will be able to demonstrate walking without cam boot with single point cane.        Long Term Goals:  6-12 weeks   Pt will be able to demonstrate full ankle Rom to unaffected side  Pt will be able to demonstrate stationary biking 10' no pain.  Pt will be able to demonstrate stair navigation 6 steps no pain no cueing.  Pt will subjectively report complete return to all ADLs and recreational activities.    Plan     Improve ankle/foot mobiity, improve neuro-muscular control of ankle/foot, pattern gait and functional activity for PLOF.     Mell Hennessy, PT, DPT

## 2023-04-02 ENCOUNTER — PATIENT MESSAGE (OUTPATIENT)
Dept: ORTHOPEDICS | Facility: CLINIC | Age: 73
End: 2023-04-02
Payer: MEDICARE

## 2023-04-03 DIAGNOSIS — M20.12 HALLUX VALGUS WITH BUNIONS OF LEFT FOOT: Primary | ICD-10-CM

## 2023-04-03 DIAGNOSIS — M21.612 HALLUX VALGUS WITH BUNIONS OF LEFT FOOT: Primary | ICD-10-CM

## 2023-04-03 RX ORDER — TRAMADOL HYDROCHLORIDE 50 MG/1
50 TABLET ORAL EVERY 8 HOURS PRN
Qty: 21 TABLET | Refills: 0 | Status: SHIPPED | OUTPATIENT
Start: 2023-04-03 | End: 2023-04-10

## 2023-04-04 ENCOUNTER — CLINICAL SUPPORT (OUTPATIENT)
Dept: REHABILITATION | Facility: HOSPITAL | Age: 73
End: 2023-04-04
Attending: ORTHOPAEDIC SURGERY
Payer: MEDICARE

## 2023-04-04 DIAGNOSIS — M79.672 LEFT FOOT PAIN: Primary | ICD-10-CM

## 2023-04-04 PROCEDURE — 97140 MANUAL THERAPY 1/> REGIONS: CPT | Mod: PO

## 2023-04-04 PROCEDURE — 97110 THERAPEUTIC EXERCISES: CPT | Mod: PO

## 2023-04-04 PROCEDURE — 97112 NEUROMUSCULAR REEDUCATION: CPT | Mod: PO

## 2023-04-04 NOTE — PROGRESS NOTES
Physical Therapy Daily Treatment Note     Name: Gricelda Hassan  Clinic Number: [id number]    Therapy Diagnosis:   Encounter Diagnosis   Name Primary?    Left foot pain Yes     Physician: Javier Hammonds MD    Visit Date: 4/4/2023  Physician Orders: PT Eval and Treat  Medical Diagnosis from Referral: Hallux valgus with bunions of left foot [M20.12, M21.612], Closed dislocation of metatarsophalangeal (joint), initial encounter [S93.129A]  Evaluation Date: 3/28/2023  Authorization Period Expiration: 3/21/2023 - 3/20/2024  Plan of Care Expiration: 6-20-23  Visit # / Visits authorized: 1/ 1    FOTO 1st:  FOTO 5th:  FOTO 10th:     Time In: 1400  Time Out: 1500  Total Billable Time: 58 minutes    Precautions: Standard    Subjective     Pt reports: states she has been walking a lot and her foot is sore.  She was compliant with home exercise program.  Response to previous treatment: progressing  Functional change: improved waling    Pain: 3/10  Location: left ankles and feet      Objective         Treatment       Gricelda received therapeutic exercises to develop for 30 minutes including:  Don doff boot   DF 3x10  3S Blue Theraband   EV 3x10  3S Blue Theraband   Seated heel raise 2x 10 10s 5#  Clam 3x 10 Yellow Theraband bilat   Knee ext machine 3x 10  HS machine 3x 10     Not today           Gricelda received the following manual therapy techniques:  for 12 minutes, including:  Gtreat toe ext  1st met mob   PROM ankle IV/EV/DF/PF  Gr 3 TC A to P mob for DF  - Gr 3 mob  - 2x10 MWM  Gr 3 sub-talar lateral for EV  - Gr 3 mob  - 2x10 MWM    Gricelda participated in neuromuscular re-education or 16 minutes. The following activities were included:  Patient education saggitall plane and front plane pelvic control   Pt edu on glute med   BAPs board lvl 2  - PF/DF 2x20   - EV/EV 2x20   - CW/CCW 2x 20   Foot doming with minimal over pressure   Pt edu on gait     Gricelda participated in dynamic functional therapeutic activities  to improve functional performance for 00  minutes, including:      Gricelda participated in gait training to improve functional mobility and safety for 00  minutes, including:      Home Exercises Provided and Patient Education Provided     Education provided:   - edu on internvetions     Written Home Exercises Provided: Patient instructed to cont prior HEP.  Exercises were reviewed and Gricelda was able to demonstrate them prior to the end of the session.  Gricelda demonstrated good  understanding of the education provided.     See EMR under Patient Instructions for exercises provided prior visit.    Assessment   Tolerated treatment well today. Needed cueing with BAPs board. Continues to need skilled PT to address, mobility, balance training and gait training . Will continue to progress walking and SL balance training next visit.       Gricelda Is progressing well towards her goals.   Pt prognosis is Excellent.     Pt will continue to benefit from skilled outpatient physical therapy to address the deficits listed in the problem list box on initial evaluation, provide pt/family education and to maximize pt's level of independence in the home and community environment.     Pt's spiritual, cultural and educational needs considered and pt agreeable to plan of care and goals.    Anticipated barriers to physical therapy: none    Goals:  Short Term Goals: 2-6 weeks   Pt will be able to demonstrate 0 deg DF and >5 deg improvement in IV/ER/PF.  Pt will be able to demonstrate x10 DL heel raise no pain.  Pt will be able to demonstrate walk in cam boot in pain no AD.  Pt will be able to demonstrate 10 BW squats to chair in boot.  Pt will be able to demonstrate SL stance EO >30s bilat LE.  Pt will be able to demonstrate walking without cam boot with single point cane.        Long Term Goals: 6-12 weeks   Pt will be able to demonstrate full ankle Rom to unaffected side  Pt will be able to demonstrate stationary biking 10' no  pain.  Pt will be able to demonstrate stair navigation 6 steps no pain no cueing.  Pt will subjectively report complete return to all ADLs and recreational activities.    Plan     Improve ankle/foot mobiity, improve neuro-muscular control of ankle/foot, pattern gait and functional activity for PLOF.     Mell Hennessy, PT, DPT

## 2023-04-06 ENCOUNTER — CLINICAL SUPPORT (OUTPATIENT)
Dept: REHABILITATION | Facility: HOSPITAL | Age: 73
End: 2023-04-06
Attending: ORTHOPAEDIC SURGERY
Payer: MEDICARE

## 2023-04-06 DIAGNOSIS — M79.672 LEFT FOOT PAIN: Primary | ICD-10-CM

## 2023-04-06 PROCEDURE — 97112 NEUROMUSCULAR REEDUCATION: CPT | Mod: PO

## 2023-04-06 PROCEDURE — 97140 MANUAL THERAPY 1/> REGIONS: CPT | Mod: PO

## 2023-04-06 PROCEDURE — 97110 THERAPEUTIC EXERCISES: CPT | Mod: PO

## 2023-04-06 NOTE — PROGRESS NOTES
"  Physical Therapy Daily Treatment Note     Name: Gricelda Hassan  Clinic Number: [id number]    Therapy Diagnosis:   Encounter Diagnosis   Name Primary?    Left foot pain Yes     Physician: Javier Hammonds MD    Visit Date: 4/6/2023  Physician Orders: PT Eval and Treat  Medical Diagnosis from Referral: Hallux valgus with bunions of left foot [M20.12, M21.612], Closed dislocation of metatarsophalangeal (joint), initial encounter [S93.129A]  Evaluation Date: 3/28/2023  Authorization Period Expiration: 3/21/2023 - 3/20/2024  Plan of Care Expiration: 6-20-23  Visit # / Visits authorized: 3/20    FOTO 1st:  FOTO 5th:  FOTO 10th:     Time In: 1250  Time Out: 1350  Total Billable Time: 60 minutes    Precautions: Standard    Subjective     Pt reports: she feels great today with decreased soreness compared to usual.   She was compliant with home exercise program.  Response to previous treatment: progressing  Functional change: improved waling    Pain: 3/10  Location: left ankles and feet      Objective         Treatment       Gricelda received therapeutic exercises to develop for 30 minutes including:  Tibial nerve glide x15   DF 3x10  3S Blue Theraband   EV 3x10  3S Blue Theraband   Seated heel raise 3x10 10s 8#  DL bridge 3x10   Clam 3x 10 green Theraband bilat   DL shuttle squat 3x10 #    Gricelda received the following manual therapy techniques:  for 15 minutes, including:  Gtreat toe ext  1st met mob   PROM ankle IV/EV/DF/PF  Gr 3 TC A to P mob for DF  - Gr 3 mob  - 2x10 MWM  Gr 3 sub-talar lateral for EV  - Gr 3 mob  - 2x10 MWM    Gricelda participated in neuromuscular re-education or 15 minutes. The following activities were included:  Toe yoga 2x10 ea   Foot doming 20x5"   Dead bug heel tap 2x10 ea     Gricelda participated in dynamic functional therapeutic activities to improve functional performance for 00  minutes, including:      Gricelda participated in gait training to improve functional mobility and " safety for 00  minutes, including:      Home Exercises Provided and Patient Education Provided     Education provided:   - edu on internvetions     Written Home Exercises Provided: Patient instructed to cont prior HEP.  Exercises were reviewed and Gricelda was able to demonstrate them prior to the end of the session.  Gricelda demonstrated good  understanding of the education provided.     See EMR under Patient Instructions for exercises provided prior visit.    Assessment   Good tolerance for treatment session with continued work on ankle and foot mobility. Educated pt on nerve glide for posterior foot pain. Reported decreased sensitivity with ending repetitions. Able to perform LE strengthening with appropriate mechanics. Difficulty with foot intrinsic activation but able to perform with cueing. Will continue to progress as tolerated.       Gricelda Is progressing well towards her goals.   Pt prognosis is Excellent.     Pt will continue to benefit from skilled outpatient physical therapy to address the deficits listed in the problem list box on initial evaluation, provide pt/family education and to maximize pt's level of independence in the home and community environment.     Pt's spiritual, cultural and educational needs considered and pt agreeable to plan of care and goals.    Anticipated barriers to physical therapy: none    Goals:  Short Term Goals: 2-6 weeks   Pt will be able to demonstrate 0 deg DF and >5 deg improvement in IV/ER/PF.  Pt will be able to demonstrate x10 DL heel raise no pain.  Pt will be able to demonstrate walk in cam boot in pain no AD.  Pt will be able to demonstrate 10 BW squats to chair in boot.  Pt will be able to demonstrate SL stance EO >30s bilat LE.  Pt will be able to demonstrate walking without cam boot with single point cane.        Long Term Goals: 6-12 weeks   Pt will be able to demonstrate full ankle Rom to unaffected side  Pt will be able to demonstrate stationary biking 10'  no pain.  Pt will be able to demonstrate stair navigation 6 steps no pain no cueing.  Pt will subjectively report complete return to all ADLs and recreational activities.    Plan     Improve ankle/foot mobiity, improve neuro-muscular control of ankle/foot, pattern gait and functional activity for PLOF.     Mell Hennessy, PT, DPT

## 2023-04-10 ENCOUNTER — CLINICAL SUPPORT (OUTPATIENT)
Dept: REHABILITATION | Facility: HOSPITAL | Age: 73
End: 2023-04-10
Attending: ORTHOPAEDIC SURGERY
Payer: MEDICARE

## 2023-04-10 DIAGNOSIS — M79.672 LEFT FOOT PAIN: Primary | ICD-10-CM

## 2023-04-10 PROCEDURE — 97110 THERAPEUTIC EXERCISES: CPT | Mod: PO

## 2023-04-10 PROCEDURE — 97140 MANUAL THERAPY 1/> REGIONS: CPT | Mod: PO

## 2023-04-10 PROCEDURE — 97112 NEUROMUSCULAR REEDUCATION: CPT | Mod: PO

## 2023-04-10 PROCEDURE — 97116 GAIT TRAINING THERAPY: CPT | Mod: PO

## 2023-04-10 NOTE — PROGRESS NOTES
Physical Therapy Daily Treatment Note     [facility]  Clinic Number: [id number]    Therapy Diagnosis:   Encounter Diagnosis   Name Primary?    Left foot pain Yes     Physician: Javier Hammonds MD    Visit Date: 4/10/2023  Physician Orders: PT Eval and Treat  Medical Diagnosis from Referral: Hallux valgus with bunions of left foot [M20.12, M21.612], Closed dislocation of metatarsophalangeal (joint), initial encounter [S93.129A]  Evaluation Date: 3/28/2023  Authorization Period Expiration: 3/21/2023 - 3/20/2024  Plan of Care Expiration: 6-20-23  Visit # / Visits authorized: 3/20    FOTO 1st:  FOTO 5th:  FOTO 10th:     Time In: 0657  Time Out: 0800  Total Billable Time: 56 minutes    Precautions: Standard    Subjective     Pt reports: she feels great today with decreased soreness compared to usual.   She was compliant with home exercise program.  Response to previous treatment: progressing  Functional change: improved waling    Pain: 3/10  Location: left ankles and feet      Objective         Treatment       Gricelda received therapeutic exercises to develop for 8 minutes including:  DF 3x10  3S Blue Theraband   EV 3x10  3S Blue Theraband     Not today  Tibial nerve glide x15   DL bridge 3x10   Clam 3x 10 green Theraband bilat   DL shuttle squat 3x10 #    Gricelda received the following manual therapy techniques:  for 16 minutes, including:  Gtreat toe ext  1st met mob   PROM ankle IV/EV/DF/PF  Gr 3 TC A to P mob for DF  - Gr 3 mob  - 2x10 MWM  Gr 3 sub-talar lateral for EV  - Gr 3 mob  - 2x10 MWM    Gricelda participated in neuromuscular re-education or 14 minutes. The following activities were included:  Lateral wt shift  Foot doming 2x 1'  Fwd/bwd wt shift Foot doming 2x 1'  DL heel raise 4x5     Not today  Toe yoga 2x10 ea   Dead bug heel tap 2x10 ea     Gricelda participated in dynamic functional therapeutic activities to improve functional performance for 00  minutes,  including:      Gricelda participated in gait training to improve functional mobility and safety for 18 minutes, including:  Pt edu on gait and form   Heel contact to mid stance 2x20  Mid stance to terminal stance 2x20  Terminal stance to heel contact 2x20  Gait cycle with cueing at table 4x10      Home Exercises Provided and Patient Education Provided     Education provided:   - edu on internvetions     Written Home Exercises Provided: Patient instructed to cont prior HEP.  Exercises were reviewed and Gricelda was able to demonstrate them prior to the end of the session.  Gricelda demonstrated good  understanding of the education provided.     See EMR under Patient Instructions for exercises provided prior visit.    Assessment   Pt presents with good carryover in Range of Motion since last visit. Treatment progresses with DL WB activities with no boot and transition into gait training  with no AD or boot. Pt tolerated this well within session. Needed cueing to promote foot dome posture for wt acceptance and moderate cueing during gait training  for form. Pt had 5/10 pain during the last set of stance phase for gait training , this decreases with rest. Continue with gait training  and WB graded exposure next visit.       Gricelda Is progressing well towards her goals.   Pt prognosis is Excellent.     Pt will continue to benefit from skilled outpatient physical therapy to address the deficits listed in the problem list box on initial evaluation, provide pt/family education and to maximize pt's level of independence in the home and community environment.     Pt's spiritual, cultural and educational needs considered and pt agreeable to plan of care and goals.    Anticipated barriers to physical therapy: none    Goals:  Short Term Goals: 2-6 weeks   Pt will be able to demonstrate 0 deg DF and >5 deg improvement in IV/ER/PF.  Pt will be able to demonstrate x10 DL heel raise no pain.  Pt will be able to demonstrate walk  in cam boot in pain no AD.  Pt will be able to demonstrate 10 BW squats to chair in boot.  Pt will be able to demonstrate SL stance EO >30s bilat LE.  Pt will be able to demonstrate walking without cam boot with single point cane.        Long Term Goals: 6-12 weeks   Pt will be able to demonstrate full ankle Rom to unaffected side  Pt will be able to demonstrate stationary biking 10' no pain.  Pt will be able to demonstrate stair navigation 6 steps no pain no cueing.  Pt will subjectively report complete return to all ADLs and recreational activities.    Plan     Improve ankle/foot mobiity, improve neuro-muscular control of ankle/foot, pattern gait and functional activity for PLOF.     Mell Hennessy, PT, DPT

## 2023-04-21 DIAGNOSIS — M21.612 HALLUX VALGUS WITH BUNIONS OF LEFT FOOT: Primary | ICD-10-CM

## 2023-04-21 DIAGNOSIS — M20.12 HALLUX VALGUS WITH BUNIONS OF LEFT FOOT: Primary | ICD-10-CM

## 2023-04-25 ENCOUNTER — OFFICE VISIT (OUTPATIENT)
Dept: ORTHOPEDICS | Facility: CLINIC | Age: 73
End: 2023-04-25
Payer: MEDICARE

## 2023-04-25 ENCOUNTER — HOSPITAL ENCOUNTER (OUTPATIENT)
Dept: RADIOLOGY | Facility: HOSPITAL | Age: 73
Discharge: HOME | End: 2023-04-25
Attending: ORTHOPAEDIC SURGERY
Payer: MEDICARE

## 2023-04-25 ENCOUNTER — CLINICAL SUPPORT (OUTPATIENT)
Dept: REHABILITATION | Facility: HOSPITAL | Age: 73
End: 2023-04-25
Attending: ORTHOPAEDIC SURGERY
Payer: MEDICARE

## 2023-04-25 DIAGNOSIS — M79.672 LEFT FOOT PAIN: Primary | ICD-10-CM

## 2023-04-25 DIAGNOSIS — M20.42 HAMMERTOE OF SECOND TOE OF LEFT FOOT: ICD-10-CM

## 2023-04-25 DIAGNOSIS — M21.612 HALLUX VALGUS WITH BUNIONS OF LEFT FOOT: ICD-10-CM

## 2023-04-25 DIAGNOSIS — S93.129A CLOSED DISLOCATION OF METATARSOPHALANGEAL (JOINT), INITIAL ENCOUNTER: ICD-10-CM

## 2023-04-25 DIAGNOSIS — M20.12 HALLUX VALGUS WITH BUNIONS OF LEFT FOOT: Primary | ICD-10-CM

## 2023-04-25 DIAGNOSIS — M20.12 HALLUX VALGUS WITH BUNIONS OF LEFT FOOT: ICD-10-CM

## 2023-04-25 DIAGNOSIS — M21.612 HALLUX VALGUS WITH BUNIONS OF LEFT FOOT: Primary | ICD-10-CM

## 2023-04-25 PROCEDURE — 73630 XR FOOT COMPLETE 3 VIEW LEFT: ICD-10-PCS | Mod: 26,LT,, | Performed by: RADIOLOGY

## 2023-04-25 PROCEDURE — 99024 POSTOP FOLLOW-UP VISIT: CPT | Mod: POP,,, | Performed by: ORTHOPAEDIC SURGERY

## 2023-04-25 PROCEDURE — 73630 X-RAY EXAM OF FOOT: CPT | Mod: TC,PO,LT

## 2023-04-25 PROCEDURE — 97112 NEUROMUSCULAR REEDUCATION: CPT | Mod: PO

## 2023-04-25 PROCEDURE — 99999 PR PBB SHADOW E&M-EST. PATIENT-LVL II: CPT | Mod: PBBFAC,,, | Performed by: ORTHOPAEDIC SURGERY

## 2023-04-25 PROCEDURE — 99999 PR PBB SHADOW E&M-EST. PATIENT-LVL II: ICD-10-PCS | Mod: PBBFAC,,, | Performed by: ORTHOPAEDIC SURGERY

## 2023-04-25 PROCEDURE — 73630 X-RAY EXAM OF FOOT: CPT | Mod: 26,LT,, | Performed by: RADIOLOGY

## 2023-04-25 PROCEDURE — 99212 OFFICE O/P EST SF 10 MIN: CPT | Mod: PBBFAC,PN | Performed by: ORTHOPAEDIC SURGERY

## 2023-04-25 PROCEDURE — 99024 PR POST-OP FOLLOW-UP VISIT: ICD-10-PCS | Mod: POP,,, | Performed by: ORTHOPAEDIC SURGERY

## 2023-04-25 PROCEDURE — 97110 THERAPEUTIC EXERCISES: CPT | Mod: PO

## 2023-04-25 NOTE — PROGRESS NOTES
Physical Therapy Daily Treatment Note     [facility]  Cl[facility] Number: [id number]    Therapy Diagnosis:   Encounter Diagnosis   Name Primary?    Left foot pain Yes     Physician: Javier Hammonds MD    Visit Date: 4/25/2023  Physician Orders: PT Eval and Treat  Medical Diagnosis from Referral: Hallux valgus with bunions of left foot [M20.12, M21.612], Closed dislocation of metatarsophalangeal (joint), initial encounter [S93.129A]  Evaluation Date: 3/28/2023  Authorization Period Expiration: 3/21/2023 - 3/20/2024  Plan of Care Expiration: 6-20-23  Visit # / Visits authorized: 3/20    FOTO 1st: 36%   FOTO 6th: 62%  FOTO 10th:     Time In: 1300  Time Out: 1400  Total Billable Time: 55 minutes    Precautions: Standard    Subjective     Pt reports: no pain. Completed HEP 1x a day on trip except 2 days. MD d/c her CAM boot and allowed a walking surgery shoe. She states she has been doing good.     She was compliant with home exercise program.  Response to previous treatment: progressing  Functional change: improved waling    Pain: 0/10  Location: left ankles and feet      Objective     Gait:  quad avoidant gait, increase WB on RLE    Passive Range of Motion:   Ankle Right Left   DF (knee extended) 5 5   Plantarflexion 60 65   Inversion 40 40   Eversion   15 10     Joint Mobility: hypomobile TC jt     Palpation: tender to superficial palpation of incisions over 1st met      Sensation: decreased over 1st MTP    SL stance:   EO: Left:  58s LOB and increase sway during, Right:  >60s   EC: NT due to lack balance     SL heel raise:  Right: 25  Left: 1= too painful     SL leg press 30#  Right: 20  Left:  20     Treatment       Gricelda received therapeutic exercises to develop for 33 minutes including:  Reassessments   SL heel raise shuttle 410 12.5# 4x 15 25#  SL leg press x10 20# 2x 10 30#, 1x till fatigue         Not today  Tibial nerve glide x15   DL bridge 3x10   Clam 3x 10 green Theraband bilat   DL shuttle  squat 3x10 #    Gricelda received the following manual therapy techniques:  for 9 minutes, including:  Gtreat toe ext  1st met mob   Gr 3 sub-talar lateral for EV  - Gr 3 mob  - 2x10 MWM    Gricelda participated in neuromuscular re-education or  13 minutes. The following activities were included:  SL stance EC with UE balance support 5x 30s bilat   DL heel raise 4x10   SL heel raise on shuttle x5s 10s     Not today  Toe yoga 2x10 ea   Dead bug heel tap 2x10 ea     Gricelda participated in dynamic functional therapeutic activities to improve functional performance for 00  minutes, including:      Gricelda participated in gait training to improve functional mobility and safety for 00 minutes, including:    Not today  Pt edu on gait and form in surgery walking shoe   Heel contact to mid stance 2x20  Mid stance to terminal stance 2x20  Terminal stance to heel contact 2x20  Gait cycle with cueing at table 4x10      Home Exercises Provided and Patient Education Provided     Education provided:   - edu on internvetions     Written Home Exercises Provided: Patient instructed to cont prior HEP.  Exercises were reviewed and Gricelda was able to demonstrate them prior to the end of the session.  Gricelda demonstrated good  understanding of the education provided.     See EMR under Patient Instructions for exercises provided prior visit.    Assessment   Pt  presents to 6th visit and reassessment with improvements in Range of Motion and gait without RW.  Range of Motion is great. Deficits remain in strength and SL neuro-muscular control. This is evident in SL stance with increase sway and poor neuro-muscular control of foot intrinsics. PT demonstrates pain with heel raise, this decreases with shuttle press heel raise. Continue to progress as tolerated. Care is still necessary to pattern gait without walking shoe, improving strength and improving balance training .       Gricelda Is progressing well towards her goals.   Pt  prognosis is Excellent.     Pt will continue to benefit from skilled outpatient physical therapy to address the deficits listed in the problem list box on initial evaluation, provide pt/family education and to maximize pt's level of independence in the home and community environment.     Pt's spiritual, cultural and educational needs considered and pt agreeable to plan of care and goals.    Anticipated barriers to physical therapy: none    Goals:  Short Term Goals: 2-6 weeks   Pt will be able to demonstrate 0 deg DF and >5 deg improvement in IV/ER/PF.  Pt will be able to demonstrate x10 DL heel raise no pain.  Pt will be able to demonstrate walk in cam boot in pain no AD.  Pt will be able to demonstrate 10 BW squats to chair in boot.  Pt will be able to demonstrate SL stance EO >30s bilat LE.  Pt will be able to demonstrate walking without cam boot with single point cane.        Long Term Goals: 6-12 weeks   Pt will be able to demonstrate full ankle Rom to unaffected side  Pt will be able to demonstrate stationary biking 10' no pain.  Pt will be able to demonstrate stair navigation 6 steps no pain no cueing.  Pt will subjectively report complete return to all ADLs and recreational activities.    Plan     Improve ankle/foot mobiity, improve neuro-muscular control of ankle/foot, pattern gait and functional activity for PLOF.     Mell Hennessy, PT, DPT

## 2023-04-27 ENCOUNTER — CLINICAL SUPPORT (OUTPATIENT)
Dept: REHABILITATION | Facility: HOSPITAL | Age: 73
End: 2023-04-27
Attending: ORTHOPAEDIC SURGERY
Payer: MEDICARE

## 2023-04-27 DIAGNOSIS — M79.672 LEFT FOOT PAIN: Primary | ICD-10-CM

## 2023-04-27 PROCEDURE — 97140 MANUAL THERAPY 1/> REGIONS: CPT | Mod: PO

## 2023-04-27 PROCEDURE — 97112 NEUROMUSCULAR REEDUCATION: CPT | Mod: PO

## 2023-04-27 PROCEDURE — 97116 GAIT TRAINING THERAPY: CPT | Mod: PO

## 2023-04-27 PROCEDURE — 97110 THERAPEUTIC EXERCISES: CPT | Mod: PO

## 2023-04-27 NOTE — PROGRESS NOTES
Physical Therapy Daily Treatment Note     Name: Gricelda Hassan  Clinic Number: [id number]    Therapy Diagnosis:   Encounter Diagnosis   Name Primary?    Left foot pain Yes     Physician: Javier Hammonds MD    Visit Date: 4/27/2023  Physician Orders: PT Eval and Treat  Medical Diagnosis from Referral: Hallux valgus with bunions of left foot [M20.12, M21.612], Closed dislocation of metatarsophalangeal (joint), initial encounter [S93.129A]  Evaluation Date: 3/28/2023  Authorization Period Expiration: 3/21/2023 - 3/20/2024  Plan of Care Expiration: 6-20-23  Visit # / Visits authorized: 6/20    FOTO 1st: 36%   FOTO 6th: 62%  FOTO 10th:     Time In: 1100  Time Out: 1200  Total Billable Time: 54 minutes    Precautions: Standard    Subjective     Pt reports: that her back hurts in the morning 10/10 when waking up and 5/10 pain once get moving. States that her back pain has been going on for years. States that her pain is centralized in her back. States that her foot was sore at the ball of her foot.     She was compliant with home exercise program.  Response to previous treatment: progressing  Functional change: improved waling    Pain: 0/10  Location: left ankles and feet      Objective     Gait:  quad avoidant gait, increase WB on RLE    Passive Range of Motion:   Ankle Right Left   DF (knee extended) 5 5   Plantarflexion 60 65   Inversion 40 40   Eversion   15 10     Joint Mobility: hypomobile TC jt     Palpation: tender to superficial palpation of incisions over 1st met      Sensation: decreased over 1st MTP    SL stance:   EO: Left:  58s LOB and increase sway during, Right:  >60s   EC: NT due to lack balance     SL heel raise:  Right: 25  Left: 1= too painful     SL leg press 30#  Right: 20  Left:  20     Treatment       Gricelda received therapeutic exercises to develop for 8 minutes including:   Standing gastroc and soleus stretch 5x 30s bilat ea way ea muscle  Seated heel raise 2x 10 10s with over pressure        Not today  SL heel raise shuttle 410 12.5# 4x 15 25#  SL leg press x10 20# 2x 10 30#, 1x till fatigue       Gricelda received the following manual therapy techniques:  for 11 minutes, including:  Gtreat toe ext  1st met mob   Gr 3 sub-talar lateral for EV  - Gr 3 mob  - 2x10 MWM    Gricelda participated in neuromuscular re-education or  22 minutes. The following activities were included:  SL stance EC with UE balance support 6x 30s bilat   DL heel raise 4x10   SL heel raise on shuttle x5s 10s   Standing heel raise DL 75% wt on LLE    Not today      Gricelda participated in dynamic functional therapetic activities to improve functional performance for 00  minutes, including:      Gricelda participated in gait training to improve functional mobility and safety for 13 minutes, including:  Pt edu on gait and form in surgery walking shoe   Heel contact to mid stance x20  Mid stance to terminal stance x20  Terminal stance to heel contact x20  Gait cycle with cueing       Not today        Home Exercises Provided and Patient Education Provided     Education provided:   - edu on internvetions     Written Home Exercises Provided: Patient instructed to cont prior HEP.  Exercises were reviewed and Gricelda was able to demonstrate them prior to the end of the session.  Gricelda demonstrated good  understanding of the education provided.     See EMR under Patient Instructions for exercises provided prior visit.    Assessment   Pt presents to treatment with soreness since last treatment and decrease 1st MTP ext Range of Motion. Treatment focuses on review of new HEP: improving balance, DF, 1st Mtp mobility, calf strength, and LLE gross strength. Tolerated DF stretch well with no pain only stretch in calf reported. Pt had minimal pain with seated and standing heel raise with foam better than hard floor.     Gricelda Is progressing well towards her goals.   Pt prognosis is Excellent.     Pt will continue to benefit from  skilled outpatient physical therapy to address the deficits listed in the problem list box on initial evaluation, provide pt/family education and to maximize pt's level of independence in the home and community environment.     Pt's spiritual, cultural and educational needs considered and pt agreeable to plan of care and goals.    Anticipated barriers to physical therapy: none    Goals:  Short Term Goals: 2-6 weeks   Pt will be able to demonstrate 0 deg DF and >5 deg improvement in IV/ER/PF.  Pt will be able to demonstrate x10 DL heel raise no pain.  Pt will be able to demonstrate walk in cam boot in pain no AD.  Pt will be able to demonstrate 10 BW squats to chair in boot.  Pt will be able to demonstrate SL stance EO >30s bilat LE.  Pt will be able to demonstrate walking without cam boot with single point cane.        Long Term Goals: 6-12 weeks   Pt will be able to demonstrate full ankle Rom to unaffected side  Pt will be able to demonstrate stationary biking 10' no pain.  Pt will be able to demonstrate stair navigation 6 steps no pain no cueing.  Pt will subjectively report complete return to all ADLs and recreational activities.    Plan     Improve ankle/foot mobiity, improve neuro-muscular control of ankle/foot, pattern gait and functional activity for PLOF.     Mell Hennessy, PT, DPT

## 2023-05-02 NOTE — PROGRESS NOTES
Status/Diagnosis: Left hallux valgus with crossover toe deformity; 2nd MTP dorsal dislocation; 2nd metatarsalgia; rigid 2nd hammertoe  Date of Surgery: 02/01/2023  Date of Injury: none  Return visit: 6 weeks  X-rays on Return: WB 3-views Left foot XOP.    Present History:  Gricelda Hassan is a 72 y.o. female who returns today for routine postoperative visit overall doing well.  Overall doing well, 0/10 pain.  Only complaint today in regard to the plantar aspect of the forefoot at the 2nd and 3rd metatarsal heads.  Currently weight-bearing as tolerated in her short boot.  Denies any drainage, fever, chills.        Physical exam:  There were no vitals filed for this visit.  There is no height or weight on file to calculate BMI.  General: In no apparent distress; well developed and well nourished.  HEENT: normocephalic; atraumatic.  Cardiovascular: regular rate.  Respiratory: no increased work of breathing.  Musculoskeletal:   Gait: nonantalgic  Inspection:  Surgical sites are well healed. No signs or symptoms of infection.  Overall alignment well-maintained.  No tenderness at the great toe proximal phalanx with deep palpation.    Previously noted subjective complaints at the 2nd and 3rd metatarsals plantarly is minimally tender.  Gross motor and sensory function intact.  Palpable pedal pulse.     Imaging Studies/Outside documentation:  I have ordered/reviewed/interpreted the following images/outside documentation:  WB 3-views Left foot: Status post hallux valgus correction with 1st TMT joint fusion and proximal phalangeal osteotomy.  Also with 2nd MTP relocation and hammertoe correction with percutaneous pin in place.  Overall alignment well-maintained.  No evidence of implant loosening or failure.  Continued interval bony bridging present at the 1st TMT fusion site.  Callus formation at the proximal phalangeal osteotomy.  No amalia bony union at the 2nd hammertoe correction site.     Assessment:  Gricelda Kevin  Sonya is a 72 y.o. female with Left hallux valgus with crossover toe deformity; 2nd MTP dorsal dislocation; 2nd metatarsalgia; rigid 2nd hammertoe.     Plan:   Clinical and radiographic findings were discussed.  We will transition from short boot to postoperative shoe.  May continue weight-bearing as tolerated.  May transition from formal physical therapy to home exercise program as patient feels comfortable.    Patient mention upcoming beach trip in 1-2 weeks.  Instructed to wear the postoperative shoe as much as possible.  Return to clinic in 6 weeks for repeat evaluation and x-ray.    We did briefly discuss performing RIGHT hallux valgus correction in September.  We will coordinate accordingly.      This note was created using voice recognition software and may contain grammatical errors.

## 2023-05-15 ENCOUNTER — PATIENT MESSAGE (OUTPATIENT)
Dept: OBSTETRICS AND GYNECOLOGY | Facility: CLINIC | Age: 73
End: 2023-05-15
Payer: MEDICARE

## 2023-05-15 DIAGNOSIS — Z12.39 ENCOUNTER FOR SCREENING FOR MALIGNANT NEOPLASM OF BREAST, UNSPECIFIED SCREENING MODALITY: Primary | ICD-10-CM

## 2023-05-15 DIAGNOSIS — Z12.31 ENCOUNTER FOR SCREENING MAMMOGRAM FOR MALIGNANT NEOPLASM OF BREAST: ICD-10-CM

## 2023-05-16 ENCOUNTER — CLINICAL SUPPORT (OUTPATIENT)
Dept: REHABILITATION | Facility: HOSPITAL | Age: 73
End: 2023-05-16
Attending: ORTHOPAEDIC SURGERY
Payer: MEDICARE

## 2023-05-16 DIAGNOSIS — M79.672 LEFT FOOT PAIN: Primary | ICD-10-CM

## 2023-05-16 PROCEDURE — 97140 MANUAL THERAPY 1/> REGIONS: CPT | Mod: PO

## 2023-05-16 PROCEDURE — 97110 THERAPEUTIC EXERCISES: CPT | Mod: PO

## 2023-05-16 PROCEDURE — 97112 NEUROMUSCULAR REEDUCATION: CPT | Mod: PO

## 2023-05-16 NOTE — PROGRESS NOTES
"Physical Therapy Reassessment      [facility]  Clinic Number: [id number]    Therapy Diagnosis:   Encounter Diagnosis   Name Primary?    Left foot pain Yes     Physician: Javier Hammonds MD    Visit Date: 5/16/2023  Physician Orders: PT Eval and Treat  Medical Diagnosis from Referral: Hallux valgus with bunions of left foot [M20.12, M21.612], Closed dislocation of metatarsophalangeal (joint), initial encounter [S93.129A]  Evaluation Date: 3/28/2023  Authorization Period Expiration: 3/21/2023 - 3/20/2024  Plan of Care Expiration: 6-20-23  Visit # / Visits authorized: 6/20    FOTO 1st: 36%   FOTO 6th: 62%  FOTO 10th:     Time In: 1300  Time Out: 1400  Total Billable Time: 60 minutes    Precautions: Standard    Subjective     Pt reports: that her back his still rough and she feels better when she is up and moving. Her foot is coming along well. States she still has some swelling and tingling on the top of her foot by her scar. States her back is a 7/10 today.     She was compliant with home exercise program.  Response to previous treatment: progressing  Functional change: improved waling    Pain: 0/10  Location: left ankles and feet      Objective 5-16-23     Gait:  improved gait with equal WB, continuing with walking shoe     Passive Range of Motion:   Ankle Right Left   DF (knee extended) 5 5   Plantarflexion 60 65   Inversion 40 40   Eversion   15 15     Joint Mobility: WFL to unaffected side     Palpation: WFL     Sensation: reported "tingle" when the foot is swollen , WFL compared to unaffected side    SL stance:   EO:   Right:  >60s   Left: 58s LOB and increase sway during    EC:    Right: 12s  Left : 3s    SL heel raise:  Right: 25  Left: 13    SL leg press 40#  Right: 20  Left:  20     Treatment       Gricelda received therapeutic exercises to develop for 11 minutes including:  Reassessments   Prone lying 3 min (decrease back to 5/10)  SL clam 2x5 5-10s bilat (decrease to 3/10, then to 0/10 on 2nd " set)  Bridge 2x5 5s       Not today  SL heel raise shuttle 410 12.5# 4x 15 25#  SL leg press x10 20# 2x 10 30#, 1x till fatigue       Gricelda received the following manual therapy techniques:  for 8 minutes, including:  Gtreat toe ext  1st met mob   Gr 3 sub-talar lateral for EV  - Gr 3 mob  - 2x10 MWM    Gricelda participated in neuromuscular re-education or  31 minutes. The following activities were included:  Saggitall plane and front plane pelvic control   SL clam  Bridge   Squat re-training   2x 10 24'' box and 18'' + foam   1/2 kneel to standing  24'' box for UE assist 2x3  SL stance EC with UE balance support 5x 30s bilat   DL hopping in place, lat, fwd.bwd 3x5   DL heel raise 4x10     Not today  Standing heel raise DL 75% wt on LLE      Gricelda participated in dynamic functional therapetic activities to improve functional performance for 00  minutes, including:      Gricelda participated in gait training to improve functional mobility and safety for 00 minutes, including:        Home Exercises Provided and Patient Education Provided     Education provided:   - edu on internvetions     Written Home Exercises Provided: Patient instructed to cont prior HEP.  Exercises were reviewed and Gricelda was able to demonstrate them prior to the end of the session.  Gricelda demonstrated good  understanding of the education provided.     See EMR under Patient Instructions for exercises provided prior visit.    Assessment   Pt presents to 7th visit for reassessment. Pt demonstrates improved SL heel raise and SL stance EO and EC. Continues to demonstrates decrease stance time with EC and decrease calf strength in comparison. Pt complains of back pain 7/10 this improves with prone lying and then glute re-training to 0-2/10. Pt edu on improve lumbar-pelvic rhythm and saggitall plane and front plane pelvic control with functional activity 1/2 kneeling and her clams/bridges. Pt understands. Continue to improve calf strength  and balance. Progress impact re-training next visit. Need skilled PT to improve functional activity and impact for increase loads.     Gricelda Is progressing well towards her goals.   Pt prognosis is Excellent.     Pt will continue to benefit from skilled outpatient physical therapy to address the deficits listed in the problem list box on initial evaluation, provide pt/family education and to maximize pt's level of independence in the home and community environment.     Pt's spiritual, cultural and educational needs considered and pt agreeable to plan of care and goals.    Anticipated barriers to physical therapy: none    Goals:  Short Term Goals: 2-6 weeks   Pt will be able to demonstrate 0 deg DF and >5 deg improvement in IV/ER/PF.  Pt will be able to demonstrate x10 DL heel raise no pain.  Pt will be able to demonstrate walk in cam boot in pain no AD.  Pt will be able to demonstrate 10 BW squats to chair in boot.  Pt will be able to demonstrate SL stance EO >30s bilat LE.  Pt will be able to demonstrate walking without cam boot with single point cane.        Long Term Goals: 6-12 weeks   Pt will be able to demonstrate full ankle Rom to unaffected side  Pt will be able to demonstrate stationary biking 10' no pain.  Pt will be able to demonstrate stair navigation 6 steps no pain no cueing.  Pt will subjectively report complete return to all ADLs and recreational activities.    Plan     Improve ankle/foot mobiity, improve neuro-muscular control of ankle/foot, pattern gait and functional activity for PLOF.     Mell Hennessy, PT, DPT

## 2023-05-30 ENCOUNTER — CLINICAL SUPPORT (OUTPATIENT)
Dept: REHABILITATION | Facility: HOSPITAL | Age: 73
End: 2023-05-30
Attending: ORTHOPAEDIC SURGERY
Payer: MEDICARE

## 2023-05-30 DIAGNOSIS — M79.672 LEFT FOOT PAIN: Primary | ICD-10-CM

## 2023-05-30 PROCEDURE — 97112 NEUROMUSCULAR REEDUCATION: CPT | Mod: PO

## 2023-05-30 PROCEDURE — 97140 MANUAL THERAPY 1/> REGIONS: CPT | Mod: PO

## 2023-05-30 PROCEDURE — 97530 THERAPEUTIC ACTIVITIES: CPT | Mod: PO

## 2023-05-30 PROCEDURE — 97110 THERAPEUTIC EXERCISES: CPT | Mod: PO

## 2023-05-30 NOTE — PROGRESS NOTES
Physical Therapy Daily Treatment Note      Name: Gricelda Hassan  Clinic Number: [id number]    Therapy Diagnosis:   Encounter Diagnosis   Name Primary?    Left foot pain Yes     Physician: Javier Hammonds MD    Visit Date: 5/30/2023  Physician Orders: PT Eval and Treat  Medical Diagnosis from Referral: Hallux valgus with bunions of left foot [M20.12, M21.612], Closed dislocation of metatarsophalangeal (joint), initial encounter [S93.129A]  Evaluation Date: 3/28/2023  Authorization Period Expiration: 3/21/2023 - 3/20/2024  Plan of Care Expiration: 6-20-23  Visit # / Visits authorized: 8/20    FOTO 1st: 36%   FOTO 6th: 62%  FOTO 10th:     Time In: 1300  Time Out: 1400  Total Billable Time: 57 minutes    Precautions: Standard    Subjective     Pt reports: that her foot has been better but is sore in the arch of her foot when she walks 1-2 miles. The back has been 6/10 at rest and 9/10 when she wakes up early in the morning.     She was compliant with home exercise program.  Response to previous treatment: progressing  Functional change: improved waling    Pain: 0/10  Location: left ankles and feet      Objective 5-30-23     EC:    Right: 12s  Left : 5s    SL heel raise:  Right: 25  Left: 20    SL leg press 40#  Right: 20  Left:  20     Treatment       Gricelda received therapeutic exercises to develop for 12 minutes including:  Prone lying 3 min (decrease back to 5/10)  SL clam  3# 4x5 5-10s bilat (decrease to 3/10, then to 0/10 on 2nd set)  Bridge 4x5 5s       Not today  SL leg press x10 20# 2x 10 30#, 1x till fatigue       Gricelda received the following manual therapy techniques:  for 11 minutes, including:  Gtreat toe ext  1st met mob   Gr 3 sub-talar lateral for EV  - Gr 3 mob  - 2x10 MWM    Gricelda participated in neuromuscular re-education or  00 minutes. The following activities were included:       Not today  DL hopping in place, lat, fwd.bwd 3x5  DL heel raise 4x10         Gricelda participated in dynamic  functional therapetic activities to improve functional performance for 34  minutes, including:  SL stance EC with UE balance support 5x 30s bilat   Lateral step up 6'' 4x5 bilat   DL heel raise up SL lower 4x8   Lateral stepping with Red Therband 3x8   Fwd/bwd stepping with Red Therband 3x8     Gricelda participated in gait training to improve functional mobility and safety for 00 minutes, including:        Home Exercises Provided and Patient Education Provided     Education provided:   - edu on internvetions     Written Home Exercises Provided: Patient instructed to cont prior HEP.  Exercises were reviewed and Gricelda was able to demonstrate them prior to the end of the session.  Gricelda demonstrated good  understanding of the education provided.     See EMR under Patient Instructions for exercises provided prior visit.    Assessment   Pt presents with medial arch pain with PROM EV with over pressure. This does not improve with IV isometrics for post tib activation or sub talar mobs. Pt demonstrates improved SL heel raise and had no pain at the arch of her foot with all of the stepping activities. Continue to progress as tolerated with EV mobility and SL neuro-muscular control with functional activity.     Gricelda Is progressing well towards her goals.   Pt prognosis is Excellent.     Pt will continue to benefit from skilled outpatient physical therapy to address the deficits listed in the problem list box on initial evaluation, provide pt/family education and to maximize pt's level of independence in the home and community environment.     Pt's spiritual, cultural and educational needs considered and pt agreeable to plan of care and goals.    Anticipated barriers to physical therapy: none    Goals:  Short Term Goals: 2-6 weeks   Pt will be able to demonstrate 0 deg DF and >5 deg improvement in IV/ER/PF.  Pt will be able to demonstrate x10 DL heel raise no pain.  Pt will be able to demonstrate walk in cam boot  in pain no AD.  Pt will be able to demonstrate 10 BW squats to chair in boot.  Pt will be able to demonstrate SL stance EO >30s bilat LE.  Pt will be able to demonstrate walking without cam boot with single point cane.        Long Term Goals: 6-12 weeks   Pt will be able to demonstrate full ankle Rom to unaffected side  Pt will be able to demonstrate stationary biking 10' no pain.  Pt will be able to demonstrate stair navigation 6 steps no pain no cueing.  Pt will subjectively report complete return to all ADLs and recreational activities.    Plan     Improve ankle/foot mobiity, improve neuro-muscular control of ankle/foot, pattern gait and functional activity for PLOF.     Mell Hennessy, PT, DPT

## 2023-06-07 ENCOUNTER — PATIENT MESSAGE (OUTPATIENT)
Dept: OBSTETRICS AND GYNECOLOGY | Facility: CLINIC | Age: 73
End: 2023-06-07
Payer: MEDICARE

## 2023-06-08 DIAGNOSIS — M20.12 HALLUX VALGUS WITH BUNIONS OF LEFT FOOT: Primary | ICD-10-CM

## 2023-06-08 DIAGNOSIS — M21.612 HALLUX VALGUS WITH BUNIONS OF LEFT FOOT: Primary | ICD-10-CM

## 2023-06-13 ENCOUNTER — OFFICE VISIT (OUTPATIENT)
Dept: ORTHOPEDICS | Facility: CLINIC | Age: 73
End: 2023-06-13
Payer: MEDICARE

## 2023-06-13 ENCOUNTER — HOSPITAL ENCOUNTER (OUTPATIENT)
Dept: RADIOLOGY | Facility: HOSPITAL | Age: 73
Discharge: HOME | End: 2023-06-13
Attending: ORTHOPAEDIC SURGERY
Payer: MEDICARE

## 2023-06-13 ENCOUNTER — CLINICAL SUPPORT (OUTPATIENT)
Dept: REHABILITATION | Facility: HOSPITAL | Age: 73
End: 2023-06-13
Attending: ORTHOPAEDIC SURGERY
Payer: MEDICARE

## 2023-06-13 VITALS — HEIGHT: 63 IN | BODY MASS INDEX: 21.26 KG/M2 | WEIGHT: 120 LBS

## 2023-06-13 DIAGNOSIS — M20.42 HAMMERTOE OF SECOND TOE OF LEFT FOOT: ICD-10-CM

## 2023-06-13 DIAGNOSIS — M79.672 LEFT FOOT PAIN: Primary | ICD-10-CM

## 2023-06-13 DIAGNOSIS — S93.129A CLOSED DISLOCATION OF METATARSOPHALANGEAL (JOINT), INITIAL ENCOUNTER: ICD-10-CM

## 2023-06-13 DIAGNOSIS — M20.12 HALLUX VALGUS WITH BUNIONS OF LEFT FOOT: ICD-10-CM

## 2023-06-13 DIAGNOSIS — M20.12 HALLUX VALGUS WITH BUNIONS OF LEFT FOOT: Primary | ICD-10-CM

## 2023-06-13 DIAGNOSIS — M21.612 HALLUX VALGUS WITH BUNIONS OF LEFT FOOT: ICD-10-CM

## 2023-06-13 DIAGNOSIS — M21.612 HALLUX VALGUS WITH BUNIONS OF LEFT FOOT: Primary | ICD-10-CM

## 2023-06-13 PROCEDURE — 97112 NEUROMUSCULAR REEDUCATION: CPT | Mod: PO,CQ

## 2023-06-13 PROCEDURE — 97110 THERAPEUTIC EXERCISES: CPT | Mod: PO,CQ

## 2023-06-13 PROCEDURE — 73630 XR FOOT COMPLETE 3 VIEW LEFT: ICD-10-PCS | Mod: 26,LT,, | Performed by: RADIOLOGY

## 2023-06-13 PROCEDURE — 97140 MANUAL THERAPY 1/> REGIONS: CPT | Mod: PO,CQ

## 2023-06-13 PROCEDURE — 73630 X-RAY EXAM OF FOOT: CPT | Mod: 26,LT,, | Performed by: RADIOLOGY

## 2023-06-13 PROCEDURE — 97530 THERAPEUTIC ACTIVITIES: CPT | Mod: PO,CQ

## 2023-06-13 PROCEDURE — 99999 PR PBB SHADOW E&M-EST. PATIENT-LVL III: CPT | Mod: PBBFAC,,, | Performed by: ORTHOPAEDIC SURGERY

## 2023-06-13 PROCEDURE — 99213 OFFICE O/P EST LOW 20 MIN: CPT | Mod: S$PBB,,, | Performed by: ORTHOPAEDIC SURGERY

## 2023-06-13 PROCEDURE — 99999 PR PBB SHADOW E&M-EST. PATIENT-LVL III: ICD-10-PCS | Mod: PBBFAC,,, | Performed by: ORTHOPAEDIC SURGERY

## 2023-06-13 PROCEDURE — 99213 PR OFFICE/OUTPT VISIT, EST, LEVL III, 20-29 MIN: ICD-10-PCS | Mod: S$PBB,,, | Performed by: ORTHOPAEDIC SURGERY

## 2023-06-13 PROCEDURE — 99213 OFFICE O/P EST LOW 20 MIN: CPT | Mod: PBBFAC,PN | Performed by: ORTHOPAEDIC SURGERY

## 2023-06-13 PROCEDURE — 73630 X-RAY EXAM OF FOOT: CPT | Mod: TC,PO,LT

## 2023-06-13 NOTE — PROGRESS NOTES
Physical Therapy Daily Treatment Note      Name: Gricelda Hassan  Clinic Number: [id number]    Therapy Diagnosis:   Encounter Diagnosis   Name Primary?    Left foot pain Yes     Physician: Javier Hammonds MD    Visit Date: 6/13/2023  Physician Orders: PT Eval and Treat  Medical Diagnosis from Referral: Hallux valgus with bunions of left foot [M20.12, M21.612], Closed dislocation of metatarsophalangeal (joint), initial encounter [S93.129A]  Evaluation Date: 3/28/2023  Authorization Period Expiration: 3/21/2023 - 3/20/2024  Plan of Care Expiration: 6-20-23  Visit # / Visits authorized: 9/20    FOTO 1st: 36%   FOTO 6th: 62%  FOTO 10th:     Time In: 1300  Time Out: 1353  Total Billable Time: 53 minutes    Precautions: Standard    Subjective     Pt reports: she followed up with Dr. Hammonds this morning and he is pleased with progress so far. Patient states she can progress to regular shoe as well. Patient states she continues to walk almost 2.5 miles several times a week. Patient states that she is no longer experiencing pain in (L) arch now that she is out of [place] boot and in regular shoe. She was compliant with home exercise program.  Response to previous treatment: progressing  Functional change: improved waling    Pain: 0/10  Location: left ankles and feet      Objective      Treatment     Gricelda received therapeutic exercises to develop for 10 minutes including:  Prone lying 3 min (decrease back to 5/10)  SL clam  3# 4x5 5-10s bilat (decrease to 3/10, then to 0/10 on 2nd set)  Bridge 4x5 5s     Not today  SL leg press x10 20# 2x 10 30#, 1x till fatigue     Gricelda received the following manual therapy techniques: for 15 minutes, including:  Gtreat toe ext  1st met mob   Gr 3 sub-talar lateral for EV  - Gr 3 mob  - 2x10 MWM    Gricelda participated in neuromuscular re-education or 10 minutes. The following activities were included:  SL stance EC with UE balance support 5x 30s bilat   Foot dome x 20 (B)      Gricelda participated in dynamic functional therapetic activities to improve functional performance for 15 minutes, including:  Lateral step up 6'' 4x5 bilat   DL heel raise up SL lower 4x8 (L)  Lateral stepping with Red Therband 3x8   Fwd/bwd stepping with Red Therband 3x8     Gricelda participated in gait training to improve functional mobility and safety for 00 minutes, including:    Home Exercises Provided and Patient Education Provided     Education provided:   - edu on internvetions     Written Home Exercises Provided: Patient instructed to cont prior HEP.  Exercises were reviewed and Gricelda was able to demonstrate them prior to the end of the session.  Gricelda demonstrated good  understanding of the education provided.     See EMR under Patient Instructions for exercises provided prior visit.    Assessment     Gricelda demonstrating good arch control and she is able to dome without complaints of pain today. Minimal cues to correct valgus collapse when performing step ups and resisted walking. Patient demonstrating ability to maintain single limb stance with eyes open without UE support. Patient does require 1 finger assist with eyes closed. Patient is progressing toward discharge.     Gricelda Is progressing well towards her goals.   Pt prognosis is Excellent.     Pt will continue to benefit from skilled outpatient physical therapy to address the deficits listed in the problem list box on initial evaluation, provide pt/family education and to maximize pt's level of independence in the home and community environment.     Pt's spiritual, cultural and educational needs considered and pt agreeable to plan of care and goals.    Anticipated barriers to physical therapy: none    Goals:  Short Term Goals: 2-6 weeks   Pt will be able to demonstrate 0 deg DF and >5 deg improvement in IV/ER/PF.  Pt will be able to demonstrate x10 DL heel raise no pain.  Pt will be able to demonstrate walk in cam boot in pain no  AD.  Pt will be able to demonstrate 10 BW squats to chair in boot.  Pt will be able to demonstrate SL stance EO >30s bilat LE.  Pt will be able to demonstrate walking without cam boot with single point cane.        Long Term Goals: 6-12 weeks   Pt will be able to demonstrate full ankle Rom to unaffected side  Pt will be able to demonstrate stationary biking 10' no pain.  Pt will be able to demonstrate stair navigation 6 steps no pain no cueing.  Pt will subjectively report complete return to all ADLs and recreational activities.    Plan     Improve ankle/foot mobiity, improve neuro-muscular control of ankle/foot, pattern gait and functional activity for PLOF.     Marina Nelson, PTA

## 2023-06-13 NOTE — PROGRESS NOTES
Status/Diagnosis: Left hallux valgus with crossover toe deformity; 2nd MTP dorsal dislocation; 2nd metatarsalgia; rigid 2nd hammertoe  Date of Surgery: 02/01/2023  Date of Injury: none  Return visit: September 2023  X-rays on Return: WB 3-views Bilateral feet    Present History:  Gricelda Hassan is a 72 y.o. female who returns today for routine postoperative visit overall doing well.  Overall doing well, 0/10 pain.  Currently weight-bearing as tolerated in a postoperative shoe.  Using her bunion gel spacer intermittently however she does report that she lost the small 1 and is requesting a new 1 today.  Still with mild persistent swelling about the left forefoot.  Still desires to undergo the right bunion correction in fall 2023.         Physical exam:  There were no vitals filed for this visit.  Body mass index is 21.26 kg/m².  General: In no apparent distress; well developed and well nourished.  HEENT: normocephalic; atraumatic.  Cardiovascular: regular rate.  Respiratory: no increased work of breathing.  Musculoskeletal:   Gait: nonantalgic  Inspection:  Surgical sites are well healed. No signs or symptoms of infection.  Overall alignment well-maintained.  No tenderness at the great toe proximal phalanx with deep palpation.  Mild residual swelling to be expected.  No signs or symptoms of infection.  No subjective complaints of the 2nd or 3rd plantar metatarsal heads.  Only minimal tenderness at the 1st TMT fusion site.       Imaging Studies/Outside documentation:  I have ordered/reviewed/interpreted the following images/outside documentation:  WB 3-views Left foot: Status post hallux valgus correction with 1st TMT joint fusion and proximal phalangeal osteotomy.  Also with 2nd MTP relocation and hammertoe correction with percutaneous pin in place.  Overall alignment well-maintained.  No evidence of implant loosening or failure.  Near-complete bony healing at the 1st TMT fusion site and great toe proximal  phalangeal osteotomy site.  Subtle but interval bony bridging present at the intended 2nd toe PIP fusion site.       Assessment:  Gricelda Hassan is a 72 y.o. female with Left hallux valgus with crossover toe deformity; 2nd MTP dorsal dislocation; 2nd metatarsalgia; rigid 2nd hammertoe.     Plan:   Clinical and radiographic findings were discussed.  Patient may transition from postoperative shoe wear to good supportive shoes.  Did provide a new bunion gel spacer today to be worn for most of the day.  Did discuss scar massage of the 2nd MTP joint dorsally.  We will continue to monitor postoperative swelling.  Discussed ice elevation at the end of the day.  May perform low impact exercises like cycling.  Patient reports she was actually already been walking 2-2-1/2 miles per day.    Return to clinic in September for repeat evaluation of the left foot and likely preoperative planning for the right foot.      This note was created using voice recognition software and may contain grammatical errors.

## 2023-06-29 ENCOUNTER — CLINICAL SUPPORT (OUTPATIENT)
Dept: REHABILITATION | Facility: HOSPITAL | Age: 73
End: 2023-06-29
Attending: ORTHOPAEDIC SURGERY
Payer: MEDICARE

## 2023-06-29 DIAGNOSIS — M79.672 LEFT FOOT PAIN: Primary | ICD-10-CM

## 2023-06-29 PROCEDURE — 97110 THERAPEUTIC EXERCISES: CPT | Mod: PO

## 2023-06-29 NOTE — PROGRESS NOTES
Physical Therapy Daily Treatment Note      [facility]  Clinic Number: [id number]    Therapy Diagnosis:   Encounter Diagnosis   Name Primary?    Left foot pain Yes     Physician: Javier Hammonds MD    Visit Date: 6/29/2023  Physician Orders: PT Eval and Treat  Medical Diagnosis from Referral: Hallux valgus with bunions of left foot [M20.12, M21.612], Closed dislocation of metatarsophalangeal (joint), initial encounter [S93.129A]  Evaluation Date: 3/28/2023  Authorization Period Expiration: 3/21/2023 - 3/20/2024  Plan of Care Expiration: 6-20-23  Visit # / Visits authorized: 10/20    FOTO 1st: 36%   FOTO 6th: 62%  FOTO 10th:     Time In: 1105  Time Out: 1245  Total Billable Time: 50 minutes    Precautions: Standard    Subjective     Pt reports: she feel that she is back to her PLOF. She would like for today to be her last visit.   Response to previous treatment: progressing  Functional change: improved waling    Pain: 0/10  Location: left ankles and feet      Objective      Treatment     Gricelda received therapeutic exercises to develop for 40 minutes including:  HEP review (see instructions)  SL clam  3# 4x5 5-10s bilat (decrease to 3/10, then to 0/10 on 2nd set)  Bridge 4x5 5s   SL stance EC with UE balance support 5x 30s bilat   Lateral step up 6'' 4x5 bilat   DL heel raise up SL lower 4x8 (L)  Lateral stepping with Red Therband 3x8   Fwd/bwd stepping with Red Therband 3x8     Home Exercises Provided and Patient Education Provided     Education provided:   - edu on internvetions     Written Home Exercises Provided: Patient instructed to cont prior HEP.  Exercises were reviewed and Gricelda was able to demonstrate them prior to the end of the session.  Gricelda demonstrated good  understanding of the education provided.     See EMR under Patient Instructions for exercises provided prior visit.    Assessment     Patient to d/c at today's visit. Patient has subjectively reacher her PLOF and would like  to d/c after this session.     Gricelda Is progressing well towards her goals.   Pt prognosis is Excellent.     Pt will continue to benefit from skilled outpatient physical therapy to address the deficits listed in the problem list box on initial evaluation, provide pt/family education and to maximize pt's level of independence in the home and community environment.     Pt's spiritual, cultural and educational needs considered and pt agreeable to plan of care and goals.    Anticipated barriers to physical therapy: none    Goals:  Short Term Goals: 2-6 weeks   Pt will be able to demonstrate 0 deg DF and >5 deg improvement in IV/ER/PF.  Pt will be able to demonstrate x10 DL heel raise no pain.  Pt will be able to demonstrate walk in cam boot in pain no AD.  Pt will be able to demonstrate 10 BW squats to chair in boot.  Pt will be able to demonstrate SL stance EO >30s bilat LE.  Pt will be able to demonstrate walking without cam boot with single point cane.        Long Term Goals: 6-12 weeks   Pt will be able to demonstrate full ankle Rom to unaffected side  Pt will be able to demonstrate stationary biking 10' no pain.  Pt will be able to demonstrate stair navigation 6 steps no pain no cueing.  Pt will subjectively report complete return to all ADLs and recreational activities.    Plan     Improve ankle/foot mobiity, improve neuro-muscular control of ankle/foot, pattern gait and functional activity for PLOF.     Cirilo Tello, PT

## 2023-07-12 ENCOUNTER — PATIENT MESSAGE (OUTPATIENT)
Dept: ORTHOPEDICS | Facility: CLINIC | Age: 73
End: 2023-07-12
Payer: MEDICARE

## 2023-07-13 ENCOUNTER — OFFICE VISIT (OUTPATIENT)
Dept: ORTHOPEDICS | Facility: CLINIC | Age: 73
End: 2023-07-13
Payer: MEDICARE

## 2023-07-13 ENCOUNTER — HOSPITAL ENCOUNTER (OUTPATIENT)
Dept: RADIOLOGY | Facility: HOSPITAL | Age: 73
Discharge: HOME | End: 2023-07-13
Attending: ORTHOPAEDIC SURGERY
Payer: MEDICARE

## 2023-07-13 DIAGNOSIS — S93.129A CLOSED DISLOCATION OF METATARSOPHALANGEAL (JOINT), INITIAL ENCOUNTER: ICD-10-CM

## 2023-07-13 DIAGNOSIS — M20.12 HALLUX VALGUS WITH BUNIONS OF LEFT FOOT: ICD-10-CM

## 2023-07-13 DIAGNOSIS — M20.42 HAMMERTOE OF SECOND TOE OF LEFT FOOT: ICD-10-CM

## 2023-07-13 DIAGNOSIS — M77.42 METATARSALGIA OF LEFT FOOT: ICD-10-CM

## 2023-07-13 DIAGNOSIS — M21.612 HALLUX VALGUS WITH BUNIONS OF LEFT FOOT: Primary | ICD-10-CM

## 2023-07-13 DIAGNOSIS — M21.612 HALLUX VALGUS WITH BUNIONS OF LEFT FOOT: ICD-10-CM

## 2023-07-13 DIAGNOSIS — M20.12 HALLUX VALGUS WITH BUNIONS OF LEFT FOOT: Primary | ICD-10-CM

## 2023-07-13 PROCEDURE — 99999 PR PBB SHADOW E&M-EST. PATIENT-LVL II: ICD-10-PCS | Mod: PBBFAC,,, | Performed by: ORTHOPAEDIC SURGERY

## 2023-07-13 PROCEDURE — 73630 X-RAY EXAM OF FOOT: CPT | Mod: 26,LT,, | Performed by: RADIOLOGY

## 2023-07-13 PROCEDURE — 99213 OFFICE O/P EST LOW 20 MIN: CPT | Mod: S$PBB,,, | Performed by: ORTHOPAEDIC SURGERY

## 2023-07-13 PROCEDURE — 99213 PR OFFICE/OUTPT VISIT, EST, LEVL III, 20-29 MIN: ICD-10-PCS | Mod: S$PBB,,, | Performed by: ORTHOPAEDIC SURGERY

## 2023-07-13 PROCEDURE — 73630 XR FOOT COMPLETE 3 VIEW LEFT: ICD-10-PCS | Mod: 26,LT,, | Performed by: RADIOLOGY

## 2023-07-13 PROCEDURE — 99212 OFFICE O/P EST SF 10 MIN: CPT | Mod: PBBFAC,PN | Performed by: ORTHOPAEDIC SURGERY

## 2023-07-13 PROCEDURE — 99999 PR PBB SHADOW E&M-EST. PATIENT-LVL II: CPT | Mod: PBBFAC,,, | Performed by: ORTHOPAEDIC SURGERY

## 2023-07-13 PROCEDURE — 73630 X-RAY EXAM OF FOOT: CPT | Mod: TC,PO,LT

## 2023-07-13 RX ORDER — CLINDAMYCIN HYDROCHLORIDE 300 MG/1
300 CAPSULE ORAL 2 TIMES DAILY
Qty: 14 CAPSULE | Refills: 0 | Status: SHIPPED | OUTPATIENT
Start: 2023-07-13 | End: 2023-07-20

## 2023-07-13 NOTE — PROGRESS NOTES
Status/Diagnosis: Left hallux valgus with crossover toe deformity; 2nd MTP dorsal dislocation; 2nd metatarsalgia; rigid 2nd hammertoe  Date of Surgery: 02/01/2023  Date of Injury: none  Return visit: September 2023  X-rays on Return: WB 3-views Bilateral feet    Present History:  Gricelda Hassan is a 73 y.o. female who returns today prior to her regularly scheduled visit in September for right foot preoperative planning.    Endorses a 4-5 day history of new onset left medial midfoot pain, swelling, warmth.  Denies any history of injury or other inciting events.  Patient has been extremely active.  She bikes and walks regularly for exercise.  Denies any drainage, fever, chills.  Since new onset symptoms 5 days ago, patient has been weight-bearing in the postoperative shoe.  Still using bunion gel spacer.  Has refrain from any high impact activities.      Physical exam:  There were no vitals filed for this visit.  There is no height or weight on file to calculate BMI.  General: In no apparent distress; well developed and well nourished.  HEENT: normocephalic; atraumatic.  Cardiovascular: regular rate.  Respiratory: no increased work of breathing.  Musculoskeletal:   Gait: nonantalgic  Inspection:  Surgical sites are well healed.  Overall alignment well-maintained.  No tenderness at the great toe proximal phalanx with deep palpation.    New onset swelling and warmth involving the medial aspect of the midfoot with lateral extension.  Minimal redness.  No drainage or fluctuance.  No signs or symptoms of infection but with increased tenderness at the 1st TMT fusion site.   No subjective complaints of the 2nd or 3rd plantar metatarsal heads.         Imaging Studies/Outside documentation:  I have ordered/reviewed/interpreted the following images/outside documentation:  WB 3-views Left foot: Status post hallux valgus correction with 1st TMT joint fusion and proximal phalangeal osteotomy.  Also with 2nd MTP relocation and  hammertoe correction.  Overall alignment well-maintained.  No evidence of implant loosening or failure.  First TMT fusion site and great toe proximal phalangeal osteotomy site appear to be well healed.  Subtle but interval bony bridging present at the intended 2nd toe PIP fusion site.  No evidence of implant loosening or failure.       Assessment:  Gricelda Hassan is a 73 y.o. female with Left hallux valgus with crossover toe deformity; 2nd MTP dorsal dislocation; 2nd metatarsalgia; rigid 2nd hammertoe.     Plan:   Clinical and radiographic findings were discussed.  While there is no evidence of infection at this time.  We will prescribe a short course of p.o. clindamycin prophylactically.  Patient will continue postoperative shoe wear and continue to manage her symptoms accordingly.  Discussed rest, ice, compression, elevation.  Patient and family voiced understanding.  All questions were answered.  At this time we will keep her previously scheduled appointment in September for right foot preoperative planning.  Patient given instructions to certainly call my office in the next several weeks should left midfoot symptoms persist or worsen.      This note was created using voice recognition software and may contain grammatical errors.

## 2023-08-15 ENCOUNTER — HOSPITAL ENCOUNTER (OUTPATIENT)
Dept: RADIOLOGY | Facility: HOSPITAL | Age: 73
Discharge: HOME | End: 2023-08-15
Attending: OBSTETRICS & GYNECOLOGY
Payer: MEDICARE

## 2023-08-15 DIAGNOSIS — Z12.39 ENCOUNTER FOR SCREENING FOR MALIGNANT NEOPLASM OF BREAST, UNSPECIFIED SCREENING MODALITY: ICD-10-CM

## 2023-08-15 DIAGNOSIS — Z12.31 ENCOUNTER FOR SCREENING MAMMOGRAM FOR MALIGNANT NEOPLASM OF BREAST: ICD-10-CM

## 2023-08-15 PROCEDURE — 77063 MAMMO DIGITAL SCREENING BILAT WITH TOMO: ICD-10-PCS | Mod: 26,,, | Performed by: RADIOLOGY

## 2023-08-15 PROCEDURE — 77067 MAMMO DIGITAL SCREENING BILAT WITH TOMO: ICD-10-PCS | Mod: 26,,, | Performed by: RADIOLOGY

## 2023-08-15 PROCEDURE — 77063 BREAST TOMOSYNTHESIS BI: CPT | Mod: 26,,, | Performed by: RADIOLOGY

## 2023-08-15 PROCEDURE — 77067 SCR MAMMO BI INCL CAD: CPT | Mod: TC,PO

## 2023-08-15 PROCEDURE — 77067 SCR MAMMO BI INCL CAD: CPT | Mod: 26,,, | Performed by: RADIOLOGY

## 2023-08-25 ENCOUNTER — PATIENT MESSAGE (OUTPATIENT)
Dept: ORTHOPEDICS | Facility: CLINIC | Age: 73
End: 2023-08-25
Payer: MEDICARE

## 2023-09-18 DIAGNOSIS — M21.612 HALLUX VALGUS WITH BUNIONS OF LEFT FOOT: Primary | ICD-10-CM

## 2023-09-18 DIAGNOSIS — M20.12 HALLUX VALGUS WITH BUNIONS OF LEFT FOOT: Primary | ICD-10-CM

## 2023-09-21 ENCOUNTER — OFFICE VISIT (OUTPATIENT)
Dept: ORTHOPEDICS | Facility: CLINIC | Age: 73
End: 2023-09-21
Payer: MEDICARE

## 2023-09-21 ENCOUNTER — HOSPITAL ENCOUNTER (OUTPATIENT)
Dept: RADIOLOGY | Facility: HOSPITAL | Age: 73
Discharge: HOME | End: 2023-09-21
Attending: ORTHOPAEDIC SURGERY
Payer: MEDICARE

## 2023-09-21 DIAGNOSIS — M21.612 HALLUX VALGUS WITH BUNIONS OF LEFT FOOT: Primary | ICD-10-CM

## 2023-09-21 DIAGNOSIS — M20.12 HALLUX VALGUS WITH BUNIONS OF LEFT FOOT: ICD-10-CM

## 2023-09-21 DIAGNOSIS — M20.42 HAMMERTOE OF SECOND TOE OF LEFT FOOT: ICD-10-CM

## 2023-09-21 DIAGNOSIS — M20.12 HALLUX VALGUS WITH BUNIONS OF LEFT FOOT: Primary | ICD-10-CM

## 2023-09-21 DIAGNOSIS — M20.41 HAMMERTOE OF SECOND TOE OF RIGHT FOOT: ICD-10-CM

## 2023-09-21 DIAGNOSIS — M21.612 HALLUX VALGUS WITH BUNIONS OF LEFT FOOT: ICD-10-CM

## 2023-09-21 DIAGNOSIS — Q66.211 ACQUIRED HALLUX VALGUS OF RIGHT FOOT DUE TO METATARSUS PRIMUS VARUS: ICD-10-CM

## 2023-09-21 PROCEDURE — 99999 PR PBB SHADOW E&M-EST. PATIENT-LVL II: ICD-10-PCS | Mod: PBBFAC,,, | Performed by: ORTHOPAEDIC SURGERY

## 2023-09-21 PROCEDURE — 73630 X-RAY EXAM OF FOOT: CPT | Mod: 26,50,, | Performed by: RADIOLOGY

## 2023-09-21 PROCEDURE — 99214 PR OFFICE/OUTPT VISIT, EST, LEVL IV, 30-39 MIN: ICD-10-PCS | Mod: S$PBB,,, | Performed by: ORTHOPAEDIC SURGERY

## 2023-09-21 PROCEDURE — 73630 XR FOOT COMPLETE 3 VIEW BILATERAL: ICD-10-PCS | Mod: 26,50,, | Performed by: RADIOLOGY

## 2023-09-21 PROCEDURE — 99214 OFFICE O/P EST MOD 30 MIN: CPT | Mod: S$PBB,,, | Performed by: ORTHOPAEDIC SURGERY

## 2023-09-21 PROCEDURE — 99999 PR PBB SHADOW E&M-EST. PATIENT-LVL II: CPT | Mod: PBBFAC,,, | Performed by: ORTHOPAEDIC SURGERY

## 2023-09-21 PROCEDURE — 99212 OFFICE O/P EST SF 10 MIN: CPT | Mod: PBBFAC,PN | Performed by: ORTHOPAEDIC SURGERY

## 2023-09-21 PROCEDURE — 73630 X-RAY EXAM OF FOOT: CPT | Mod: TC,50,PO

## 2023-09-21 NOTE — PROGRESS NOTES
Status/Diagnosis: Left hallux valgus with crossover toe deformity; 2nd MTP dorsal dislocation; 2nd metatarsalgia; rigid 2nd hammertoe  Date of Surgery: 02/01/2023  Date of Injury: none  Return visit: November 2023  X-rays on Return: WB 3-views Left foot    Present History:  Gricelda Hassan is a 73 y.o. female who returns today for routine postop visit.  Overall doing well.  Only complaint on the left is in regard to pain at the base of the plantar 2nd toe.  Pain only present when weight-bearing barefoot.  No new injuries.  Denies any numbness or tingling.  No pain over the 1st metatarsal.    Also with worsening right 2nd hammertoe pain.  No pain specifically related to hallux valgus but with worsening crossover toe deformity and concomitant hammertoe.    Patient continues exercise regularly.  Walk 3-4 miles per day.  Bikes 10+ miles.      Physical exam:  There were no vitals filed for this visit.  There is no height or weight on file to calculate BMI.  General: In no apparent distress; well developed and well nourished.  HEENT: normocephalic; atraumatic.  Cardiovascular: regular rate.  Respiratory: no increased work of breathing.  Musculoskeletal:   Gait: nonantalgic  Inspection:  Surgical sites are well healed.  Overall alignment well-maintained.  No tenderness at the great toe proximal phalanx with deep palpation.    Mild tenderness over the 2nd MTP joint plantarly on the left.  No pain with 2nd toe motion.  No tenderness despite deep palpation over the 1st metatarsal shaft on the left.    On the right, patient has mild-to-moderate hallux valgus.  Rigid 2nd hammertoe deformity with early crossover 2nd over 1st deformity.         Imaging Studies/Outside documentation:  I have ordered/reviewed/interpreted the following images/outside documentation:  WB 3-views bilateral feet:   On the left,  Status post hallux valgus correction with 1st TMT joint fusion and proximal phalangeal osteotomy.  Also with 2nd MTP  relocation and hammertoe correction.  Overall alignment well-maintained.  No evidence of implant loosening or failure.  First TMT fusion site and great toe proximal phalangeal osteotomy site are well healed.  New onset cortical thickening with what appears to be a stress fracture involving the 1st metatarsal mid diaphysis.  No tenderness on clinical exam.  On the right,  Moderate hallux valgus with lateral deviation of the 1st MTP joint.  Rigid 2nd hammertoe deformity.         Assessment:  Gricelda Hassan is a 73 y.o. female with Left hallux valgus with crossover toe deformity; 2nd MTP dorsal dislocation; 2nd metatarsalgia; rigid 2nd hammertoe.     Plan:   Clinical and radiographic findings were discussed.    Patient continues to do well postop.  In regard to her only complaint on the left, recommend refraining from walking barefoot.  Good supportive shoe wear at all times.  Refrain from any high impact activities-running, jumping, etc..    In regard to her worsening right 2nd hammertoe symptoms, discussed shoe wear and activity modification.  No specific symptoms related to the bunion at this time.  We did discuss the potential for minimally invasive bunion correction surgery with concomitant 2nd hammertoe correction.  Patient will return to clinic in 2 months for repeat evaluation.  Weightbearing x-rays on the left at that time to assess for worsening potential 1st metatarsal stress fracture, or sooner if needed.  Patient and family voiced understanding.  All questions were answered.        This note was created using voice recognition software and may contain grammatical errors.

## 2023-10-05 ENCOUNTER — PATIENT MESSAGE (OUTPATIENT)
Dept: ADMINISTRATIVE | Facility: OTHER | Age: 73
End: 2023-10-05
Payer: MEDICARE

## 2023-10-09 ENCOUNTER — OFFICE VISIT (OUTPATIENT)
Dept: ORTHOPEDICS | Facility: CLINIC | Age: 73
End: 2023-10-09
Payer: MEDICARE

## 2023-10-09 DIAGNOSIS — M65.342 TRIGGER RING FINGER OF LEFT HAND: ICD-10-CM

## 2023-10-09 DIAGNOSIS — M65.341 TRIGGER RING FINGER OF RIGHT HAND: Primary | ICD-10-CM

## 2023-10-09 PROCEDURE — 99213 OFFICE O/P EST LOW 20 MIN: CPT | Mod: PBBFAC,PO,25 | Performed by: PHYSICIAN ASSISTANT

## 2023-10-09 PROCEDURE — 99213 OFFICE O/P EST LOW 20 MIN: CPT | Mod: S$PBB,25,, | Performed by: PHYSICIAN ASSISTANT

## 2023-10-09 PROCEDURE — 99999PBSHW PR PBB SHADOW TECHNICAL ONLY FILED TO HB: ICD-10-PCS | Mod: PBBFAC,,,

## 2023-10-09 PROCEDURE — 99999 PR PBB SHADOW E&M-EST. PATIENT-LVL III: CPT | Mod: PBBFAC,,, | Performed by: PHYSICIAN ASSISTANT

## 2023-10-09 PROCEDURE — 20550 TENDON SHEATH: ICD-10-PCS | Mod: S$PBB,50,, | Performed by: PHYSICIAN ASSISTANT

## 2023-10-09 PROCEDURE — 99213 PR OFFICE/OUTPT VISIT, EST, LEVL III, 20-29 MIN: ICD-10-PCS | Mod: S$PBB,25,, | Performed by: PHYSICIAN ASSISTANT

## 2023-10-09 PROCEDURE — 20550 NJX 1 TENDON SHEATH/LIGAMENT: CPT | Mod: S$PBB,50,, | Performed by: PHYSICIAN ASSISTANT

## 2023-10-09 PROCEDURE — 99999PBSHW PR PBB SHADOW TECHNICAL ONLY FILED TO HB: Mod: PBBFAC,,,

## 2023-10-09 PROCEDURE — 99999 PR PBB SHADOW E&M-EST. PATIENT-LVL III: ICD-10-PCS | Mod: PBBFAC,,, | Performed by: PHYSICIAN ASSISTANT

## 2023-10-09 PROCEDURE — 20550 NJX 1 TENDON SHEATH/LIGAMENT: CPT | Mod: PBBFAC,PO,RT | Performed by: PHYSICIAN ASSISTANT

## 2023-10-09 RX ORDER — TRIAMCINOLONE ACETONIDE 40 MG/ML
40 INJECTION, SUSPENSION INTRA-ARTICULAR; INTRAMUSCULAR
Status: DISCONTINUED | OUTPATIENT
Start: 2023-10-09 | End: 2023-10-09 | Stop reason: HOSPADM

## 2023-10-09 RX ADMIN — TRIAMCINOLONE ACETONIDE 40 MG: 40 INJECTION, SUSPENSION INTRA-ARTICULAR; INTRAMUSCULAR at 04:10

## 2023-10-09 NOTE — PROGRESS NOTES
10/9/2023    Chief Complaint:  Chief Complaint   Patient presents with    Right Hand - Pain    Left Hand - Pain        HPI:  Gricelda Hassan is a 73 y.o. female who presents to clinic today for evaluation of her bilateral ring finger trigger finger.  Denies any previous medical treatment.  States he is here today for evaluation.  Denies any other complaints at this time.    PMHX:  Past Medical History:   Diagnosis Date    Atrial tachycardia     Hyperlipidemia     Hypertension     Ischemic colitis        PSHX:  Past Surgical History:   Procedure Laterality Date    CORRECTION OF HAMMER TOE Left 2023    Procedure: CORRECTION, HAMMER TOE;  Surgeon: Javier Hammonds MD;  Location: [facility] OR;  Service: Orthopedics;  Laterality: Left;  2nd    LAPIDUS BUNIONECTOMY Left 2023    Procedure: BUNIONECTOMY, LAPIDUS;  Surgeon: Javier Hammonds MD;  Location: [facility] OR;  Service: Orthopedics;  Laterality: Left;    OPEN TREATMENT, DISLOCATION, MTP JOINT Left 2023    Procedure: OPEN TREATMENT, DISLOCATION, MTP JOINT;  Surgeon: Javier Hammonds MD;  Location: [facility] OR;  Service: Orthopedics;  Laterality: Left;  2nd    SURGICAL REMOVAL OF METATARSAL HEAD Left 2023    Procedure: OSTECTOMY, METATARSAL BONE, HEAD;  Surgeon: Javier Hammonds MD;  Location: [facility] OR;  Service: Orthopedics;  Laterality: Left;    TUBAL LIGATION         FMHX:  Family History   Problem Relation Age of Onset    COPD Mother     Heart disease Father         CABG age 60    Hypertension Brother     Neuropathy Brother     Hypertension Sister     No Known Problems Daughter     No Known Problems Daughter        SOCHX:  Social History     Tobacco Use    Smoking status: Former     Current packs/day: 0.00     Average packs/day: 0.5 packs/day for 16.0 years (8.0 ttl pk-yrs)     Types: Cigarettes     Start date: 1969     Quit date: 1985     Years since quittin.7    Smokeless tobacco: Never   Substance Use Topics    Alcohol use: Yes      Alcohol/week: 1.0 standard drink of alcohol     Types: 1 Glasses of wine per week     Comment: on occasion       ALLERGIES:  Adhesive tape-silicones and Penicillins    CURRENT MEDICATIONS:  Current Outpatient Medications on File Prior to Visit   Medication Sig Dispense Refill    aspirin (ECOTRIN) 81 MG EC tablet Take 81 mg by mouth once daily.      atorvastatin (LIPITOR) 40 MG tablet Take 40 mg by mouth once daily.      calcium carbonate (OS-PAULINE) 500 mg calcium (1,250 mg) tablet Take 1 tablet by mouth once daily.      cyanocobalamin 500 MCG tablet Vitamin B-12 500 mcg tablet   1 tablet; 500 MCG; Once a day      gabapentin (NEURONTIN) 300 MG capsule Take 300 mg by mouth every evening.      GARLIC ORAL Take by mouth.      multivitamin capsule Take 1 capsule by mouth once daily.      quinapril (ACCUPRIL) 20 MG tablet Take by mouth. 1 Tablet Oral Every day      vitamin D (VITAMIN D3) 1000 units Tab Take 1,000 Units by mouth once daily.       Current Facility-Administered Medications on File Prior to Visit   Medication Dose Route Frequency Provider Last Rate Last Admin    sodium chloride 0.9% bolus 1,000 mL  1,000 mL Intravenous Once Idalia Aguilera, ANY           REVIEW OF SYSTEMS:  ROS    Review of Systems Compete; Negative, unless noted above.    GENERAL PHYSICAL EXAM:   LMP  (LMP Unknown)    GEN: well developed, well nourished, no acute distress   HENT: Normocephalic, atraumatic   EYES: No discharge, conjunctiva normal   PULM: No wheezing, no respiratory distress   CV: Regular rate and rhythm     ORTHO EXAM:   Examination of the bilateral hands reveals no edema, erythema, ecchymosis, or skin breakdown.  Able make composite fist and fully extend all fingers.  Mild active triggering noted of the left ring finger.  No active triggering noted of the right ring finger.  Tenderness palpation overlying the areas of the A1 pulleys of the bilateral ring fingers.  No active triggering or tenderness overlying the A1 pulleys of  the remaining fingers of the bilateral hands.  Normal sensation in the radial, ulnar, median nerve distributions.  Capillary refill less than 2 seconds in all fingers.    RADIOLOGY:   None.    ASSESSMENT:   Bilateral ring finger trigger fingers    PLAN:  1. I discussed with Gricelda Hassan and her  the trigger finger pathology and treatment options in detail during today's visit.  After treatment options were discussed, we decided the best course of action this time is to proceed with a steroid injection into the flexor tendon sheath at the level of the A1 pulley of the bilateral ring fingers in clinic today.  She verbally agreed with the treatment plan.      2. Informed consent was obtained.  Attention was 1st turned to the left ring finger.  After an alcohol prep followed by a chlorhexidine prep, a steroid injection was placed into the flexor tendon sheath at the level of the A1 pulley of the left ring finger.  She tolerated the procedure well with no immediate complications.  Attention was then turned to the right ring finger.  After an alcohol prep followed by a chlorhexidine prep, a steroid injection was placed into the flexor tendon sheath at the level of the A1 pulley of the right ring finger.  She tolerated the procedure well with no immediate complications.      3. I would like him to follow up in clinic on a p.r.n. basis for any worsening of her symptoms or for any hand, wrist, or elbow problems/concerns.  She was instructed to contact clinic for any problems or concerns in the interim.

## 2023-10-09 NOTE — PROCEDURES
Tendon Sheath    Date/Time: 10/9/2023 4:00 PM    Performed by: Haris Adler PA-C  Authorized by: Haris Adler PA-C    Consent Done?:  Yes (Verbal)  Indications:  Pain  Site marked: the procedure site was marked    Timeout: prior to procedure the correct patient, procedure, and site was verified    Prep: patient was prepped and draped in usual sterile fashion      Local anesthesia used?: Yes    Local anesthetic:  Lidocaine 1% without epinephrine  Anesthetic total (ml):  0.5    Location:  Ring finger  Site:  R ring flexor tendon sheath  Ultrasonic guidance for needle placement?: No    Needle size:  25 G  Approach:  Volar  Medications:  40 mg triamcinolone acetonide 40 mg/mL (20 mg injected)  Patient tolerance:  Patient tolerated the procedure well with no immediate complications

## 2023-10-09 NOTE — PROCEDURES
Tendon Sheath    Date/Time: 10/9/2023 4:00 PM    Performed by: Haris Adler PA-C  Authorized by: Haris Adler PA-C    Consent Done?:  Yes (Verbal)  Indications:  Pain  Site marked: the procedure site was marked    Timeout: prior to procedure the correct patient, procedure, and site was verified    Prep: patient was prepped and draped in usual sterile fashion      Local anesthesia used?: Yes    Local anesthetic:  Lidocaine 1% without epinephrine  Anesthetic total (ml):  0.5    Location:  Ring finger  Site:  L ring flexor tendon sheath  Ultrasonic guidance for needle placement?: No    Needle size:  25 G  Approach:  Volar  Medications:  40 mg triamcinolone acetonide 40 mg/mL (20 mg injected)  Patient tolerance:  Patient tolerated the procedure well with no immediate complications

## 2023-10-19 NOTE — PROGRESS NOTES
PATIENT: Gricelda Hassan  MRN: [id number]  DATE: 1/3/2022      Diagnosis:   1. Macrocytosis    2. Essential hypertension    3. Hyperlipidemia, unspecified hyperlipidemia type    4. Atrial tachycardia        Chief Complaint: macrocytosis (6 month follow up )    Subjective:     Interval History: Ms. Hassan is a 71 y.o. female with HTN, HLD, Atrial Tachycardia who presents for follow up for macrocytosis.  Lab work on 7/06/21 showed normal LDH, retic, LDH, MMA, homocysteine and TSH.  Since the last clinic visit the patient continues to complain of lower back pain at the L4/L5 region which she attributes to malalignment.  The patient states she is scheduled for a cardiac ablation on February 7, 2022 and an angiogram on January 27, 2022.  The patient denies CP, cough, SOB, abdominal pain, N/V, constipation, diarrhea.  The patient denies fever, chills, night sweats, weight loss, new lumps or bumps, easy bruising or bleeding.    Past Medical History:   Past Medical History:   Diagnosis Date    Atrial tachycardia     Hyperlipidemia     Hypertension     Ischemic colitis        Past Surgical HIstory:   Past Surgical History:   Procedure Laterality Date    TUBAL LIGATION         Family History:   Family History   Problem Relation Age of Onset    COPD Mother     Heart disease Father         CABG age 60    Hypertension Brother     Neuropathy Brother     Hypertension Sister     No Known Problems Daughter     No Known Problems Daughter        Social History:  reports that she quit smoking about 37 years ago. She has a 8.00 pack-year smoking history. She has never used smokeless tobacco. She reports current alcohol use of about 1.0 standard drink of alcohol per week. She reports that she does not use drugs.    Allergies:  Review of patient's allergies indicates:   Allergen Reactions    Penicillins Rash     Yeast infection         Medications:  Current Outpatient Medications   Medication Sig Dispense Refill    aspirin 325  "MG tablet Take 162.5 mg by mouth once daily.       cyanocobalamin 500 MCG tablet Vitamin B-12 500 mcg tablet   1 tablet; 500 MCG; Once a day      gabapentin (NEURONTIN) 300 MG capsule Take 300 mg by mouth nightly.      multivitamin capsule Take 1 capsule by mouth once daily.      propafenone (RYTHMOL SR) 225 MG Cp12 propafenone  mg capsule,extended release 12 hr   TAKE 1 CAPSULE BY MOUTH TWICE DAILY AS DIRECTED      quinapril (ACCUPRIL) 20 MG tablet Take by mouth. 1 Tablet Oral Every day      rosuvastatin (CRESTOR) 20 MG tablet Take 20 mg by mouth once daily.      traMADoL (ULTRAM) 50 mg tablet Take 50 mg by mouth 3 (three) times daily as needed.       No current facility-administered medications for this visit.       Review of Systems   Constitutional: Negative for chills, fatigue, fever and unexpected weight change.   Respiratory: Negative for cough and shortness of breath.    Cardiovascular: Negative for chest pain and palpitations.   Gastrointestinal: Negative for abdominal pain, constipation, diarrhea, nausea and vomiting.   Musculoskeletal: Positive for back pain.   Skin: Negative for rash.   Neurological: Negative for headaches.   Hematological: Negative for adenopathy. Does not bruise/bleed easily.       ECOG Performance Status: 0   Objective:      Vitals:   Vitals:    01/03/22 0948   BP: 130/82   BP Location: Right arm   Patient Position: Sitting   BP Method: Medium (Manual)   Pulse: (!) 53   Temp: 96.3 °F (35.7 °C)   TempSrc: Temporal   Weight: 55.8 kg (123 lb 0.3 oz)   Height: 5' 3" (1.6 m)       Physical Exam  Constitutional:       General: She is not in acute distress.     Appearance: She is well-developed. She is not diaphoretic.   HENT:      Head: Normocephalic and atraumatic.   Cardiovascular:      Rate and Rhythm: Normal rate and regular rhythm.      Heart sounds: Normal heart sounds. No murmur heard.  No friction rub. No gallop.    Pulmonary:      Effort: Pulmonary effort is normal. No " respiratory distress.      Breath sounds: Normal breath sounds. No wheezing or rales.   Chest:      Chest wall: No tenderness.   Breasts:      Right: No axillary adenopathy or supraclavicular adenopathy.      Left: No axillary adenopathy or supraclavicular adenopathy.       Abdominal:      General: Bowel sounds are normal. There is no distension.      Palpations: Abdomen is soft. There is no mass.      Tenderness: There is no abdominal tenderness. There is no guarding or rebound.   Lymphadenopathy:      Cervical: No cervical adenopathy.      Upper Body:      Right upper body: No supraclavicular or axillary adenopathy.      Left upper body: No supraclavicular or axillary adenopathy.   Skin:     Findings: No erythema or rash.   Neurological:      Mental Status: She is alert and oriented to person, place, and time.   Psychiatric:         Behavior: Behavior normal.         Laboratory Data:  Lab Visit on 01/03/2022   Component Date Value Ref Range Status    WBC 01/03/2022 4.24  3.90 - 12.70 K/uL Final    RBC 01/03/2022 3.78* 4.00 - 5.40 M/uL Final    Hemoglobin 01/03/2022 12.7  12.0 - 16.0 g/dL Final    Hematocrit 01/03/2022 37.8  37.0 - 48.5 % Final    MCV 01/03/2022 100* 82 - 98 fL Final    MCH 01/03/2022 33.6* 27.0 - 31.0 pg Final    MCHC 01/03/2022 33.6  32.0 - 36.0 g/dL Final    RDW 01/03/2022 13.0  11.5 - 14.5 % Final    Platelets 01/03/2022 297  150 - 450 K/uL Final    MPV 01/03/2022 9.5  9.2 - 12.9 fL Final    Immature Granulocytes 01/03/2022 0.0  0.0 - 0.5 % Final    Gran # (ANC) 01/03/2022 2.3  1.8 - 7.7 K/uL Final    Immature Grans (Abs) 01/03/2022 0.00  0.00 - 0.04 K/uL Final    Comment: Mild elevation in immature granulocytes is non specific and   can be seen in a variety of conditions including stress response,   acute inflammation, trauma and pregnancy. Correlation with other   laboratory and clinical findings is essential.      Lymph # 01/03/2022 1.2  1.0 - 4.8 K/uL Final    Mono #  01/03/2022 0.4  0.3 - 1.0 K/uL Final    Eos # 01/03/2022 0.2  0.0 - 0.5 K/uL Final    Baso # 01/03/2022 0.05  0.00 - 0.20 K/uL Final    nRBC 01/03/2022 0  0 /100 WBC Final    Gran % 01/03/2022 55.0  38.0 - 73.0 % Final    Lymph % 01/03/2022 28.3  18.0 - 48.0 % Final    Mono % 01/03/2022 10.1  4.0 - 15.0 % Final    Eosinophil % 01/03/2022 5.4  0.0 - 8.0 % Final    Basophil % 01/03/2022 1.2  0.0 - 1.9 % Final    Differential Method 01/03/2022 Automated   Final    Sodium 01/03/2022 138  136 - 145 mmol/L Final    Potassium 01/03/2022 5.0  3.5 - 5.1 mmol/L Final    Chloride 01/03/2022 103  95 - 110 mmol/L Final    CO2 01/03/2022 29  23 - 29 mmol/L Final    Glucose 01/03/2022 85  70 - 110 mg/dL Final    BUN 01/03/2022 12  8 - 23 mg/dL Final    Creatinine 01/03/2022 0.8  0.5 - 1.4 mg/dL Final    Calcium 01/03/2022 9.5  8.7 - 10.5 mg/dL Final    Total Protein 01/03/2022 6.6  6.0 - 8.4 g/dL Final    Albumin 01/03/2022 3.8  3.5 - 5.2 g/dL Final    Total Bilirubin 01/03/2022 0.5  0.1 - 1.0 mg/dL Final    Comment: For infants and newborns, interpretation of results should be based  on gestational age, weight and in agreement with clinical  observations.    Premature Infant recommended reference ranges:  Up to 24 hours.............<8.0 mg/dL  Up to 48 hours............<12.0 mg/dL  3-5 days..................<15.0 mg/dL  6-29 days.................<15.0 mg/dL      Alkaline Phosphatase 01/03/2022 58  55 - 135 U/L Final    AST 01/03/2022 16  10 - 40 U/L Final    ALT 01/03/2022 13  10 - 44 U/L Final    Anion Gap 01/03/2022 6* 8 - 16 mmol/L Final    eGFR if African American 01/03/2022 >60  >60 mL/min/1.73 m^2 Final    eGFR if non African American 01/03/2022 >60  >60 mL/min/1.73 m^2 Final    Comment: Calculation used to obtain the estimated glomerular filtration  rate (eGFR) is the CKD-EPI equation.       Uric Acid 01/03/2022 3.8  2.4 - 5.7 mg/dL Final    LD 01/03/2022 277* 110 - 260 U/L Final    Results are  increased in hemolyzed samples.    WBC 01/03/2022 4.34  3.90 - 12.70 K/uL Final    RBC 01/03/2022 3.74* 4.00 - 5.40 M/uL Final    Hemoglobin 01/03/2022 12.6  12.0 - 16.0 g/dL Final    Hematocrit 01/03/2022 37.5  37.0 - 48.5 % Final    MCV 01/03/2022 100* 82 - 98 fL Final    MCH 01/03/2022 33.7* 27.0 - 31.0 pg Final    MCHC 01/03/2022 33.6  32.0 - 36.0 g/dL Final    RDW 01/03/2022 13.1  11.5 - 14.5 % Final    Platelets 01/03/2022 297  150 - 450 K/uL Final    MPV 01/03/2022 9.6  9.2 - 12.9 fL Final    Immature Granulocytes 01/03/2022 0.2  0.0 - 0.5 % Final    Gran # (ANC) 01/03/2022 2.4  1.8 - 7.7 K/uL Final    Immature Grans (Abs) 01/03/2022 0.01  0.00 - 0.04 K/uL Final    Comment: Mild elevation in immature granulocytes is non specific and   can be seen in a variety of conditions including stress response,   acute inflammation, trauma and pregnancy. Correlation with other   laboratory and clinical findings is essential.      Lymph # 01/03/2022 1.3  1.0 - 4.8 K/uL Final    Mono # 01/03/2022 0.4  0.3 - 1.0 K/uL Final    Eos # 01/03/2022 0.2  0.0 - 0.5 K/uL Final    Baso # 01/03/2022 0.05  0.00 - 0.20 K/uL Final    nRBC 01/03/2022 0  0 /100 WBC Final    Gran % 01/03/2022 54.6  38.0 - 73.0 % Final    Lymph % 01/03/2022 28.8  18.0 - 48.0 % Final    Mono % 01/03/2022 10.1  4.0 - 15.0 % Final    Eosinophil % 01/03/2022 5.1  0.0 - 8.0 % Final    Basophil % 01/03/2022 1.2  0.0 - 1.9 % Final    Differential Method 01/03/2022 Automated   Final         Imaging: Reviewed    Assessment:       1. Macrocytosis    2. Essential hypertension    3. Hyperlipidemia, unspecified hyperlipidemia type    4. Atrial tachycardia           Plan:     Macrocytosis - The patient has a long standing macrocytosis  -Laboratory assessment on 7/06/21 showed a normal MMA and homocysteine level ruling our B12 and folic acid deficiency  -Retic, LDH and haptoglobin were normal ruling out hemolysis  -TSH was normal ruling out  hypothyroidism  -The peripheral smear was non informative  -The patient likely has macrocytosis from age or from MDS  -Blood counts on 1/03/22 stable  -will repeat labs in 6 months  -If no drastic change in blood counts will consider having patient monitored by PCP with labs every 6 months    HTN - pt on quinapril  -PCP managing    HDL - pt on crestor  -Management per cardiology    Atrial Tachycardia - pt on propafenone  -Pt to have an angiogram on 1/27/22 and ablation on 2/07/22  -Management per cardiology    Follow Up - CBC, CMP, LDH, Uric acid with return visit in 6 months    Edy Dejesus MD  [facility]  Hematology and Oncology  [facility]   [address]   [place] [address]   O: [phone]  F: [phone]             Opt out

## 2023-11-06 ENCOUNTER — PATIENT MESSAGE (OUTPATIENT)
Dept: OTHER | Facility: OTHER | Age: 73
End: 2023-11-06
Payer: MEDICARE

## 2023-11-09 ENCOUNTER — PATIENT MESSAGE (OUTPATIENT)
Dept: ADMINISTRATIVE | Facility: OTHER | Age: 73
End: 2023-11-09
Payer: MEDICARE

## 2023-11-09 ENCOUNTER — PATIENT MESSAGE (OUTPATIENT)
Dept: OTHER | Facility: OTHER | Age: 73
End: 2023-11-09
Payer: MEDICARE

## 2024-02-17 ENCOUNTER — PATIENT MESSAGE (OUTPATIENT)
Dept: OBSTETRICS AND GYNECOLOGY | Facility: CLINIC | Age: 74
End: 2024-02-17
Payer: MEDICARE

## 2024-02-17 DIAGNOSIS — M81.0 OSTEOPOROSIS, UNSPECIFIED OSTEOPOROSIS TYPE, UNSPECIFIED PATHOLOGICAL FRACTURE PRESENCE: Primary | ICD-10-CM

## 2024-02-19 ENCOUNTER — PATIENT MESSAGE (OUTPATIENT)
Dept: OBSTETRICS AND GYNECOLOGY | Facility: CLINIC | Age: 74
End: 2024-02-19
Payer: MEDICARE

## 2024-02-27 ENCOUNTER — PATIENT MESSAGE (OUTPATIENT)
Dept: OBSTETRICS AND GYNECOLOGY | Facility: CLINIC | Age: 74
End: 2024-02-27
Payer: MEDICARE

## 2024-02-27 ENCOUNTER — TELEPHONE (OUTPATIENT)
Dept: OBSTETRICS AND GYNECOLOGY | Facility: CLINIC | Age: 74
End: 2024-02-27
Payer: MEDICARE

## 2024-02-27 ENCOUNTER — HOSPITAL ENCOUNTER (OUTPATIENT)
Dept: RADIOLOGY | Facility: HOSPITAL | Age: 74
Discharge: HOME | End: 2024-02-27
Attending: OBSTETRICS & GYNECOLOGY
Payer: MEDICARE

## 2024-02-27 DIAGNOSIS — M81.0 OSTEOPOROSIS, UNSPECIFIED OSTEOPOROSIS TYPE, UNSPECIFIED PATHOLOGICAL FRACTURE PRESENCE: ICD-10-CM

## 2024-02-27 PROCEDURE — 77080 DXA BONE DENSITY AXIAL: CPT | Mod: TC,PO

## 2024-02-27 PROCEDURE — 77080 DXA BONE DENSITY AXIAL: CPT | Mod: 26,,, | Performed by: RADIOLOGY

## 2024-02-28 ENCOUNTER — OFFICE VISIT (OUTPATIENT)
Dept: OBSTETRICS AND GYNECOLOGY | Facility: CLINIC | Age: 74
End: 2024-02-28
Attending: OBSTETRICS & GYNECOLOGY
Payer: MEDICARE

## 2024-02-28 VITALS
WEIGHT: 122.81 LBS | BODY MASS INDEX: 21.75 KG/M2 | SYSTOLIC BLOOD PRESSURE: 142 MMHG | DIASTOLIC BLOOD PRESSURE: 81 MMHG

## 2024-02-28 DIAGNOSIS — Z01.419 WELL WOMAN EXAM WITH ROUTINE GYNECOLOGICAL EXAM: Primary | ICD-10-CM

## 2024-02-28 PROCEDURE — G0101 CA SCREEN;PELVIC/BREAST EXAM: HCPCS | Mod: S$PBB,GZ,, | Performed by: OBSTETRICS & GYNECOLOGY

## 2024-02-28 PROCEDURE — G0101 CA SCREEN;PELVIC/BREAST EXAM: HCPCS | Mod: PBBFAC,PO | Performed by: OBSTETRICS & GYNECOLOGY

## 2024-02-28 PROCEDURE — 99212 OFFICE O/P EST SF 10 MIN: CPT | Mod: PBBFAC,PO,25 | Performed by: OBSTETRICS & GYNECOLOGY

## 2024-02-28 PROCEDURE — 99999 PR PBB SHADOW E&M-EST. PATIENT-LVL II: CPT | Mod: PBBFAC,,, | Performed by: OBSTETRICS & GYNECOLOGY

## 2024-02-28 PROCEDURE — 88175 CYTOPATH C/V AUTO FLUID REDO: CPT | Performed by: OBSTETRICS & GYNECOLOGY

## 2024-02-28 RX ORDER — TRAMADOL HYDROCHLORIDE 50 MG/1
1 TABLET ORAL EVERY 8 HOURS PRN
Status: ON HOLD | COMMUNITY
End: 2024-05-20 | Stop reason: HOSPADM

## 2024-02-28 RX ORDER — OLMESARTAN MEDOXOMIL 20 MG/1
1 TABLET ORAL DAILY
COMMUNITY
Start: 2023-07-01

## 2024-02-28 RX ORDER — CELECOXIB 200 MG/1
200 CAPSULE ORAL
Status: ON HOLD | COMMUNITY
End: 2024-05-20 | Stop reason: HOSPADM

## 2024-02-28 RX ORDER — ATORVASTATIN CALCIUM 40 MG/1
1 TABLET, FILM COATED ORAL NIGHTLY
COMMUNITY
Start: 2023-06-05

## 2024-02-28 RX ORDER — MELATONIN 3 MG
CAPSULE ORAL
COMMUNITY

## 2024-02-28 NOTE — PROGRESS NOTES
HPI:   73 y.o.   OB History          2    Para   2    Term   2            AB        Living   1         SAB        IAB        Ectopic        Multiple        Live Births   1              No LMP recorded (lmp unknown). Patient is postmenopausal.    Patient is  here for her annual gynecologic exam.  She has no complaints.     ROS:  GENERAL: No fever, chills, fatigability or weight loss.  SKIN: No rashes, itching or changes in color or texture of skin.  HEAD: No headaches or recent head trauma.  EYES: Visual acuity fine. No photophobia, ocular pain or diplopia.  EARS: Denies ear pain, discharge or vertigo.  NOSE: No loss of smell, no epistaxis or postnasal drip.  MOUTH & THROAT: No hoarseness or change in voice. No excessive gum bleeding.  NODES: Denies swollen glands.  CHEST: Denies MANCERA, cyanosis, wheezing, cough and sputum production.  CARDIOVASCULAR: Denies chest pain, PND, orthopnea or reduced exercise tolerance.  ABDOMEN: Appetite fine. No weight loss. Denies diarrhea, abdominal pain, hematemesis or blood in stool.  URINARY: No flank pain, dysuria or hematuria.  PERIPHERAL VASCULAR: No claudication or cyanosis.  MUSCULOSKELETAL: No joint stiffness or swelling. Denies back pain.  NEUROLOGIC: No history of seizures, paralysis, alteration of gait or coordination.    PE:   BP (!) 142/81   Wt 55.7 kg (122 lb 12.7 oz)   LMP  (LMP Unknown)   BMI 21.75 kg/m²   APPEARANCE: Well nourished, well developed, in no acute distress.  NECK: Neck symmetric without masses or thyromegaly.  BREASTS: Symmetrical, no skin changes or visible lesions. No palpable masses, nipple discharge or adenopathy bilaterally.  ABDOMEN: Flat. Soft. No tenderness or masses. No hepatosplenomegaly. No hernias. No CVA tenderness.  VULVA: No lesions. Normal female genitalia.  URETHRAL MEATUS: Normal size and location, no lesions, no prolapse.  URETHRA: No masses, tenderness, prolapse or scarring.  VAGINA: Moist and well rugated, no  discharge, no significant cystocele or rectocele.  CERVIX: No lesions and discharge. PAP done.  UTERUS: Normal size, regular shape, mobile, non-tender, bladder base nontender.  ADNEXA: No masses, tenderness or CDS nodularity.  ANUS PERINEUM: Normal.    PROCEDURES:  Pap smear    Assessment:  Normal Gynecologic Exam    Plan:  Mammogram and Colonoscopy if indicated by current recommendations.  Return to clinic in one year or for any problems or complaints.  No gyn co

## 2024-03-04 DIAGNOSIS — M20.12 HALLUX VALGUS WITH BUNIONS OF LEFT FOOT: Primary | ICD-10-CM

## 2024-03-04 DIAGNOSIS — M21.612 HALLUX VALGUS WITH BUNIONS OF LEFT FOOT: Primary | ICD-10-CM

## 2024-03-04 LAB
FINAL PATHOLOGIC DIAGNOSIS: NORMAL
Lab: NORMAL

## 2024-03-07 ENCOUNTER — HOSPITAL ENCOUNTER (OUTPATIENT)
Dept: RADIOLOGY | Facility: HOSPITAL | Age: 74
Discharge: HOME | End: 2024-03-07
Attending: ORTHOPAEDIC SURGERY
Payer: MEDICARE

## 2024-03-07 ENCOUNTER — OFFICE VISIT (OUTPATIENT)
Dept: ORTHOPEDICS | Facility: CLINIC | Age: 74
End: 2024-03-07
Payer: MEDICARE

## 2024-03-07 DIAGNOSIS — Z13.1 ENCOUNTER FOR SCREENING FOR DIABETES MELLITUS: ICD-10-CM

## 2024-03-07 DIAGNOSIS — I10 ESSENTIAL HYPERTENSION, MALIGNANT: Primary | ICD-10-CM

## 2024-03-07 DIAGNOSIS — Z13.1 SCREENING FOR DIABETES MELLITUS: ICD-10-CM

## 2024-03-07 DIAGNOSIS — E78.5 HYPERLIPIDEMIA: ICD-10-CM

## 2024-03-07 DIAGNOSIS — S93.129A CLOSED DISLOCATION OF METATARSOPHALANGEAL (JOINT), INITIAL ENCOUNTER: ICD-10-CM

## 2024-03-07 DIAGNOSIS — M21.612 HALLUX VALGUS WITH BUNIONS OF LEFT FOOT: ICD-10-CM

## 2024-03-07 DIAGNOSIS — Q66.211 ACQUIRED HALLUX VALGUS OF RIGHT FOOT DUE TO METATARSUS PRIMUS VARUS: Primary | ICD-10-CM

## 2024-03-07 DIAGNOSIS — M21.612 HALLUX VALGUS WITH BUNIONS OF LEFT FOOT: Primary | ICD-10-CM

## 2024-03-07 DIAGNOSIS — M20.12 HALLUX VALGUS WITH BUNIONS OF LEFT FOOT: Primary | ICD-10-CM

## 2024-03-07 DIAGNOSIS — M20.12 HALLUX VALGUS WITH BUNIONS OF LEFT FOOT: ICD-10-CM

## 2024-03-07 PROCEDURE — 99213 OFFICE O/P EST LOW 20 MIN: CPT | Mod: PBBFAC,25,PO | Performed by: ORTHOPAEDIC SURGERY

## 2024-03-07 PROCEDURE — 99999 PR PBB SHADOW E&M-EST. PATIENT-LVL III: CPT | Mod: PBBFAC,,, | Performed by: ORTHOPAEDIC SURGERY

## 2024-03-07 PROCEDURE — 73630 X-RAY EXAM OF FOOT: CPT | Mod: TC,PO,LT

## 2024-03-07 PROCEDURE — 99214 OFFICE O/P EST MOD 30 MIN: CPT | Mod: S$PBB,,, | Performed by: ORTHOPAEDIC SURGERY

## 2024-03-07 PROCEDURE — 73630 X-RAY EXAM OF FOOT: CPT | Mod: 26,LT,, | Performed by: RADIOLOGY

## 2024-03-19 ENCOUNTER — LAB VISIT (OUTPATIENT)
Dept: LAB | Facility: HOSPITAL | Age: 74
End: 2024-03-19
Attending: INTERNAL MEDICINE
Payer: MEDICARE

## 2024-03-19 DIAGNOSIS — E78.5 HYPERLIPIDEMIA: ICD-10-CM

## 2024-03-19 DIAGNOSIS — I10 ESSENTIAL HYPERTENSION, MALIGNANT: ICD-10-CM

## 2024-03-19 DIAGNOSIS — Z13.1 SCREENING FOR DIABETES MELLITUS: ICD-10-CM

## 2024-03-19 DIAGNOSIS — Z13.1 ENCOUNTER FOR SCREENING FOR DIABETES MELLITUS: ICD-10-CM

## 2024-03-19 LAB
ALBUMIN SERPL BCP-MCNC: 3.8 G/DL (ref 3.5–5.2)
ALP SERPL-CCNC: 68 U/L (ref 55–135)
ALT SERPL W/O P-5'-P-CCNC: 20 U/L (ref 10–44)
ANION GAP SERPL CALC-SCNC: 4 MMOL/L (ref 8–16)
AST SERPL-CCNC: 25 U/L (ref 10–40)
BASOPHILS # BLD AUTO: 0.03 K/UL (ref 0–0.2)
BASOPHILS NFR BLD: 0.7 % (ref 0–1.9)
BILIRUB SERPL-MCNC: 0.7 MG/DL (ref 0.1–1)
BUN SERPL-MCNC: 10 MG/DL (ref 8–23)
CALCIUM SERPL-MCNC: 9.2 MG/DL (ref 8.7–10.5)
CHLORIDE SERPL-SCNC: 105 MMOL/L (ref 95–110)
CHOLEST SERPL-MCNC: 170 MG/DL (ref 120–199)
CHOLEST/HDLC SERPL: 2.1 {RATIO} (ref 2–5)
CO2 SERPL-SCNC: 28 MMOL/L (ref 23–29)
CREAT SERPL-MCNC: 0.8 MG/DL (ref 0.5–1.4)
DIFFERENTIAL METHOD BLD: ABNORMAL
EOSINOPHIL # BLD AUTO: 0.3 K/UL (ref 0–0.5)
EOSINOPHIL NFR BLD: 6.1 % (ref 0–8)
ERYTHROCYTE [DISTWIDTH] IN BLOOD BY AUTOMATED COUNT: 13.2 % (ref 11.5–14.5)
EST. GFR  (NO RACE VARIABLE): >60 ML/MIN/1.73 M^2
ESTIMATED AVG GLUCOSE: 97 MG/DL (ref 68–131)
GLUCOSE SERPL-MCNC: 94 MG/DL (ref 70–110)
HBA1C MFR BLD: 5 % (ref 4–5.6)
HCT VFR BLD AUTO: 39 % (ref 37–48.5)
HDLC SERPL-MCNC: 80 MG/DL (ref 40–75)
HDLC SERPL: 47.1 % (ref 20–50)
HGB BLD-MCNC: 13 G/DL (ref 12–16)
IMM GRANULOCYTES # BLD AUTO: 0 K/UL (ref 0–0.04)
IMM GRANULOCYTES NFR BLD AUTO: 0 % (ref 0–0.5)
LDLC SERPL CALC-MCNC: 79.2 MG/DL (ref 63–159)
LYMPHOCYTES # BLD AUTO: 1.5 K/UL (ref 1–4.8)
LYMPHOCYTES NFR BLD: 37.6 % (ref 18–48)
MCH RBC QN AUTO: 34.1 PG (ref 27–31)
MCHC RBC AUTO-ENTMCNC: 33.3 G/DL (ref 32–36)
MCV RBC AUTO: 102 FL (ref 82–98)
MONOCYTES # BLD AUTO: 0.4 K/UL (ref 0.3–1)
MONOCYTES NFR BLD: 9.3 % (ref 4–15)
NEUTROPHILS # BLD AUTO: 1.9 K/UL (ref 1.8–7.7)
NEUTROPHILS NFR BLD: 46.3 % (ref 38–73)
NONHDLC SERPL-MCNC: 90 MG/DL
NRBC BLD-RTO: 0 /100 WBC
PLATELET # BLD AUTO: 304 K/UL (ref 150–450)
PMV BLD AUTO: 10.5 FL (ref 9.2–12.9)
POTASSIUM SERPL-SCNC: 5.1 MMOL/L (ref 3.5–5.1)
PROT SERPL-MCNC: 6.9 G/DL (ref 6–8.4)
RBC # BLD AUTO: 3.81 M/UL (ref 4–5.4)
SODIUM SERPL-SCNC: 137 MMOL/L (ref 136–145)
TRIGL SERPL-MCNC: 54 MG/DL (ref 30–150)
WBC # BLD AUTO: 4.1 K/UL (ref 3.9–12.7)

## 2024-03-19 PROCEDURE — 80061 LIPID PANEL: CPT | Performed by: INTERNAL MEDICINE

## 2024-03-19 PROCEDURE — 36415 COLL VENOUS BLD VENIPUNCTURE: CPT | Mod: PO | Performed by: INTERNAL MEDICINE

## 2024-03-19 PROCEDURE — 80053 COMPREHEN METABOLIC PANEL: CPT | Performed by: INTERNAL MEDICINE

## 2024-03-19 PROCEDURE — 85025 COMPLETE CBC W/AUTO DIFF WBC: CPT | Performed by: INTERNAL MEDICINE

## 2024-03-19 PROCEDURE — 83036 HEMOGLOBIN GLYCOSYLATED A1C: CPT | Performed by: INTERNAL MEDICINE

## 2024-03-25 ENCOUNTER — PATIENT MESSAGE (OUTPATIENT)
Dept: ORTHOPEDICS | Facility: CLINIC | Age: 74
End: 2024-03-25
Payer: MEDICARE

## 2024-03-31 RX ORDER — MUPIROCIN 20 MG/G
OINTMENT TOPICAL
Status: CANCELLED | OUTPATIENT
Start: 2024-03-31

## 2024-03-31 RX ORDER — CLINDAMYCIN PHOSPHATE 600 MG/50ML
600 INJECTION, SOLUTION INTRAVENOUS
Status: CANCELLED | OUTPATIENT
Start: 2024-03-31

## 2024-04-01 NOTE — PROGRESS NOTES
Status/Diagnosis: Left hallux valgus with crossover toe deformity; 2nd MTP dorsal dislocation; 2nd metatarsalgia; rigid 2nd hammertoe  Date of Surgery: 02/01/2023  Date of Injury: none  Return visit: 1 week postop  X-rays on Return: NWB 3-views Left foot XOP    Present History:  Gricelda Hassan is a 73 y.o. female who returns today with complaints of recurrent pain and deformity involving the left forefoot.    At baseline patient exercises regularly.  Walk 3-4 miles per day.  Bikes 10+ miles.      Physical exam:  There were no vitals filed for this visit.  There is no height or weight on file to calculate BMI.  General: In no apparent distress; well developed and well nourished.  HEENT: normocephalic; atraumatic.  Cardiovascular: regular rate.  Respiratory: no increased work of breathing.  Musculoskeletal:   Gait: nonantalgic  Inspection:  Surgical sites are well healed.  Moderate recurrence of hallux valgus.  No tenderness at the great toe proximal phalanx with deep palpation.    Mild tenderness over the 2nd MTP joint plantarly on the left.  No pain with 2nd toe motion.  No tenderness on deep palpation over the 1st metatarsal shaft on the left.    On the right, patient has mild-to-moderate hallux valgus.  Rigid 2nd hammertoe deformity with early crossover 2nd over 1st deformity.       Imaging Studies/Outside documentation:  I have ordered/reviewed/interpreted the following images/outside documentation:  WB 3-views Left feet:   Status post hallux valgus correction with 1st TMT joint fusion and proximal phalangeal osteotomy.  Also with 2nd MTP relocation and hammertoe correction.  Some degree of recurrent 2nd MTP dorsal subluxation 1st hallux valgus.  No evidence of implant loosening or failure.  First TMT fusion site and great toe proximal phalangeal osteotomy site are well healed.    Previously noted area of cortical thickening with what appears to be a stress fracture involving the 1st metatarsal mid diaphysis  --  now well healed.  No tenderness on clinical exam.       Assessment:  Gricelda Hassan is a 73 y.o. female with Left hallux valgus with crossover toe deformity; 2nd MTP dorsal dislocation; 2nd metatarsalgia; rigid 2nd hammertoe.     Plan:   Clinical and radiographic findings were discussed. Operative vs nonoperative treatment options were described. These include but are not limited to bleeding; infection; damage to surrounding nerves or vessels; persistent pain, stiffness; nonunion; malunion; recurrent deformity; need for additional procedures; amputation; blood clots; pulmonary embolus; cardiac events; stroke; and the general risks of anesthesia including anesthetic death.   We also reviewed postop protocol as well as limitations and expectations. Patient's symptoms have persisted despite conservative management to include but not limited to shoe wear and activity modifications; anti-inflammatories; and continue to affect the patient's quality of life. Patient understands and desires to proceed with surgical intervention.   Explained risks, benefits, and alternative to the patient. Asked if any questions--none.  Surgery to include but not limited to:   Revision left hallux valgus correction; 2nd metatarsophalangeal joint relocation; surgery as indicated.    Specifically discussed goals of salvage procedure is to gain and maintain correction. Should hallux valgus recur despite the above, patient would likely require a 1st MTP joint fusion procedure for maintained alignment.      This note was created using voice recognition software and may contain grammatical errors.

## 2024-05-09 ENCOUNTER — ANESTHESIA EVENT (OUTPATIENT)
Dept: SURGERY | Facility: HOSPITAL | Age: 74
End: 2024-05-09
Payer: MEDICARE

## 2024-05-13 ENCOUNTER — ANESTHESIA (OUTPATIENT)
Dept: SURGERY | Facility: HOSPITAL | Age: 74
End: 2024-05-13
Payer: MEDICARE

## 2024-05-13 ENCOUNTER — PATIENT MESSAGE (OUTPATIENT)
Dept: ORTHOPEDICS | Facility: CLINIC | Age: 74
End: 2024-05-13
Payer: MEDICARE

## 2024-05-20 ENCOUNTER — TELEPHONE (OUTPATIENT)
Dept: ORTHOPEDICS | Facility: CLINIC | Age: 74
End: 2024-05-20
Payer: MEDICARE

## 2024-05-20 ENCOUNTER — HOSPITAL ENCOUNTER (OUTPATIENT)
Facility: HOSPITAL | Age: 74
Discharge: HOME | End: 2024-05-20
Attending: ORTHOPAEDIC SURGERY | Admitting: ORTHOPAEDIC SURGERY
Payer: MEDICARE

## 2024-05-20 DIAGNOSIS — Q66.211 ACQUIRED HALLUX VALGUS OF RIGHT FOOT DUE TO METATARSUS PRIMUS VARUS: ICD-10-CM

## 2024-05-20 PROCEDURE — 37000009 HC ANESTHESIA EA ADD 15 MINS: Mod: PO | Performed by: ORTHOPAEDIC SURGERY

## 2024-05-20 PROCEDURE — 63600175 PHARM REV CODE 636 W HCPCS: Mod: PO | Performed by: ANESTHESIOLOGY

## 2024-05-20 PROCEDURE — 63600175 PHARM REV CODE 636 W HCPCS: Mod: PO | Performed by: NURSE ANESTHETIST, CERTIFIED REGISTERED

## 2024-05-20 PROCEDURE — 63600175 PHARM REV CODE 636 W HCPCS: Mod: JZ,JG,PO | Performed by: ANESTHESIOLOGY

## 2024-05-20 PROCEDURE — D9220A PRA ANESTHESIA: Mod: ANES,,, | Performed by: ANESTHESIOLOGY

## 2024-05-20 PROCEDURE — D9220A PRA ANESTHESIA: Mod: CRNA,,, | Performed by: NURSE ANESTHETIST, CERTIFIED REGISTERED

## 2024-05-20 PROCEDURE — 71000015 HC POSTOP RECOV 1ST HR: Mod: PO | Performed by: ORTHOPAEDIC SURGERY

## 2024-05-20 PROCEDURE — 63600175 PHARM REV CODE 636 W HCPCS: Mod: JZ,JG,PO | Performed by: ORTHOPAEDIC SURGERY

## 2024-05-20 PROCEDURE — C9290 INJ, BUPIVACAINE LIPOSOME: HCPCS | Mod: PO | Performed by: ANESTHESIOLOGY

## 2024-05-20 PROCEDURE — 28645 REPAIR TOE DISLOCATION: CPT | Mod: 59,51,T1, | Performed by: ORTHOPAEDIC SURGERY

## 2024-05-20 PROCEDURE — 37000008 HC ANESTHESIA 1ST 15 MINUTES: Mod: PO | Performed by: ORTHOPAEDIC SURGERY

## 2024-05-20 PROCEDURE — 36000709 HC OR TIME LEV III EA ADD 15 MIN: Mod: PO | Performed by: ORTHOPAEDIC SURGERY

## 2024-05-20 PROCEDURE — C1713 ANCHOR/SCREW BN/BN,TIS/BN: HCPCS | Mod: PO | Performed by: ORTHOPAEDIC SURGERY

## 2024-05-20 PROCEDURE — 36000708 HC OR TIME LEV III 1ST 15 MIN: Mod: PO | Performed by: ORTHOPAEDIC SURGERY

## 2024-05-20 PROCEDURE — 27200750 HC INSULATED NEEDLE/ STIMUPLEX: Mod: PO | Performed by: ANESTHESIOLOGY

## 2024-05-20 PROCEDURE — 64447 NJX AA&/STRD FEMORAL NRV IMG: CPT | Mod: PO,59,LT | Performed by: ANESTHESIOLOGY

## 2024-05-20 PROCEDURE — 25000003 PHARM REV CODE 250: Mod: PO | Performed by: ORTHOPAEDIC SURGERY

## 2024-05-20 PROCEDURE — 27201423 OPTIME MED/SURG SUP & DEVICES STERILE SUPPLY: Mod: PO | Performed by: ORTHOPAEDIC SURGERY

## 2024-05-20 PROCEDURE — 25000003 PHARM REV CODE 250: Mod: PO | Performed by: ANESTHESIOLOGY

## 2024-05-20 PROCEDURE — 64447 NJX AA&/STRD FEMORAL NRV IMG: CPT | Mod: 59,LT,, | Performed by: ANESTHESIOLOGY

## 2024-05-20 PROCEDURE — 25000003 PHARM REV CODE 250: Mod: PO | Performed by: NURSE ANESTHETIST, CERTIFIED REGISTERED

## 2024-05-20 PROCEDURE — 71000033 HC RECOVERY, INTIAL HOUR: Mod: PO | Performed by: ORTHOPAEDIC SURGERY

## 2024-05-20 PROCEDURE — 28296 COR HLX VLGS DSTL MTAR OSTEO: CPT | Mod: 22,TA,, | Performed by: ORTHOPAEDIC SURGERY

## 2024-05-20 PROCEDURE — 64450 NJX AA&/STRD OTHER PN/BRANCH: CPT | Mod: 59,LT,, | Performed by: ANESTHESIOLOGY

## 2024-05-20 PROCEDURE — 76942 ECHO GUIDE FOR BIOPSY: CPT | Mod: 26,,, | Performed by: ANESTHESIOLOGY

## 2024-05-20 DEVICE — SYS IMPLANT FOREFOOT IB PEEK: Type: IMPLANTABLE DEVICE | Site: FOOT | Status: FUNCTIONAL

## 2024-05-20 DEVICE — IMPLANTABLE DEVICE: Type: IMPLANTABLE DEVICE | Site: FOOT | Status: FUNCTIONAL

## 2024-05-20 RX ORDER — OXYCODONE HYDROCHLORIDE 5 MG/1
5 TABLET ORAL
Status: DISCONTINUED | OUTPATIENT
Start: 2024-05-20 | End: 2024-05-20 | Stop reason: HOSPADM

## 2024-05-20 RX ORDER — ONDANSETRON HYDROCHLORIDE 8 MG/1
8 TABLET, FILM COATED ORAL EVERY 12 HOURS PRN
Qty: 20 TABLET | Refills: 0 | Status: SHIPPED | OUTPATIENT
Start: 2024-05-20 | End: 2024-05-30

## 2024-05-20 RX ORDER — HYDROMORPHONE HYDROCHLORIDE 2 MG/ML
0.2 INJECTION, SOLUTION INTRAMUSCULAR; INTRAVENOUS; SUBCUTANEOUS EVERY 5 MIN PRN
Status: ACTIVE | OUTPATIENT
Start: 2024-05-20

## 2024-05-20 RX ORDER — MIDAZOLAM HYDROCHLORIDE 1 MG/ML
0.5 INJECTION, SOLUTION INTRAMUSCULAR; INTRAVENOUS
Status: DISCONTINUED | OUTPATIENT
Start: 2024-05-20 | End: 2024-05-20 | Stop reason: HOSPADM

## 2024-05-20 RX ORDER — FENTANYL CITRATE 50 UG/ML
25 INJECTION, SOLUTION INTRAMUSCULAR; INTRAVENOUS EVERY 5 MIN PRN
Status: ACTIVE | OUTPATIENT
Start: 2024-05-20

## 2024-05-20 RX ORDER — ONDANSETRON HYDROCHLORIDE 2 MG/ML
INJECTION, SOLUTION INTRAVENOUS
Status: DISCONTINUED | OUTPATIENT
Start: 2024-05-20 | End: 2024-05-20

## 2024-05-20 RX ORDER — ACETAMINOPHEN 500 MG
1000 TABLET ORAL
Status: COMPLETED | OUTPATIENT
Start: 2024-05-20 | End: 2024-05-20

## 2024-05-20 RX ORDER — MEPERIDINE HYDROCHLORIDE 50 MG/ML
12.5 INJECTION INTRAMUSCULAR; INTRAVENOUS; SUBCUTANEOUS ONCE
Status: ACTIVE | OUTPATIENT
Start: 2024-05-20 | End: 2024-05-21

## 2024-05-20 RX ORDER — LIDOCAINE HYDROCHLORIDE 10 MG/ML
1 INJECTION, SOLUTION EPIDURAL; INFILTRATION; INTRACAUDAL; PERINEURAL ONCE
Status: DISCONTINUED | OUTPATIENT
Start: 2024-05-20 | End: 2024-05-20 | Stop reason: HOSPADM

## 2024-05-20 RX ORDER — ROCURONIUM BROMIDE 10 MG/ML
INJECTION, SOLUTION INTRAVENOUS
Status: DISCONTINUED | OUTPATIENT
Start: 2024-05-20 | End: 2024-05-20

## 2024-05-20 RX ORDER — DEXAMETHASONE SODIUM PHOSPHATE 4 MG/ML
8 INJECTION, SOLUTION INTRA-ARTICULAR; INTRALESIONAL; INTRAMUSCULAR; INTRAVENOUS; SOFT TISSUE
Status: COMPLETED | OUTPATIENT
Start: 2024-05-20 | End: 2024-05-20

## 2024-05-20 RX ORDER — CLINDAMYCIN PHOSPHATE 600 MG/50ML
600 INJECTION, SOLUTION INTRAVENOUS
Status: COMPLETED | OUTPATIENT
Start: 2024-05-20 | End: 2024-05-20

## 2024-05-20 RX ORDER — DIPHENHYDRAMINE HYDROCHLORIDE 50 MG/ML
25 INJECTION INTRAMUSCULAR; INTRAVENOUS EVERY 6 HOURS PRN
Status: ACTIVE | OUTPATIENT
Start: 2024-05-20

## 2024-05-20 RX ORDER — LIDOCAINE HYDROCHLORIDE 20 MG/ML
INJECTION INTRAVENOUS
Status: DISCONTINUED | OUTPATIENT
Start: 2024-05-20 | End: 2024-05-20

## 2024-05-20 RX ORDER — ROPIVACAINE HYDROCHLORIDE 5 MG/ML
INJECTION, SOLUTION EPIDURAL; INFILTRATION; PERINEURAL
Status: DISCONTINUED | OUTPATIENT
Start: 2024-05-20 | End: 2024-05-20

## 2024-05-20 RX ORDER — FENTANYL CITRATE 50 UG/ML
INJECTION, SOLUTION INTRAMUSCULAR; INTRAVENOUS
Status: DISCONTINUED | OUTPATIENT
Start: 2024-05-20 | End: 2024-05-20

## 2024-05-20 RX ORDER — PROPOFOL 10 MG/ML
VIAL (ML) INTRAVENOUS
Status: DISCONTINUED | OUTPATIENT
Start: 2024-05-20 | End: 2024-05-20

## 2024-05-20 RX ORDER — FENTANYL CITRATE 50 UG/ML
25 INJECTION, SOLUTION INTRAMUSCULAR; INTRAVENOUS EVERY 5 MIN PRN
Status: DISCONTINUED | OUTPATIENT
Start: 2024-05-20 | End: 2024-05-20 | Stop reason: HOSPADM

## 2024-05-20 RX ORDER — SODIUM CHLORIDE 0.9 % (FLUSH) 0.9 %
10 SYRINGE (ML) INJECTION ONCE
Status: DISPENSED | OUTPATIENT
Start: 2024-05-20

## 2024-05-20 RX ORDER — OXYCODONE AND ACETAMINOPHEN 5; 325 MG/1; MG/1
1 TABLET ORAL
Qty: 42 TABLET | Refills: 0 | Status: SHIPPED | OUTPATIENT
Start: 2024-05-20 | End: 2024-05-27

## 2024-05-20 RX ORDER — LIDOCAINE HYDROCHLORIDE 10 MG/ML
1 INJECTION, SOLUTION EPIDURAL; INFILTRATION; INTRACAUDAL; PERINEURAL ONCE
Status: ACTIVE | OUTPATIENT
Start: 2024-05-20

## 2024-05-20 RX ORDER — MIDAZOLAM HYDROCHLORIDE 1 MG/ML
0.5 INJECTION, SOLUTION INTRAMUSCULAR; INTRAVENOUS
Status: ACTIVE | OUTPATIENT
Start: 2024-05-20

## 2024-05-20 RX ORDER — SODIUM CHLORIDE, SODIUM LACTATE, POTASSIUM CHLORIDE, CALCIUM CHLORIDE 600; 310; 30; 20 MG/100ML; MG/100ML; MG/100ML; MG/100ML
INJECTION, SOLUTION INTRAVENOUS CONTINUOUS
Status: DISPENSED | OUTPATIENT
Start: 2024-05-20

## 2024-05-20 RX ORDER — HYDROMORPHONE HYDROCHLORIDE 2 MG/ML
0.2 INJECTION, SOLUTION INTRAMUSCULAR; INTRAVENOUS; SUBCUTANEOUS EVERY 5 MIN PRN
Status: DISCONTINUED | OUTPATIENT
Start: 2024-05-20 | End: 2024-05-20 | Stop reason: HOSPADM

## 2024-05-20 RX ORDER — PROCHLORPERAZINE EDISYLATE 5 MG/ML
5 INJECTION INTRAMUSCULAR; INTRAVENOUS EVERY 4 HOURS PRN
Status: ACTIVE | OUTPATIENT
Start: 2024-05-20

## 2024-05-20 RX ORDER — OXYCODONE HYDROCHLORIDE 5 MG/1
5 TABLET ORAL
Status: ACTIVE | OUTPATIENT
Start: 2024-05-20

## 2024-05-20 RX ORDER — BUPIVACAINE HYDROCHLORIDE 5 MG/ML
INJECTION, SOLUTION EPIDURAL; INTRACAUDAL
Status: DISCONTINUED | OUTPATIENT
Start: 2024-05-20 | End: 2024-05-20

## 2024-05-20 RX ORDER — CELECOXIB 200 MG/1
400 CAPSULE ORAL
Status: COMPLETED | OUTPATIENT
Start: 2024-05-20 | End: 2024-05-20

## 2024-05-20 RX ORDER — SODIUM CHLORIDE, SODIUM LACTATE, POTASSIUM CHLORIDE, CALCIUM CHLORIDE 600; 310; 30; 20 MG/100ML; MG/100ML; MG/100ML; MG/100ML
INJECTION, SOLUTION INTRAVENOUS CONTINUOUS
Status: DISCONTINUED | OUTPATIENT
Start: 2024-05-20 | End: 2024-05-20 | Stop reason: HOSPADM

## 2024-05-20 RX ORDER — MUPIROCIN 20 MG/G
OINTMENT TOPICAL
Status: DISCONTINUED | OUTPATIENT
Start: 2024-05-20 | End: 2024-05-20 | Stop reason: HOSPADM

## 2024-05-20 RX ADMIN — BUPIVACAINE HYDROCHLORIDE 10 ML: 5 INJECTION, SOLUTION EPIDURAL; INTRACAUDAL; PERINEURAL at 08:05

## 2024-05-20 RX ADMIN — SODIUM CHLORIDE, POTASSIUM CHLORIDE, SODIUM LACTATE AND CALCIUM CHLORIDE: 600; 310; 30; 20 INJECTION, SOLUTION INTRAVENOUS at 07:05

## 2024-05-20 RX ADMIN — SODIUM CHLORIDE, POTASSIUM CHLORIDE, SODIUM LACTATE AND CALCIUM CHLORIDE: 600; 310; 30; 20 INJECTION, SOLUTION INTRAVENOUS at 10:05

## 2024-05-20 RX ADMIN — PROPOFOL 120 MG: 10 INJECTION, EMULSION INTRAVENOUS at 08:05

## 2024-05-20 RX ADMIN — MUPIROCIN: 20 OINTMENT TOPICAL at 07:05

## 2024-05-20 RX ADMIN — BUPIVACAINE 10 ML: 13.3 INJECTION, SUSPENSION, LIPOSOMAL INFILTRATION at 08:05

## 2024-05-20 RX ADMIN — FENTANYL CITRATE 50 MCG: 50 INJECTION, SOLUTION INTRAMUSCULAR; INTRAVENOUS at 10:05

## 2024-05-20 RX ADMIN — FENTANYL CITRATE 50 MCG: 50 INJECTION, SOLUTION INTRAMUSCULAR; INTRAVENOUS at 08:05

## 2024-05-20 RX ADMIN — DEXAMETHASONE SODIUM PHOSPHATE 8 MG: 4 INJECTION INTRA-ARTICULAR; INTRALESIONAL; INTRAMUSCULAR; INTRAVENOUS; SOFT TISSUE at 07:05

## 2024-05-20 RX ADMIN — ONDANSETRON 4 MG: 2 INJECTION, SOLUTION INTRAMUSCULAR; INTRAVENOUS at 11:05

## 2024-05-20 RX ADMIN — MIDAZOLAM 2 MG: 1 INJECTION INTRAMUSCULAR; INTRAVENOUS at 08:05

## 2024-05-20 RX ADMIN — CELECOXIB 400 MG: 200 CAPSULE ORAL at 07:05

## 2024-05-20 RX ADMIN — ACETAMINOPHEN 1000 MG: 500 TABLET ORAL at 07:05

## 2024-05-20 RX ADMIN — LIDOCAINE HYDROCHLORIDE 75 MG: 20 INJECTION INTRAVENOUS at 08:05

## 2024-05-20 RX ADMIN — FENTANYL CITRATE 50 MCG: 50 INJECTION INTRAMUSCULAR; INTRAVENOUS at 08:05

## 2024-05-20 RX ADMIN — ROCURONIUM BROMIDE 35 MG: 10 INJECTION, SOLUTION INTRAVENOUS at 08:05

## 2024-05-20 RX ADMIN — CLINDAMYCIN IN 5 PERCENT DEXTROSE 600 MG: 12 INJECTION, SOLUTION INTRAVENOUS at 07:05

## 2024-05-20 RX ADMIN — ROPIVACAINE HYDROCHLORIDE 30 ML: 5 INJECTION, SOLUTION EPIDURAL; INFILTRATION; PERINEURAL at 08:05

## 2024-05-20 NOTE — TELEPHONE ENCOUNTER
----- Message from Tommy Miranda sent at 5/20/2024  2:23 PM CDT -----  Type:  Pharmacy Calling to Clarify an RX    Name of Caller:  Anna Napier  Pharmacy Name:   [id number] - [place] [address] AT [facility] & [address] [address]  Phone: [phone] Fax: [phone]        Prescription Name:  oxyCODONE-acetaminophen (PERCOCET) 5-325 mg per tablet  What do they need to clarify?:  Pt also taking Tramadol  Best Call Back Number:  [phone]  Additional Information:

## 2024-05-20 NOTE — ANESTHESIA PROCEDURE NOTES
Peripheral Block    Patient location during procedure: pre-op   Block not for primary anesthetic.  Reason for block: at surgeon's request and post-op pain management   Post-op Pain Location: left foot   Start time: 5/20/2024 7:55 AM  Timeout: 5/20/2024 7:55 AM   End time: 5/20/2024 8:01 AM    Staffing  Authorizing Provider: Thomas Duval MD  Performing Provider: Thomas Duval MD    Staffing  Performed by: Thomas Duval MD  Authorized by: Thomas Duval MD    Preanesthetic Checklist  Completed: patient identified, IV checked, site marked, risks and benefits discussed, surgical consent, monitors and equipment checked, pre-op evaluation and timeout performed  Peripheral Block  Patient position: supine  Prep: ChloraPrep  Patient monitoring: heart rate, cardiac monitor, continuous pulse ox, continuous capnometry and frequent blood pressure checks  Block type: popliteal  Laterality: left  Injection technique: single shot  Needle  Needle type: Stimuplex   Needle gauge: 21 G  Needle length: 4 in  Needle localization: anatomical landmarks and ultrasound guidance   -ultrasound image captured on disc.  Assessment  Injection assessment: negative aspiration, negative parasthesia and local visualized surrounding nerve  Paresthesia pain: none  Heart rate change: no  Slow fractionated injection: yes  Pain Tolerance: comfortable throughout block and no complaints      Additional Notes  VSS.  [facility] RN monitoring vitals throughout procedure.  Patient tolerated procedure well.

## 2024-05-20 NOTE — ANESTHESIA POSTPROCEDURE EVALUATION
Anesthesia Post Evaluation    Patient: Gricelda Hassan    Procedure(s) Performed: Procedure(s) (LRB):  BUNIONECTOMY, WITH METATARSAL OSTEOTOMY (Left)  OPEN TREATMENT, DISLOCATION, MTP JOINT (Left)  REMOVAL, HARDWARE, FOOT (Left)    Final Anesthesia Type: general      Patient location during evaluation: PACU  Patient participation: Yes- Able to Participate  Level of consciousness: awake and alert  Post-procedure vital signs: reviewed and stable  Pain management: adequate  Airway patency: patent    PONV status at discharge: No PONV  Anesthetic complications: no      Cardiovascular status: blood pressure returned to baseline  Respiratory status: unassisted and room air  Hydration status: euvolemic  Follow-up not needed.              Vitals Value Taken Time   /60 05/20/24 1300   Temp 36.4 °C (97.5 °F) 05/20/24 1234   Pulse 72 05/20/24 1300   Resp 15 05/20/24 1300   SpO2 97 % 05/20/24 1300         Event Time   Out of Recovery 13:04:00         Pain/Tay Score: Pain Rating Prior to Med Admin: 0 (5/20/2024  8:05 AM)  Pain Rating Post Med Admin: 0 (5/20/2024  8:07 AM)  Tay Score: 10 (5/20/2024 12:45 PM)

## 2024-05-20 NOTE — ANESTHESIA PREPROCEDURE EVALUATION
05/20/2024  Gricelda Hassan is a 73 y.o., female.      Pre-op Assessment    I have reviewed the Patient Summary Reports.     I have reviewed the Nursing Notes. I have reviewed the NPO Status.   I have reviewed the Medications.     Review of Systems  Social:  Former Smoker       Cardiovascular:  Exercise tolerance: good   Hypertension    Dysrhythmias          ECG has been reviewed. Ablation for SVT - stable -no anticoagulants Functional Capacity good / => 4 METS                         Pulmonary:  Pulmonary Normal                       Hepatic/GI:  Hepatic/GI Normal                 Musculoskeletal:     Left foot            Neurological:  Neurology Normal                                      Endocrine:  Endocrine Normal                Physical Exam  General: Well nourished    Airway:  Mallampati: II   Neck ROM: Normal ROM    Dental:  Intact    Chest/Lungs:  Clear to auscultation, Normal Respiratory Rate    Heart:  Rate: Normal  Rhythm: Regular Rhythm        Anesthesia Plan  Type of Anesthesia, risks & benefits discussed:    Anesthesia Type: Gen ETT  Intra-op Monitoring Plan: Standard ASA Monitors  Post Op Pain Control Plan: IV/PO Opioids PRN, peripheral nerve block and multimodal analgesia  Induction:  IV  Informed Consent: Informed consent signed with the Patient and all parties understand the risks and agree with anesthesia plan.  All questions answered.   ASA Score: 2    Ready For Surgery From Anesthesia Perspective.     .

## 2024-05-20 NOTE — PLAN OF CARE
Left popliteal & Left Adductor canal single shot block complete per Dr. Duval with Anesthesia. Exparel band placed to pt's wrist. Pt tolerated well. See Anesthesia record for procedural notes. See flowsheet and MAR for vitals and medications. Monitors in place, alarms audible. VSS. etCO2 monitoring in place. Resp even, unlabored. Skin warm, dry. Pt in NAD. Pt awaiting transport to OR. Family at bedside. Bed in lowest, locked position. Side rails up x2. Call light within reach.

## 2024-05-20 NOTE — ANESTHESIA PROCEDURE NOTES
Intubation    Date/Time: 5/20/2024 8:32 AM    Performed by: Mayte Boswell CRNA  Authorized by: Thomas Duval MD    Intubation:     Induction:  Intravenous    Intubated:  Postinduction    Mask Ventilation:  Easy mask    Attempts:  1    Attempted By:  CRNA    Method of Intubation:  Video laryngoscopy    Blade:  Saldaña 3    Laryngeal View Grade: Grade I - full view of cords      Difficult Airway Encountered?: No      Complications:  None    Airway Device:  Oral endotracheal tube    Airway Device Size:  7.0    Style/Cuff Inflation:  Cuffed (inflated to minimal occlusive pressure)    Tube secured:  21    Secured at:  The lips    Placement Verified By:  Capnometry    Complicating Factors:  None    Findings Post-Intubation:  BS equal bilateral and atraumatic/condition of teeth unchanged

## 2024-05-20 NOTE — BRIEF OP NOTE
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 Also with significant scarring of the 1st MTPJ lateral ligament complex.  [employer] MIS set. 4.0 and 3.0mm partially threaded cannulated screws.  Arthrex Forefoot internal brace system.    Estimated Blood Loss: * No values recorded between 5/20/2024  8:51 AM and 5/20/2024 12:26 PM *         Specimens:   Specimen (24h ago, onward)      None

## 2024-05-20 NOTE — ANESTHESIA PROCEDURE NOTES
Peripheral Block    Patient location during procedure: pre-op   Block not for primary anesthetic.  Reason for block: at surgeon's request and post-op pain management   Post-op Pain Location: left foot   Start time: 5/20/2024 8:10 AM  Timeout: 5/20/2024 8:10 AM   End time: 5/20/2024 8:15 AM    Staffing  Authorizing Provider: Thomas Duval MD  Performing Provider: Thomas Duval MD    Staffing  Performed by: Thomas Duval MD  Authorized by: Thomas Duval MD    Preanesthetic Checklist  Completed: patient identified, IV checked, site marked, risks and benefits discussed, surgical consent, monitors and equipment checked, pre-op evaluation and timeout performed  Peripheral Block  Patient position: supine  Prep: ChloraPrep  Patient monitoring: heart rate, cardiac monitor, continuous pulse ox, continuous capnometry and frequent blood pressure checks  Block type: adductor canal  Laterality: left  Injection technique: single shot  Needle  Needle type: Stimuplex   Needle gauge: 21 G  Needle length: 4 in  Needle localization: anatomical landmarks and ultrasound guidance   -ultrasound image captured on disc.  Assessment  Injection assessment: negative aspiration, negative parasthesia and local visualized surrounding nerve  Paresthesia pain: none  Heart rate change: no  Slow fractionated injection: yes  Pain Tolerance: no complaints and comfortable throughout block      Additional Notes  VSS.  [facility] RN monitoring vitals throughout procedure.  Patient tolerated procedure well.

## 2024-05-21 ENCOUNTER — PATIENT MESSAGE (OUTPATIENT)
Dept: ORTHOPEDICS | Facility: CLINIC | Age: 74
End: 2024-05-21
Payer: MEDICARE

## 2024-05-21 VITALS
HEIGHT: 63 IN | OXYGEN SATURATION: 97 % | DIASTOLIC BLOOD PRESSURE: 60 MMHG | TEMPERATURE: 98 F | SYSTOLIC BLOOD PRESSURE: 111 MMHG | WEIGHT: 122.81 LBS | HEART RATE: 72 BPM | BODY MASS INDEX: 21.76 KG/M2 | RESPIRATION RATE: 15 BRPM

## 2024-05-21 NOTE — OP NOTE
Date of Surgery: 05/20/2024    Pre-Operative Diagnosis:  Left hallux valgus.  Left 2nd metatarsophalangeal joint dorsal subluxation.  Retained deep orthopedic implants, Left foot.    Post-Operative Diagnosis:  Left hallux valgus.  Left 2nd metatarsophalangeal joint dorsal subluxation.  Retained deep orthopedic implants, Left foot.    Procedures:  Revision Left hallux valgus correction with 1st metatarsal osteotomy and lateral soft tissue release. 82429.  Open relocation of Left 2nd metatarsophalangeal joint dislocation. 36930.  Removal of deep orthopedic implants, Left foot. 20680.    A 22 modifier was applied to the hallux valgus correction portion of the case due to 45 additional minutes being required for significant scar tissue debridement and lateral soft tissue release.   This prolonged the operative time and required increased need for technical surgical skills.       Surgeon: Javier Hammonds MD    Assist: ERROL Jay.    Anesthesia: General with regional block.    Estimated Blood Loss: <5mL.    Drains: None.    Findings: Significant scar tissue present at the 2nd MTP with grade 1-2 chondromalacia. Mild flattening. Also with significant scarring of the 1st MTPJ lateral ligament complex.     Implants: [employer] MIS set. 4.0 and 3.0mm partially threaded cannulated screws. Arthrex Forefoot internal brace system.    Operative Indication:  Gricelda Hassan is a 73 y.o. female who underwent a Lapidus type hallux valgus correction with Akin osteotomy and 2nd hammertoe correction with 2nd metatarsophalangeal joint relocation 1+ year ago.  Since that time has had progressive pain and mild worsening deformity.  She states she was exhausted conservative treatment measures.    Operative versus non operative treatment options were discussed.  Risks and benefits were described.  These include but are not limited to bleeding; infection; damage to surrounding nerves or vessels; persistent pain, stiffness; nonunion;  malunion; need for additional procedures; amputation; blood clots; pulmonary embolus; cardiac events; stroke; and the general risks of anesthesia including anesthetic death.   Specifically discussed that revision hallux valgus correction was a salvage procedure that should she continue to have pain would require something along the lines of a great toe fusion.  We also reviewed postop protocol as well as limitations and expectations. Patient understands and desires to proceed with surgical intervention. Consent was obtained and the left foot was marked.    Operative Procedure:  The patient was transferred to the operating room, transferred to the OR table in a supine position then placed under general endotracheal anesthesia by the anesthesiology service. All bony prominences were appropriately padded.  Patient was secured to the table.  A nonsterile tourniquet was placed on the left thigh.  An SCD was placed on the contralateral lower limb.  The patient was prepped and draped in usual sterile fashion.  A time-out was then called.  The patient, procedure, surgical site was verified and everyone was in agreement.  Preoperative IV antibiotics were administered. The surgical extremity was exsanguinated with an esmarch bandage and the tourniquet was inflated to 250mmHg.  All previous surgical scars were marked out.  Using a #15 blade, the previously noted dorsal medial incision over the 1st TMT joint was utilized.  This was taken down deep to the level of the more dorsal of the previously placed Nitinol staples.  Using a combination osteotome and the staple placement jig, this was removed without complication.    Attention was then turned toward the minimally invasive portion of the procedure.  Planned osteotomy at the head/neck junction was marked out. An MIS elida was used to make a transverse osteotomy, taking care to protect the skin throughout.  This was completed the metatarsal head was able to be mobilized.   Somewhat limited correction was noted.  He was at this time we elected to perform a extensive lateral soft tissue release.    A separate 4 cm incision was made over the 2nd ray.  Soft tissues were mobilized to allow access to the lateral margin of the 1st MTP joint and adjacent sesamoid suspensory ligament.  He was both released to allow for better correction of the lateral deviation of the great toe.    The jig was placed to allow for lateral shift of the distal metatarsal head fragment.  After satisfactory position was confirmed, guidewires x2 were then placed through jig.  These were noted to be slightly plantar but in acceptable position and thus avoiding the remaining staple at the 1st metatarsal base.  Good screw purchase was able to be obtained.  The MIS bur was then used resect the prominence of the 1st metatarsal diaphysis.  This was then tamped down to allow for bone healing.  Mild residual medial eminence prominence was noted and also burred down accordingly.    Attention was then turned back to the previously made incision over the 2nd ray.  Significant scar tissue was noted.  Repeat EDL tendon lengthening was performed.  Tendons were then tagged for later repair.  Thick capsular scarring was noted of the 2nd MTP joint.  Extensive debridement was required.  Grade 1/2 chondromalacia was noted with subtle flattening of the head.  At this time we attempted to identify plate which was absent/compromised.  We would like to proceed with the forefoot internal brace portion of the.  Guidewires x2 were placed in the 2nd metatarsal and proximal phalanx accordingly.  Once confirming satisfactory position.  These were overdrilled.  FiberTape was placed through the bone tunnels and initially secured within the 2nd metatarsal using a tenodesis screw.  While holding the 2nd toe in appropriate position in slight plantar flexion, a 2nd tenodesis screw was placed within proximal phalanx.  Overall maintained 2nd toe  position with satisfactory range of motion was appreciated.  The tourniquet was let down and good hemostasis was obtained.  All wounds were copiously irrigated and closed in standard layered fashion including 3-0 Vicryl for the 2nd EDL tendon repair, 2-0 Vicryl deep, 3-0 Monocryl subcuticularly, and 3-0 nylon on the skin.  Sterile dressings including Xeroform, 4 x 4 gauze, Webril, and a well-padded short leg splint in neutral dorsiflexion.  Patient was reversed from anesthesia and transferred to recovery in stable condition.    Javier Hammonds MD

## 2024-05-22 DIAGNOSIS — Q66.211 ACQUIRED HALLUX VALGUS OF RIGHT FOOT DUE TO METATARSUS PRIMUS VARUS: ICD-10-CM

## 2024-05-22 DIAGNOSIS — M20.12 HALLUX VALGUS WITH BUNIONS OF LEFT FOOT: ICD-10-CM

## 2024-05-22 DIAGNOSIS — S93.129A CLOSED DISLOCATION OF METATARSOPHALANGEAL (JOINT), INITIAL ENCOUNTER: Primary | ICD-10-CM

## 2024-05-22 DIAGNOSIS — M21.612 HALLUX VALGUS WITH BUNIONS OF LEFT FOOT: ICD-10-CM

## 2024-05-27 DIAGNOSIS — M20.12 HALLUX VALGUS WITH BUNIONS OF LEFT FOOT: Primary | ICD-10-CM

## 2024-05-27 DIAGNOSIS — M21.612 HALLUX VALGUS WITH BUNIONS OF LEFT FOOT: Primary | ICD-10-CM

## 2024-06-04 ENCOUNTER — HOSPITAL ENCOUNTER (OUTPATIENT)
Dept: RADIOLOGY | Facility: HOSPITAL | Age: 74
Discharge: HOME | End: 2024-06-04
Attending: ORTHOPAEDIC SURGERY
Payer: MEDICARE

## 2024-06-04 ENCOUNTER — OFFICE VISIT (OUTPATIENT)
Dept: ORTHOPEDICS | Facility: CLINIC | Age: 74
End: 2024-06-04
Payer: MEDICARE

## 2024-06-04 DIAGNOSIS — M21.612 HALLUX VALGUS WITH BUNIONS OF LEFT FOOT: ICD-10-CM

## 2024-06-04 DIAGNOSIS — M21.612 HALLUX VALGUS WITH BUNIONS OF LEFT FOOT: Primary | ICD-10-CM

## 2024-06-04 DIAGNOSIS — M20.12 HALLUX VALGUS WITH BUNIONS OF LEFT FOOT: Primary | ICD-10-CM

## 2024-06-04 DIAGNOSIS — M20.12 HALLUX VALGUS WITH BUNIONS OF LEFT FOOT: ICD-10-CM

## 2024-06-04 PROCEDURE — 73630 X-RAY EXAM OF FOOT: CPT | Mod: 26,LT,, | Performed by: RADIOLOGY

## 2024-06-04 PROCEDURE — 73630 X-RAY EXAM OF FOOT: CPT | Mod: TC,PO,LT

## 2024-06-04 PROCEDURE — 99024 POSTOP FOLLOW-UP VISIT: CPT | Mod: POP,,, | Performed by: ORTHOPAEDIC SURGERY

## 2024-06-04 NOTE — PROGRESS NOTES
Status/Diagnosis: Left hallux valgus with crossover toe deformity; 2nd MTP dorsal dislocation; 2nd metatarsalgia; rigid 2nd hammertoe  Date of Surgery: 02/01/2023; 05/20/2024  Date of Injury: none  Return visit: 06/13/2024  X-rays on Return: none    Present History:  Gricelda Hassan is a 73 y.o. female who returns today for 1st postop clinic visit.  Overall doing well.  5/10 pain.  Primary complaint is irritation along the anterior tibia related to knee scooter use.    Also with some degree of paresthesias involving the medial midfoot/forefoot.  Only taking Tylenol for pain.  She is taking aspirin as prescribed for DVT prophylaxis.  Denies any drainage, fever, chills.    At baseline patient exercises regularly.  Walk 3-4 miles per day.  Bikes 10+ miles.      Physical exam:  There were no vitals filed for this visit.  There is no height or weight on file to calculate BMI.  General: In no apparent distress; well developed and well nourished.  HEENT: normocephalic; atraumatic.  Cardiovascular: regular rate.  Respiratory: no increased work of breathing.  Musculoskeletal:   Gait:  Did not assess  Inspection:  Overall alignment well-maintained status post hallux valgus correction and relocation of 2nd toe at the level of the MTP joint.    Mild residual swelling and ecchymosis to be expected.  No residual laxity of the 2nd MTP joint.  Still some mild prominence involving the dorsal medial aspect of the 1st metatarsal head however I reiterated to patient that resecting this would cause significant bone loss.    Some altered sensation along the dorsal medial foot.  Otherwise gross motor and sensory function intact.  Palpable pedal pulse.     Imaging Studies/Outside documentation:  I have ordered/reviewed/interpreted the following images/outside documentation:  NWB 3-views Left feet:   As previously noted, patient is status post hallux valgus correction with 1st TMT joint fusion and proximal phalangeal osteotomy.    Now  patient was status post distal 1st metatarsal osteotomy with lateral translation and screw fixation.  Overall alignment well-maintained.  No evidence of implant loosening or failure.  Also status post open relocation of the 2nd MTP joint.  No evidence of residual subluxation however there is moderate joint space narrowing present.       Assessment:  Gricelda Hassan is a 73 y.o. female with Left hallux valgus with crossover toe deformity; 2nd MTP dorsal dislocation; 2nd metatarsalgia; rigid 2nd hammertoe.     Plan:   Clinical and radiographic findings were discussed.   Majority of sutures were removed today and Steri-Strips were placed.  Patient to be placed back into a well-padded short-leg plaster splint.    She is to remain strict nonweightbearing left lower extremity.    Continue aspirin for DVT prophylaxis.    Patient voiced understanding.  All questions were answered.  Return to clinic on 06/13 for repeat evaluation and wound check.  No new x-rays.      Patient did mention upcoming beach trip on 06/22.  We will discuss possible transition to boot at next clinic visit.      This note was created using voice recognition software and may contain grammatical errors.

## 2024-06-12 ENCOUNTER — PATIENT MESSAGE (OUTPATIENT)
Dept: ORTHOPEDICS | Facility: CLINIC | Age: 74
End: 2024-06-12
Payer: MEDICARE

## 2024-06-13 ENCOUNTER — OFFICE VISIT (OUTPATIENT)
Dept: ORTHOPEDICS | Facility: CLINIC | Age: 74
End: 2024-06-13
Payer: MEDICARE

## 2024-06-13 VITALS — WEIGHT: 122.81 LBS | BODY MASS INDEX: 21.76 KG/M2 | HEIGHT: 63 IN

## 2024-06-13 DIAGNOSIS — M20.12 HALLUX VALGUS WITH BUNIONS OF LEFT FOOT: Primary | ICD-10-CM

## 2024-06-13 DIAGNOSIS — M21.612 HALLUX VALGUS WITH BUNIONS OF LEFT FOOT: Primary | ICD-10-CM

## 2024-06-13 PROCEDURE — 99213 OFFICE O/P EST LOW 20 MIN: CPT | Mod: PBBFAC,PO | Performed by: ORTHOPAEDIC SURGERY

## 2024-06-13 PROCEDURE — 99024 POSTOP FOLLOW-UP VISIT: CPT | Mod: POP,,, | Performed by: ORTHOPAEDIC SURGERY

## 2024-06-13 PROCEDURE — 99999 PR PBB SHADOW E&M-EST. PATIENT-LVL III: CPT | Mod: PBBFAC,,, | Performed by: ORTHOPAEDIC SURGERY

## 2024-06-13 NOTE — PROGRESS NOTES
Status/Diagnosis: Left hallux valgus with crossover toe deformity; 2nd MTP dorsal dislocation; 2nd metatarsalgia; rigid 2nd hammertoe  Date of Surgery: 02/01/2023; 05/20/2024  Date of Injury: none  Return visit: 06/13/2024  X-rays on Return: none    Present History:  Gricelda Hassan is a 73 y.o. female who returns today for routine postoperative visit and wound check.  Overall doing well.  0/10 pain.  Previously noted paresthesias involving the medial midfoot/forefoot are moderately improved. Only taking Tylenol for pain.  She is taking aspirin as prescribed for DVT prophylaxis.  Denies any drainage, fever, chills.  Upcoming beach trip from 6/22 through 6/29.    At baseline patient exercises regularly.  Walk 3-4 miles per day.  Bikes 10+ miles.      Physical exam:  There were no vitals filed for this visit.  Body mass index is 21.75 kg/m².  General: In no apparent distress; well developed and well nourished.  HEENT: normocephalic; atraumatic.  Cardiovascular: regular rate.  Respiratory: no increased work of breathing.  Musculoskeletal:   Gait:  Did not assess  Inspection:  Overall alignment well-maintained status post hallux valgus correction and relocation of 2nd toe at the level of the MTP joint.    Mild residual swelling to be expected.  No residual laxity of the 2nd MTP joint.  Still some mild prominence involving the dorsal medial aspect of the 1st metatarsal head however I reiterated to patient that resecting this would cause significant bone loss.    Some altered sensation along the dorsal medial foot but improved versus 1st postop clinic visit.  Otherwise gross motor and sensory function intact.  Palpable pedal pulse.     Imaging Studies/Outside documentation:  I have ordered/reviewed/interpreted the following images/outside documentation:  No new imaging today.       Assessment:  Gricelda Hassan is a 73 y.o. female with Left hallux valgus with crossover toe deformity; 2nd MTP dorsal dislocation; 2nd  metatarsalgia; rigid 2nd hammertoe.     Plan:   Remaining nylon sutures removed and Steri-Strips placed.    We will allow patient to transition from the cast to a previously obtained short boot.    She was to remain strict nonweightbearing left lower extremity.    May remove for hygiene and home ankle range of motion exercises only.    Recommend continued wear even while sleeping.    Continue aspirin for DVT prophylaxis.  As patient we will be going to the beach as noted in HPI, return to clinic on 07/02 for repeat clinical evaluation and x-ray.  Likely initiation of slow progressive weight-bearing at that time.        This note was created using voice recognition software and may contain grammatical errors.

## 2024-07-02 ENCOUNTER — HOSPITAL ENCOUNTER (OUTPATIENT)
Dept: RADIOLOGY | Facility: HOSPITAL | Age: 74
Discharge: HOME | End: 2024-07-02
Attending: ORTHOPAEDIC SURGERY
Payer: MEDICARE

## 2024-07-02 ENCOUNTER — OFFICE VISIT (OUTPATIENT)
Dept: ORTHOPEDICS | Facility: CLINIC | Age: 74
End: 2024-07-02
Payer: MEDICARE

## 2024-07-02 DIAGNOSIS — M20.12 HALLUX VALGUS WITH BUNIONS OF LEFT FOOT: Primary | ICD-10-CM

## 2024-07-02 DIAGNOSIS — M20.12 HALLUX VALGUS WITH BUNIONS OF LEFT FOOT: ICD-10-CM

## 2024-07-02 DIAGNOSIS — S93.129A CLOSED DISLOCATION OF METATARSOPHALANGEAL (JOINT), INITIAL ENCOUNTER: ICD-10-CM

## 2024-07-02 DIAGNOSIS — M21.612 HALLUX VALGUS WITH BUNIONS OF LEFT FOOT: ICD-10-CM

## 2024-07-02 DIAGNOSIS — M21.612 HALLUX VALGUS WITH BUNIONS OF LEFT FOOT: Primary | ICD-10-CM

## 2024-07-02 PROCEDURE — 73630 X-RAY EXAM OF FOOT: CPT | Mod: TC,PO,LT

## 2024-07-02 PROCEDURE — 99024 POSTOP FOLLOW-UP VISIT: CPT | Mod: POP,,, | Performed by: ORTHOPAEDIC SURGERY

## 2024-07-02 PROCEDURE — 73630 X-RAY EXAM OF FOOT: CPT | Mod: 26,LT,, | Performed by: RADIOLOGY

## 2024-07-02 PROCEDURE — 99211 OFF/OP EST MAY X REQ PHY/QHP: CPT | Mod: PBBFAC,25,PO | Performed by: ORTHOPAEDIC SURGERY

## 2024-07-02 PROCEDURE — 99999 PR PBB SHADOW E&M-EST. PATIENT-LVL I: CPT | Mod: PBBFAC,,, | Performed by: ORTHOPAEDIC SURGERY

## 2024-07-02 NOTE — PROGRESS NOTES
Status/Diagnosis: Left hallux valgus with crossover toe deformity; 2nd MTP dorsal dislocation; 2nd metatarsalgia; rigid 2nd hammertoe  Date of Surgery: 02/01/2023; 05/20/2024  Date of Injury: none  Return visit: 1 month  X-rays on Return: WB 3-views Left foot    Present History:  Gricelda Hassan is a 73 y.o. female who returns today for routine postoperative visit.  Overall doing well.  0/10 pain.  Reports compliance with nonweightbearing status.  Wearing her boot as instructed.  Still with some pins and needles paresthesias at the end of the day but this is short in duration.      At baseline patient exercises regularly.  Walk 3-4 miles per day.  Bikes 10+ miles.      Physical exam:  There were no vitals filed for this visit.  There is no height or weight on file to calculate BMI.  General: In no apparent distress; well developed and well nourished.  HEENT: normocephalic; atraumatic.  Cardiovascular: regular rate.  Respiratory: no increased work of breathing.  Musculoskeletal:   Gait:  Did not assess  Inspection:  Overall alignment well-maintained status post hallux valgus correction and relocation of 2nd toe at the level of the MTP joint.    Little to no residual swelling.  No residual laxity of the 2nd MTP joint.  Still some mild prominence involving the dorsal medial aspect of the 1st metatarsal head however I reiterated to patient that resecting this would cause significant bone loss.    1st MTP range of motion from 10° dorsiflexion to 20° plantar flexion.  Second MTP range of motion from neutral to 10° plantar flexion.  Still with some degree of altered sensation along the dorsal medial midfoot but improved.  Otherwise gross motor and sensory function intact.  Palpable pedal pulse.     Imaging Studies/Outside documentation:  I have ordered/reviewed/interpreted the following images/outside documentation:  NWB 3-views Left foot:  As previously noted, patient is status post hallux valgus correction with 1st  TMT joint fusion and proximal phalangeal osteotomy.    Now patient status post distal 1st metatarsal osteotomy with lateral translation and screw fixation.  Overall alignment well-maintained.  No evidence of implant loosening or failure.  Questionable early signs of bony bridging at the 1st metatarsal osteotomy site.  Also status post open relocation of the 2nd MTP joint.  No evidence of residual subluxation however there is moderate joint space narrowing present.       Assessment:  Gricelda Hassan is a 73 y.o. female with Left hallux valgus with crossover toe deformity; 2nd MTP dorsal dislocation; 2nd metatarsalgia; rigid 2nd hammertoe.     Plan:   Patient continues to do extremely well from a clinical standpoint with overall alignment well-maintained.    We will initiate slow progressive weight-bearing.  Patient will be heel weight-bearing in her boot with a walker for 2 weeks followed by full weight-bearing in the boot for an additional 2 weeks.    Patient voiced understanding.  All questions were answered.  Return to clinic in 1 month for repeat evaluation and x-ray.      This note was created using voice recognition software and may contain grammatical errors.

## 2024-07-03 ENCOUNTER — CLINICAL SUPPORT (OUTPATIENT)
Dept: REHABILITATION | Facility: HOSPITAL | Age: 74
End: 2024-07-03
Attending: ORTHOPAEDIC SURGERY
Payer: MEDICARE

## 2024-07-03 DIAGNOSIS — S93.129A CLOSED DISLOCATION OF METATARSOPHALANGEAL (JOINT), INITIAL ENCOUNTER: ICD-10-CM

## 2024-07-03 DIAGNOSIS — R26.9 GAIT ABNORMALITY: ICD-10-CM

## 2024-07-03 DIAGNOSIS — M25.672 DECREASED RANGE OF MOTION OF LEFT ANKLE: Primary | ICD-10-CM

## 2024-07-03 DIAGNOSIS — M21.612 HALLUX VALGUS WITH BUNIONS OF LEFT FOOT: ICD-10-CM

## 2024-07-03 DIAGNOSIS — M20.12 HALLUX VALGUS WITH BUNIONS OF LEFT FOOT: ICD-10-CM

## 2024-07-03 PROCEDURE — 97161 PT EVAL LOW COMPLEX 20 MIN: CPT | Mod: PO

## 2024-07-03 PROCEDURE — 97530 THERAPEUTIC ACTIVITIES: CPT | Mod: PO

## 2024-07-03 NOTE — PLAN OF CARE
[facility]   Physical Therapy Initial Evaluation      Name: Gricelda Kevin [facility]  Clinic Number: [id number]    Therapy Diagnosis:   Encounter Diagnoses   Name Primary?    Hallux valgus with bunions of left foot     Closed dislocation of metatarsophalangeal (joint), initial encounter     Decreased range of motion of left ankle Yes    Gait abnormality         Physician: Javier Hammonds MD    Physician Orders: PT Eval and Treat   Medical Diagnosis from Referral:   M20.12,M21.612 (ICD-10-CM) - Hallux valgus with bunions of left foot   S93.129A (ICD-10-CM) - Closed dislocation of metatarsophalangeal (joint), initial encounter     Evaluation Date: 7/3/2024  Authorization Period Expiration: 7/2/2025  Plan of Care Expiration: 8/28/2024  Progress Note Due: 8/3/2024  Date of Surgery: 5/20/2024 bunionectomy with metatarsal osteotomy  Visit # / Visits authorized: 1/ 1   FOTO: 1/ 3    Precautions:   1 times per week for 6 to 8 weeks per fracture protocol.  PT to start week of 07/02/2024.   Heel weight-bearing only in boot week 1 AND week 2; Full WB in boot week 3 and 4.   Okay for active and active assist 1st MTP joint range of motion.  Active range of motion only at the 2nd MTP joint.  Stay in boot until next Ortho clinic visit.     Time In: 1400  Time Out: 1450  Total Billable Time: 50 minutes    Subjective     Date of onset: 5/20/2024    History of current condition - Gricelda reports: Having a L bunionectomy back in Feb of 2023. She reports that the bunion grew back, requiring her 2nd toe to cross over her toe, requiring surgery to fix the dislocation and cross over of her 2nd toe. She denies any complications since surgery and has been heel walking only in her CAM boot with a rolling walker.    Falls: None    Imaging: See EPIC:     Prior Therapy: None  Social History: Lives with    Occupation: retired  Prior Level of Function: ind  Current Level of Function: ind    Pain:  Current 0/10,  worst 5/10, best 0/10   Location: incision site    Description: Burning  Aggravating Factors: NA  Easing Factors: desensitization    Patients goals: improve function     Medical History:   Past Medical History:   Diagnosis Date    Atrial tachycardia     Hyperlipidemia     Hypertension     Ischemic colitis        Surgical History:   Gricelda Hassan  has a past surgical history that includes Tubal ligation; Lapidus bunionectomy (Left, 2/1/2023); open treatment, dislocation, mtp joint (Left, 2/1/2023); Correction of hammer toe (Left, 2/1/2023); Surgical removal of metatarsal head (Left, 2/1/2023); Surgical removal of bunion with osteotomy of metatarsal bone (Left, 5/20/2024); open treatment, dislocation, mtp joint (Left, 5/20/2024); and Foot hardware removal (Left, 5/20/2024).    Medications:   Gricelda has a current medication list which includes the following prescription(s): aspirin, atorvastatin, calcium carbonate, cyanocobalamin, gabapentin, melatonin, olmesartan, and vitamin d, and the following Facility-Administered Medications: diphenhydramine, fentanyl, hydromorphone (pf), lactated ringers, lidocaine (pf) 10 mg/ml (1%), midazolam, oxycodone, prochlorperazine, sodium chloride 0.9%, and sodium chloride 0.9%.    Allergies:   Review of patient's allergies indicates:   Allergen Reactions    Adhesive tape-silicones Other (See Comments)    Penicillins Rash     Yeast infection          Objective      Observation: Patient ambulates with CAM boot to left lower extremity with rolling walker and Wb'ing through the heel on LLE    Proximal/distal screen:     Active Range of Motion(*= pain):   Ankle Left   DF (knee extended) 5 deg from neutral   Plantarflexion 55 deg   Inversion 40 deg   Eversion 15 deg      1st toe ext: 25 deg     Strength Assessment deferred due to heel only WB status. Will assess at later date. All grossly at least 3/5      Palpation: No TTP    Sensation: SILT    Flexibility: decreased in L  ankle      Gait: pt heel weight bearing through the L with CAM boot donned with use of rolling walker for support      Intake Outcome Measure for FOTO foot Survey    Therapist reviewed FOTO scores for Gricelda Hassan on 7/3/2024.   FOTO report - see Media section or FOTO account episode details.    Intake Score: 59% limit         Treatment     Total Treatment time (time-based codes) separate from Evaluation: 15 minutes     Gricelda received the treatments listed below:        therapeutic activities to improve functional performance for 15  minutes, including:  Education on HEP to include:  Ankle ABCs Ankle pumps  DF towel slide  Toe yoga      Patient Education and Home Exercises     Education provided:   - On HEP and POC    Written Home Exercises Provided: yes. Exercises were reviewed and Grcielda was able to demonstrate them prior to the end of the session.  Gricelda demonstrated good  understanding of the education provided. See EMR under Patient Instructions for exercises provided during therapy sessions.    Assessment     Gricelda is a 73 y.o. female referred to outpatient Physical Therapy with a medical diagnosis of   M20.12,M21.612 (ICD-10-CM) - Hallux valgus with bunions of left foot   S93.129A (ICD-10-CM) - Closed dislocation of metatarsophalangeal (joint), initial encounter    Patient presents with increased pain and decreased range of motion, strength, and flexibility. These deficits limit the patient from performing everyday activities to include walking, standing, bending, jumping, jogging, and navigating stairs without pain or limitations. Pt appears to be healing well along post op timelines at this time. Due to WB'ing precautions, strength assessment was deferred to a later date, but pt with notable range of motion deficits at this time that will benefit from intrinsic strengthening and active range of motion going forward. Pt was administered a HEP to address these deficits and demonstrated good  understanding and performance. Due to these deficits, pt will benefit from skilled PT services to improve mobility, control pain, and restore overall function.        Patient prognosis is Good.   Patient will benefit from skilled outpatient Physical Therapy to address the deficits stated above and in the chart below, provide patient /family education, and to maximize patientt's level of independence.     Plan of care discussed with patient: Yes  Patient's spiritual, cultural and educational needs considered and patient is agreeable to the plan of care and goals as stated below:     Anticipated Barriers for therapy: post op    Medical Necessity is demonstrated by the following  History  Co-morbidities and personal factors that may impact the plan of care [x] LOW: no personal factors / co-morbidities  [] MODERATE: 1-2 personal factors / co-morbidities  [] HIGH: 3+ personal factors / co-morbidities    Moderate / High Support Documentation:   Co-morbidities affecting plan of care:     Personal Factors:   no deficits     Examination  Body Structures and Functions, activity limitations and participation restrictions that may impact the plan of care [x] LOW: addressing 1-2 elements  [] MODERATE: 3+ elements  [] HIGH: 4+ elements (please support below)    Moderate / High Support Documentation:      Clinical Presentation [x] LOW: stable  [] MODERATE: Evolving  [] HIGH: Unstable     Decision Making/ Complexity Score: low       Goals:  Short Term Goals: 4 weeks   Pt to report worst pain 3/10 to improve QOL  Pt to improve L ankle DF range of motion to neutral  Pt to improve FOTO by 10%  Pt to demo independence with initial HEP  Pt to tolerate strength assessment    Long Term Goals: 8 weeks   Pt to report worst pain 1/10 to improve QOL  Pt to improve L ankle DF range of motion to 5 deg past neutral  Pt to improve FOTO by 30%  Pt to demo independence with final HEP  Additional goals to come  Plan     Plan of care Certification:  7/3/2024 to 8/28/2024.    Outpatient Physical Therapy 1 times weekly for 8 weeks to include the following interventions: Gait Training, Manual Therapy, Moist Heat/ Ice, Neuromuscular Re-ed, Patient Education, Self Care, Therapeutic Activities, and Therapeutic Exercise.     Gio Cade PT        Physician's Signature: _________________________________________ Date: ________________

## 2024-07-10 ENCOUNTER — CLINICAL SUPPORT (OUTPATIENT)
Dept: REHABILITATION | Facility: HOSPITAL | Age: 74
End: 2024-07-10
Payer: MEDICARE

## 2024-07-10 DIAGNOSIS — M79.672 LEFT FOOT PAIN: Primary | ICD-10-CM

## 2024-07-10 PROCEDURE — 97112 NEUROMUSCULAR REEDUCATION: CPT | Mod: PO

## 2024-07-10 NOTE — PROGRESS NOTES
[facility]   Physical Therapy Treatment Note     Name: Gricelda Kevin [facility]  Clinic Number: [id number]    Therapy Diagnosis:   Encounter Diagnosis   Name Primary?    Left foot pain Yes     Physician: Javier Hammonds MD    Visit Date: 7/10/2024       Physician Orders: PT Eval and Treat   Medical Diagnosis from Referral:   M20.12,M21.612 (ICD-10-CM) - Hallux valgus with bunions of left foot   S93.129A (ICD-10-CM) - Closed dislocation of metatarsophalangeal (joint), initial encounter      Evaluation Date: 7/3/2024  Authorization Period Expiration: 7/2/2025  Plan of Care Expiration: 8/28/2024  Progress Note Due: 8/3/2024  Date of Surgery: 5/20/2024 bunionectomy with metatarsal osteotomy  Visit # / Visits authorized: 1/ 20  FOTO: 1/ 3     Precautions:   1 times per week for 6 to 8 weeks per fracture protocol.  PT to start week of 07/02/2024.   Heel weight-bearing only in boot week 1 AND week 2; Full WB in boot week 3 and 4.   Okay for active and active assist 1st MTP joint range of motion.  Active range of motion only at the 2nd MTP joint.  Stay in boot until next Ortho clinic visit.     PTA Visit #: 0/5     Time In: 1100  Time Out: 1154  Total Billable Time: 54 minutes    SUBJECTIVE     Pt reports: feeling good overall and is without new complaints today.  She was compliant with home exercise program.      Pain: 0/10  Location: left foot     OBJECTIVE     Proximal/distal screen:      Active Range of Motion(*= pain):   Ankle Left   DF (knee extended) 5 deg from neutral   Plantarflexion 55 deg   Inversion 40 deg   Eversion 15 deg      1st toe ext: 25 deg      Strength Assessment deferred due to heel only WB status. Will assess at later date. All grossly at least 3/5        Palpation: No TTP     Sensation: SILT     Flexibility: decreased in L ankle        Gait: pt heel weight bearing through the L with CAM boot donned with use of rolling walker for support        Intake Outcome Measure for FOTO  foot Survey     Therapist reviewed FOTO scores for Gricelda Hassan on 7/3/2024.   FOTO report - see Media section or FOTO account episode details.     Intake Score: 59% limit          Treatment     Gricelda received the treatments listed below:      therapeutic exercises to develop  for  minutes including:      manual therapy techniques:  were applied to the:  for  minutes, including:      neuromuscular re-education activities to improve: Coordination, Kinesthetic, Sense, Proprioception, and Posture for 54 minutes. The following activities were included:  Ankle ABCs Ankle pumps  DF towel slide  Toe yoga  Banded PF/DF c RTB 3x10  Banded inv/ev c RTB 3x10  BKFO c rtb 3x10 x 2 sec hold  Banded bridges WB through heels 3x10 c RTB  Weight shifting in cam boot x5 min    therapeutic activities to improve functional performance for   minutes, including:      gait training to improve functional mobility and safety for   minutes, including:      Patient Education and Home Exercises     Home Exercises Provided and Patient Education Provided     Education provided:   - on WB status and POC    Written Home Exercises Provided: yes. Exercises were reviewed and Gricelda was able to demonstrate them prior to the end of the session.  Gricelda demonstrated good  understanding of the education provided. See EMR under Patient Instructions for exercises provided during therapy sessions    ASSESSMENT     First time follow up performed today. [employer] BLE mat level strengthening as well as introduction to weight shifting in CAM boot prior to initiation of FWB in boot next week. Pt with great tolerance to treatment today wit no adverse reactions. Patient will continue to benefit from loading and progressions to tolerance going forward to improve overall function.    Gricelda Is progressing well towards her goals.   Pt prognosis is Good.     Pt will continue to benefit from skilled outpatient physical therapy to address the  deficits listed in the problem list box on initial evaluation, provide pt/family education and to maximize pt's level of independence in the home and community environment.     Pt's spiritual, cultural and educational needs considered and pt agreeable to plan of care and goals.     Anticipated barriers to physical therapy: none    Goals:   Short Term Goals: 4 weeks   Pt to report worst pain 3/10 to improve QOL  Pt to improve L ankle DF range of motion to neutral  Pt to improve FOTO by 10%  Pt to demo independence with initial HEP  Pt to tolerate strength assessment     Long Term Goals: 8 weeks   Pt to report worst pain 1/10 to improve QOL  Pt to improve L ankle DF range of motion to 5 deg past neutral  Pt to improve FOTO by 30%  Pt to demo independence with final HEP  Additional goals to come    PLAN     Plan of care Certification: 7/3/2024 to 8/28/2024.     Outpatient Physical Therapy 1 times weekly for 8 weeks to include the following interventions: Gait Training, Manual Therapy, Moist Heat/ Ice, Neuromuscular Re-ed, Patient Education, Self Care, Therapeutic Activities, and Therapeutic Exercise.        Gio Cade, PT

## 2024-07-18 ENCOUNTER — CLINICAL SUPPORT (OUTPATIENT)
Dept: REHABILITATION | Facility: HOSPITAL | Age: 74
End: 2024-07-18
Payer: MEDICARE

## 2024-07-18 DIAGNOSIS — M79.672 LEFT FOOT PAIN: Primary | ICD-10-CM

## 2024-07-18 PROCEDURE — 97112 NEUROMUSCULAR REEDUCATION: CPT | Mod: PO

## 2024-07-18 NOTE — PROGRESS NOTES
[facility]   Physical Therapy Treatment Note     Name: Gricelda Kevin [facility]  Clinic Number: [id number]    Therapy Diagnosis:   Encounter Diagnosis   Name Primary?    Left foot pain Yes     Physician: Javier Hammonds MD    Visit Date: 7/18/2024       Physician Orders: PT Eval and Treat   Medical Diagnosis from Referral:   M20.12,M21.612 (ICD-10-CM) - Hallux valgus with bunions of left foot   S93.129A (ICD-10-CM) - Closed dislocation of metatarsophalangeal (joint), initial encounter      Evaluation Date: 7/3/2024  Authorization Period Expiration: 7/2/2025  Plan of Care Expiration: 8/28/2024  Progress Note Due: 8/3/2024  Date of Surgery: 5/20/2024 bunionectomy with metatarsal osteotomy  Visit # / Visits authorized: 2/ 20  FOTO: 1/ 3     Precautions:   1 times per week for 6 to 8 weeks per fracture protocol.  PT to start week of 07/02/2024.   Heel weight-bearing only in boot week 1 AND week 2; Full WB in boot week 3 and 4.   Okay for active and active assist 1st MTP joint range of motion.  Active range of motion only at the 2nd MTP joint.  Stay in boot until next Ortho clinic visit.     PTA Visit #: 0/5     Time In: 1300  Time Out: 1400  Total Billable Time: 30 minutes 1:1 c PT    SUBJECTIVE     Pt reports: feeling good overall and is without new complaints today.  She was compliant with home exercise program.      Pain: 0/10  Location: left foot     OBJECTIVE     Proximal/distal screen:      Active Range of Motion(*= pain):   Ankle Left   DF (knee extended) 5 deg from neutral   Plantarflexion 55 deg   Inversion 40 deg   Eversion 15 deg      1st toe ext: 25 deg      Strength Assessment deferred due to heel only WB status. Will assess at later date. All grossly at least 3/5        Palpation: No TTP     Sensation: SILT     Flexibility: decreased in L ankle        Gait: pt heel weight bearing through the L with CAM boot donned with use of rolling walker for support        Intake Outcome Measure  for FOTO foot Survey     Therapist reviewed FOTO scores for Gricelda Hassan on 7/3/2024.   FOTO report - see Media section or FOTO account episode details.     Intake Score: 59% limit          Treatment     Gricelda received the treatments listed below:      therapeutic exercises to develop  for 5 minutes including:  Nustep for endurance x5 min    manual therapy techniques:  were applied to the:  for  5 minutes, including:  Ray mobilization x5 min    neuromuscular re-education activities to improve: Coordination, Kinesthetic, Sense, Proprioception, and Posture for 50 minutes. The following activities were included:  Ankle ABCs Ankle pumps  Seated heel raised x2 min  DF towel slide  Toe yoga  Banded PF/DF c RTB 3x10  Banded inv/ev c RTB 3x10  BKFO c rtb 3x10 x 2 sec hold  Banded bridges WB through heels 3x10 c RTB  Weight shifting in cam boot x5 min    therapeutic activities to improve functional performance for   minutes, including:      gait training to improve functional mobility and safety for   minutes, including:      Patient Education and Home Exercises     Home Exercises Provided and Patient Education Provided     Education provided:   - on WB status and POC    Written Home Exercises Provided: yes. Exercises were reviewed and Gricelda was able to demonstrate them prior to the end of the session.  Gricelda demonstrated good  understanding of the education provided. See EMR under Patient Instructions for exercises provided during therapy sessions    ASSESSMENT     Pt continues to tolerate treatment well at this time. Overall, Pt still with limited mobility with great toe extension and ray mobility, but strength and tolerance to exercise continues to progress well. Patient will continue to benefit from loading and progressions to tolerance going forward to improve overall function.    Gricelda Is progressing well towards her goals.   Pt prognosis is Good.     Pt will continue to benefit from skilled  outpatient physical therapy to address the deficits listed in the problem list box on initial evaluation, provide pt/family education and to maximize pt's level of independence in the home and community environment.     Pt's spiritual, cultural and educational needs considered and pt agreeable to plan of care and goals.     Anticipated barriers to physical therapy: none    Goals:   Short Term Goals: 4 weeks   Pt to report worst pain 3/10 to improve QOL  Pt to improve L ankle DF range of motion to neutral  Pt to improve FOTO by 10%  Pt to demo independence with initial HEP  Pt to tolerate strength assessment     Long Term Goals: 8 weeks   Pt to report worst pain 1/10 to improve QOL  Pt to improve L ankle DF range of motion to 5 deg past neutral  Pt to improve FOTO by 30%  Pt to demo independence with final HEP  Additional goals to come    PLAN     Plan of care Certification: 7/3/2024 to 8/28/2024.     Outpatient Physical Therapy 1 times weekly for 8 weeks to include the following interventions: Gait Training, Manual Therapy, Moist Heat/ Ice, Neuromuscular Re-ed, Patient Education, Self Care, Therapeutic Activities, and Therapeutic Exercise.        Gio Cade, PT

## 2024-07-24 ENCOUNTER — CLINICAL SUPPORT (OUTPATIENT)
Dept: REHABILITATION | Facility: HOSPITAL | Age: 74
End: 2024-07-24
Payer: MEDICARE

## 2024-07-24 DIAGNOSIS — M79.672 LEFT FOOT PAIN: Primary | ICD-10-CM

## 2024-07-24 PROCEDURE — 97112 NEUROMUSCULAR REEDUCATION: CPT | Mod: PO

## 2024-07-24 NOTE — PROGRESS NOTES
[facility]   Physical Therapy Treatment Note     Name: Gricelda Kevin [facility]  Clinic Number: [id number]    Therapy Diagnosis:   Encounter Diagnosis   Name Primary?    Left foot pain Yes     Physician: Javier Hammonds MD    Visit Date: 7/24/2024       Physician Orders: PT Eval and Treat   Medical Diagnosis from Referral:   M20.12,M21.612 (ICD-10-CM) - Hallux valgus with bunions of left foot   S93.129A (ICD-10-CM) - Closed dislocation of metatarsophalangeal (joint), initial encounter      Evaluation Date: 7/3/2024  Authorization Period Expiration: 7/2/2025  Plan of Care Expiration: 8/28/2024  Progress Note Due: 8/3/2024  Date of Surgery: 5/20/2024 bunionectomy with metatarsal osteotomy  Visit # / Visits authorized: 3/ 20  FOTO: 1/ 3     Precautions:   1 times per week for 6 to 8 weeks per fracture protocol.  PT to start week of 07/02/2024.   Heel weight-bearing only in boot week 1 AND week 2; Full WB in boot week 3 and 4.   Okay for active and active assist 1st MTP joint range of motion.  Active range of motion only at the 2nd MTP joint.  Stay in boot until next Ortho clinic visit.     PTA Visit #: 0/5     Time In: 1000  Time Out: 1055  Total Billable Time: 30 minutes 1:1 c PT    SUBJECTIVE     Pt reports: feeling good overall and is without new complaints today. She states she has a follow up with her MD next Thursday.  She was compliant with home exercise program.      Pain: 0/10  Location: left foot     OBJECTIVE     Proximal/distal screen:      Active Range of Motion(*= pain):   Ankle Left   DF (knee extended) 5 deg from neutral   Plantarflexion 55 deg   Inversion 40 deg   Eversion 15 deg      1st toe ext: 25 deg      Strength Assessment deferred due to heel only WB status. Will assess at later date. All grossly at least 3/5        Palpation: No TTP     Sensation: SILT     Flexibility: decreased in L ankle        Gait: pt heel weight bearing through the L with CAM boot donned with use of  rolling walker for support        Intake Outcome Measure for FOTO foot Survey     Therapist reviewed FOTO scores for Gricelda Hassan on 7/3/2024.   FOTO report - see Media section or FOTO account episode details.     Intake Score: 59% limit          Treatment     Gricelda received the treatments listed below:      therapeutic exercises to develop  for 5 minutes including:  Nustep for endurance x5 min    manual therapy techniques:  were applied to the:  for  0 minutes, including:  Ray mobilization x5 min    neuromuscular re-education activities to improve: Coordination, Kinesthetic, Sense, Proprioception, and Posture for 50 minutes. The following activities were included:  Ankle ABCs Ankle pumps  Seated heel raised x2 min  DF towel slide  Toe yoga  Banded PF/DF c RTB 3x10  Banded inv/ev c RTB 3x10  BKFO c rtb 3x10 x 2 sec hold  Banded bridges WB through heels 3x10 c RTB  Weight shifting in cam boot x5 min    therapeutic activities to improve functional performance for   minutes, including:      gait training to improve functional mobility and safety for   minutes, including:      Patient Education and Home Exercises     Home Exercises Provided and Patient Education Provided     Education provided:   - on WB status and POC    Written Home Exercises Provided: yes. Exercises were reviewed and Gricelda was able to demonstrate them prior to the end of the session.  Gricelda demonstrated good  understanding of the education provided. See EMR under Patient Instructions for exercises provided during therapy sessions    ASSESSMENT     Pt with great tolerance to treatment at this time but still limited with progressions by CAM boot. Overall, pt will benefit from transition to upright OOB activities next week to maximize improvement of strength and enhance overall function    Gricelda Is progressing well towards her goals.   Pt prognosis is Good.     Pt will continue to benefit from skilled outpatient physical therapy to  address the deficits listed in the problem list box on initial evaluation, provide pt/family education and to maximize pt's level of independence in the home and community environment.     Pt's spiritual, cultural and educational needs considered and pt agreeable to plan of care and goals.     Anticipated barriers to physical therapy: none    Goals:   Short Term Goals: 4 weeks   Pt to report worst pain 3/10 to improve QOL  Pt to improve L ankle DF range of motion to neutral  Pt to improve FOTO by 10%  Pt to demo independence with initial HEP  Pt to tolerate strength assessment     Long Term Goals: 8 weeks   Pt to report worst pain 1/10 to improve QOL  Pt to improve L ankle DF range of motion to 5 deg past neutral  Pt to improve FOTO by 30%  Pt to demo independence with final HEP  Additional goals to come    PLAN     Plan of care Certification: 7/3/2024 to 8/28/2024.     Outpatient Physical Therapy 1 times weekly for 8 weeks to include the following interventions: Gait Training, Manual Therapy, Moist Heat/ Ice, Neuromuscular Re-ed, Patient Education, Self Care, Therapeutic Activities, and Therapeutic Exercise.        Gio Cade, PT

## 2024-07-29 DIAGNOSIS — M20.12 HALLUX VALGUS WITH BUNIONS OF LEFT FOOT: Primary | ICD-10-CM

## 2024-07-29 DIAGNOSIS — M21.612 HALLUX VALGUS WITH BUNIONS OF LEFT FOOT: Primary | ICD-10-CM

## 2024-08-01 ENCOUNTER — CLINICAL SUPPORT (OUTPATIENT)
Dept: REHABILITATION | Facility: HOSPITAL | Age: 74
End: 2024-08-01
Payer: MEDICARE

## 2024-08-01 ENCOUNTER — HOSPITAL ENCOUNTER (OUTPATIENT)
Dept: RADIOLOGY | Facility: HOSPITAL | Age: 74
Discharge: HOME | End: 2024-08-01
Attending: ORTHOPAEDIC SURGERY
Payer: MEDICARE

## 2024-08-01 ENCOUNTER — OFFICE VISIT (OUTPATIENT)
Dept: ORTHOPEDICS | Facility: CLINIC | Age: 74
End: 2024-08-01
Payer: MEDICARE

## 2024-08-01 DIAGNOSIS — M20.12 HALLUX VALGUS WITH BUNIONS OF LEFT FOOT: Primary | ICD-10-CM

## 2024-08-01 DIAGNOSIS — M21.612 HALLUX VALGUS WITH BUNIONS OF LEFT FOOT: Primary | ICD-10-CM

## 2024-08-01 DIAGNOSIS — M25.672 DECREASED RANGE OF MOTION OF LEFT ANKLE: Primary | ICD-10-CM

## 2024-08-01 DIAGNOSIS — M20.12 HALLUX VALGUS WITH BUNIONS OF LEFT FOOT: ICD-10-CM

## 2024-08-01 DIAGNOSIS — R26.9 GAIT ABNORMALITY: ICD-10-CM

## 2024-08-01 DIAGNOSIS — M21.612 HALLUX VALGUS WITH BUNIONS OF LEFT FOOT: ICD-10-CM

## 2024-08-01 PROCEDURE — 99024 POSTOP FOLLOW-UP VISIT: CPT | Mod: POP,,, | Performed by: ORTHOPAEDIC SURGERY

## 2024-08-01 PROCEDURE — 99999 PR PBB SHADOW E&M-EST. PATIENT-LVL II: CPT | Mod: PBBFAC,,, | Performed by: ORTHOPAEDIC SURGERY

## 2024-08-01 PROCEDURE — 73630 X-RAY EXAM OF FOOT: CPT | Mod: 26,LT,, | Performed by: RADIOLOGY

## 2024-08-01 PROCEDURE — 73630 X-RAY EXAM OF FOOT: CPT | Mod: TC,PO,LT

## 2024-08-01 PROCEDURE — 97530 THERAPEUTIC ACTIVITIES: CPT | Mod: PO

## 2024-08-01 PROCEDURE — 97112 NEUROMUSCULAR REEDUCATION: CPT | Mod: PO

## 2024-08-01 PROCEDURE — 99212 OFFICE O/P EST SF 10 MIN: CPT | Mod: PBBFAC,25,PO | Performed by: ORTHOPAEDIC SURGERY

## 2024-08-01 NOTE — PROGRESS NOTES
[facility]   Physical Therapy Treatment Note     Name: Gricelda Kevin [facility]  Clinic Number: [id number]    Therapy Diagnosis:   Encounter Diagnoses   Name Primary?    Decreased range of motion of left ankle Yes    Gait abnormality      Physician: Javier Hammonds MD    Visit Date: 8/1/2024       Physician Orders: PT Eval and Treat   Medical Diagnosis from Referral:   M20.12,M21.612 (ICD-10-CM) - Hallux valgus with bunions of left foot   S93.129A (ICD-10-CM) - Closed dislocation of metatarsophalangeal (joint), initial encounter      Evaluation Date: 7/3/2024  Authorization Period Expiration: 7/2/2025  Plan of Care Expiration: 8/28/2024  Progress Note Due: 8/3/2024  Date of Surgery: 5/20/2024 bunionectomy with metatarsal osteotomy  Visit # / Visits authorized: 4/ 20  FOTO: 1/ 3     Precautions:   1 times per week for 6 to 8 weeks per fracture protocol.  PT to start week of 07/02/2024.   Heel weight-bearing only in boot week 1 AND week 2; Full WB in boot week 3 and 4.   Okay for active and active assist 1st MTP joint range of motion.  Active range of motion only at the 2nd MTP joint.  Stay in boot until next Ortho clinic visit.     PTA Visit #: 0/5     Time In: 1300  Time Out: 1355  Total Billable Time: 30 minutes 1:1 c PT    SUBJECTIVE     Pt reports: feeling good overall and is without new complaints today. She states she ordered an insert for her tennis shoes and will be able to wear a shoe when this comes in.  She was compliant with home exercise program.      Pain: 0/10  Location: left foot     OBJECTIVE     ** Patient ambulates in [place] shoe today    Proximal/distal screen:      Active Range of Motion(*= pain):   Ankle Left   DF (knee extended) 5 deg from neutral   Plantarflexion 55 deg   Inversion 40 deg   Eversion 15 deg      1st toe ext: 25 deg      Strength Assessment deferred due to heel only WB status. Will assess at later date. All grossly at least 3/5        Palpation: No TTP      Sensation: SILT     Flexibility: decreased in L ankle        Gait: pt heel weight bearing through the L with CAM boot donned with use of rolling walker for support        Intake Outcome Measure for FOTO foot Survey     Therapist reviewed FOTO scores for Gricelda Hassan on 7/3/2024.   FOTO report - see Media section or FOTO account episode details.     Intake Score: 59% limit          Treatment     Gricelda received the treatments listed below:      therapeutic exercises to develop  for 0 minutes including:  Nustep for endurance x5 min    manual therapy techniques:  were applied to the:  for  0 minutes, including:  Ray mobilization x5 min    neuromuscular re-education activities to improve: Coordination, Kinesthetic, Sense, Proprioception, and Posture for 40 minutes. The following activities were included:  Ankle ABCs Ankle pumps  Seated heel raised x2 min  DF towel slide  Toe yoga  Banded PF/DF c RTB 3x10  Banded inv/ev c RTB 3x10  BKFO c rtb 3x10 x 2 sec hold  Banded bridges WB through heels 3x10 c RTB    therapeutic activities to improve functional performance for  15 minutes, including:  Standing hip extension c RTB 3x10 BLE  Standing hip abduction c RTB 3x10 BLE  Shuttle squatting 75# 5x10    gait training to improve functional mobility and safety for   minutes, including:      Patient Education and Home Exercises     Home Exercises Provided and Patient Education Provided     Education provided:   - on WB status and POC    Written Home Exercises Provided: yes. Exercises were reviewed and Gricelda was able to demonstrate them prior to the end of the session.  Gricelda demonstrated good  understanding of the education provided. See EMR under Patient Instructions for exercises provided during therapy sessions    ASSESSMENT     Pt with great tolerance to treatment at this time, tolerating progression to upright standing and shuttle squatting activities. At this time, pt will benefit from transition to more  upright strengthening to maximize return of functional activities. Patient will continue to benefit from loading and progressions to tolerance going forward to improve overall function.    Gricelda Is progressing well towards her goals.   Pt prognosis is Good.     Pt will continue to benefit from skilled outpatient physical therapy to address the deficits listed in the problem list box on initial evaluation, provide pt/family education and to maximize pt's level of independence in the home and community environment.     Pt's spiritual, cultural and educational needs considered and pt agreeable to plan of care and goals.     Anticipated barriers to physical therapy: none    Goals:   Short Term Goals: 4 weeks   Pt to report worst pain 3/10 to improve QOL  Pt to improve L ankle DF range of motion to neutral  Pt to improve FOTO by 10%  Pt to demo independence with initial HEP  Pt to tolerate strength assessment     Long Term Goals: 8 weeks   Pt to report worst pain 1/10 to improve QOL  Pt to improve L ankle DF range of motion to 5 deg past neutral  Pt to improve FOTO by 30%  Pt to demo independence with final HEP  Additional goals to come    PLAN     Plan of care Certification: 7/3/2024 to 8/28/2024.     Outpatient Physical Therapy 1 times weekly for 8 weeks to include the following interventions: Gait Training, Manual Therapy, Moist Heat/ Ice, Neuromuscular Re-ed, Patient Education, Self Care, Therapeutic Activities, and Therapeutic Exercise.        Gio Cade, PT

## 2024-08-01 NOTE — PROGRESS NOTES
Status/Diagnosis: Left hallux valgus with crossover toe deformity; 2nd MTP dorsal dislocation; 2nd metatarsalgia; rigid 2nd hammertoe  Date of Surgery: 02/01/2023; 05/20/2024  Date of Injury: none  Return visit: 6 weeks  X-rays on Return: WB 3-views Left foot    Present History:  Gricelda Hassan is a 74 y.o. female who returns today for routine postoperative visit.  Continues to do well.  0/10 pain.  Only complaint today is residual hypersensitivity over the surgical scar dorsal medially.  Currently weight-bearing as tolerated in her short boot.    At baseline patient exercises regularly.  Walk 3-4 miles per day.  Bikes 10+ miles.      Physical exam:  There were no vitals filed for this visit.  There is no height or weight on file to calculate BMI.  General: In no apparent distress; well developed and well nourished.  HEENT: normocephalic; atraumatic.  Cardiovascular: regular rate.  Respiratory: no increased work of breathing.  Musculoskeletal:   Gait:  Did not assess  Inspection:  Overall alignment well-maintained status post hallux valgus correction and relocation of 2nd toe at the level of the MTP joint.    Little to no residual swelling.  No residual laxity of the 2nd MTP joint.  Still some mild prominence involving the dorsal medial aspect of the 1st metatarsal head however I reiterated to patient that resecting this would cause significant bone loss.    No tenderness at the distal 1st metatarsal osteotomy site on deep palpation.  1st MTP range of motion from 10° dorsiflexion to 20° plantar flexion.  Second MTP range of motion from neutral to 10° plantar flexion.  Still with some degree of altered sensation along the dorsal medial midfoot but improved.  Otherwise gross motor and sensory function intact.  Palpable pedal pulse.     Imaging Studies/Outside documentation:  I have ordered/reviewed/interpreted the following images/outside documentation:  WB 3-views Left foot:  As previously noted, patient is  status post hallux valgus correction with 1st TMT joint fusion and proximal phalangeal osteotomy.    Now patient status post distal 1st metatarsal osteotomy with lateral translation and screw fixation.  Overall alignment well-maintained.  No evidence of implant loosening or failure.  Early signs of bony bridging at the 1st metatarsal osteotomy site.  Also status post open relocation of the 2nd MTP joint.  No evidence of residual subluxation however there is progressive 2nd MTP joint space narrowing present.       Assessment:  Gricelda Hassan is a 74 y.o. female with Left hallux valgus with crossover toe deformity; 2nd MTP dorsal dislocation; 2nd metatarsalgia; rigid 2nd hammertoe.     Plan:   Clinical and radiographic findings were discussed.  Patient okay to transition to either a rigid soled postoperative shoe or normal shoe wear with a carbon fiber inserts in place.  Handout provided today.    Patient is still to refrain from any high impact activities-running, jumping, etc..  Okay to bathe/submerge the surgical extremity.  Patient did mention worsening 2nd over 3rd crossover toe deformity more attributable to the 3rd toe than the 2nd.  We will continue to monitor this going forward.    Return to clinic in 6 weeks for repeat evaluation and x-ray.  Patient voiced understanding.  All questions were answered        This note was created using voice recognition software and may contain grammatical errors.

## 2024-08-14 ENCOUNTER — CLINICAL SUPPORT (OUTPATIENT)
Dept: REHABILITATION | Facility: HOSPITAL | Age: 74
End: 2024-08-14
Payer: MEDICARE

## 2024-08-14 DIAGNOSIS — M79.672 LEFT FOOT PAIN: Primary | ICD-10-CM

## 2024-08-14 PROCEDURE — 97530 THERAPEUTIC ACTIVITIES: CPT | Mod: PO

## 2024-08-14 PROCEDURE — 97112 NEUROMUSCULAR REEDUCATION: CPT | Mod: PO

## 2024-08-14 NOTE — PROGRESS NOTES
[facility]   Physical Therapy Discharge Note     [facility]  Clinic Number: [id number]    Therapy Diagnosis:   Encounter Diagnosis   Name Primary?    Left foot pain Yes     Physician: Javier Hammonds MD    Visit Date: 8/14/2024       Physician Orders: PT Eval and Treat   Medical Diagnosis from Referral:   M20.12,M21.612 (ICD-10-CM) - Hallux valgus with bunions of left foot   S93.129A (ICD-10-CM) - Closed dislocation of metatarsophalangeal (joint), initial encounter      Evaluation Date: 7/3/2024  Authorization Period Expiration: 7/2/2025  Plan of Care Expiration: 8/28/2024  Progress Note Due: 8/3/2024  Date of Surgery: 5/20/2024 bunionectomy with metatarsal osteotomy  Visit # / Visits authorized: 5/ 20  FOTO: 1/ 3     Precautions:   1 times per week for 6 to 8 weeks per fracture protocol.  PT to start week of 07/02/2024.   Heel weight-bearing only in boot week 1 AND week 2; Full WB in boot week 3 and 4.   Okay for active and active assist 1st MTP joint range of motion.  Active range of motion only at the 2nd MTP joint.  Stay in boot until next Ortho clinic visit.     PTA Visit #: 0/5     Time In: 1100  Time Out: 1155  Total Billable Time: 55 minutes 1:1 c PT    SUBJECTIVE     Pt reports: feeling good overall and is without new complaints today. She states she is ready for discharge.  She was compliant with home exercise program.      Pain: 0/10  Location: left foot     OBJECTIVE     Patient ambulates in tennis shoe today with carbon plate    Proximal/distal screen:      Active Range of Motion(*= pain):   Ankle Left   DF (knee extended) neutral   Plantarflexion 70 deg   Inversion 43 deg   Eversion 30 deg           Strength Assessment: dawna left lower extremity groups 5/5        Palpation: No TTP     Sensation: SILT     Flexibility: decreased in L ankle        Gait: pt heel weight bearing through the L with CAM boot donned with use of rolling walker for support        Intake  "Outcome Measure for FOTO foot Survey     Therapist reviewed FOTO scores for Gricelda Hassan on 7/3/2024.   FOTO report - see Media section or FOTO account episode details.     Intake Score: 23% limit          Treatment     Gricelda received the treatments listed below:      therapeutic exercises to develop  for 0 minutes including:  Nustep for endurance x5 min    manual therapy techniques:  were applied to the:  for  0 minutes, including:  Ray mobilization x5 min    neuromuscular re-education activities to improve: Coordination, Kinesthetic, Sense, Proprioception, and Posture for 40 minutes. The following activities were included:  Ankle ABCs Ankle pumps  Seated heel raised x2 min  DF towel slide  Toe yoga  Banded PF/DF c RTB 3x10  Banded inv/ev c RTB 3x10  BKFO c rtb 3x10 x 2 sec hold  Banded bridges WB through heels 3x10 c RTB  Tandem balance 2x30" on airex EO and EC    therapeutic activities to improve functional performance for  15 minutes, including:  Standing hip extension c RTB 3x10 BLE  Standing hip abduction c RTB 3x10 BLE  Shuttle squatting 75# 5x10    gait training to improve functional mobility and safety for   minutes, including:      Patient Education and Home Exercises     Home Exercises Provided and Patient Education Provided     Education provided:   - on WB status and POC    Written Home Exercises Provided: yes. Exercises were reviewed and Gricelda was able to demonstrate them prior to the end of the session.  Gricelda demonstrated good  understanding of the education provided. See EMR under Patient Instructions for exercises provided during therapy sessions    ASSESSMENT     Re-assessment performed today. Pt noted to have meet all established goals at this time and ins independent with maintenance of progress at home with HEP. Due to this, pt was educated on final discharge plan and demonstrated good overall understanding. Pt will discharge at this time and will maintain progress independently " with HEP.    Gricelda Is progressing well towards her goals.   Pt prognosis is Good.       Pt's spiritual, cultural and educational needs considered and pt agreeable to plan of care and goals.     Anticipated barriers to physical therapy: none    Goals: MET  Short Term Goals: 4 weeks   Pt to report worst pain 3/10 to improve QOL  Pt to improve L ankle DF range of motion to neutral  Pt to improve FOTO by 10%  Pt to demo independence with initial HEP  Pt to tolerate strength assessment     Long Term Goals: 8 weeks   Pt to report worst pain 1/10 to improve QOL  Pt to improve L ankle DF range of motion to 5 deg past neutral  Pt to improve FOTO by 30%  Pt to demo independence with final HEP  Additional goals to come    PLAN     Discharge today       Gio Cade, PT

## 2024-08-27 ENCOUNTER — OFFICE VISIT (OUTPATIENT)
Dept: ORTHOPEDICS | Facility: CLINIC | Age: 74
End: 2024-08-27
Payer: MEDICARE

## 2024-08-27 DIAGNOSIS — M65.342 TRIGGER RING FINGER OF LEFT HAND: Primary | ICD-10-CM

## 2024-08-27 PROCEDURE — 99999PBSHW PR PBB SHADOW TECHNICAL ONLY FILED TO HB: Mod: PBBFAC,,,

## 2024-08-27 PROCEDURE — 99213 OFFICE O/P EST LOW 20 MIN: CPT | Mod: PBBFAC,PO | Performed by: PHYSICIAN ASSISTANT

## 2024-08-27 PROCEDURE — 99213 OFFICE O/P EST LOW 20 MIN: CPT | Mod: S$PBB,25,, | Performed by: PHYSICIAN ASSISTANT

## 2024-08-27 PROCEDURE — 99999 PR PBB SHADOW E&M-EST. PATIENT-LVL III: CPT | Mod: PBBFAC,,, | Performed by: PHYSICIAN ASSISTANT

## 2024-08-27 PROCEDURE — 20550 NJX 1 TENDON SHEATH/LIGAMENT: CPT | Mod: S$PBB,LT,, | Performed by: PHYSICIAN ASSISTANT

## 2024-08-27 PROCEDURE — 20550 NJX 1 TENDON SHEATH/LIGAMENT: CPT | Mod: PBBFAC,PO,LT | Performed by: PHYSICIAN ASSISTANT

## 2024-08-27 RX ADMIN — TRIAMCINOLONE ACETONIDE 40 MG: 40 INJECTION, SUSPENSION INTRA-ARTICULAR; INTRAMUSCULAR at 03:08

## 2024-08-27 NOTE — PROGRESS NOTES
8/27/2024    HPI:  Gricelda Hassan is a 74 y.o. female, who presents to clinic today for continued evaluation of her left ring finger trigger finger.  States the steroid injection she received for the right ring finger trigger finger and left ring finger trigger finger completely resolved her symptoms until recently for the left ring finger.  States the right ring finger continues to have no trigger finger symptoms.  Denies any acute injuries.  Denies any other complaints this time.    PMHX:  Past Medical History:   Diagnosis Date    Atrial tachycardia     Hyperlipidemia     Hypertension     Ischemic colitis        PSHX:  Past Surgical History:   Procedure Laterality Date    CORRECTION OF HAMMER TOE Left 2/1/2023    Procedure: CORRECTION, HAMMER TOE;  Surgeon: Javier Hammonds MD;  Location: [facility] OR;  Service: Orthopedics;  Laterality: Left;  2nd    FOOT HARDWARE REMOVAL Left 5/20/2024    Procedure: REMOVAL, HARDWARE, FOOT;  Surgeon: Javier Hammonds MD;  Location: [facility] OR;  Service: Orthopedics;  Laterality: Left;    LAPIDUS BUNIONECTOMY Left 2/1/2023    Procedure: BUNIONECTOMY, LAPIDUS;  Surgeon: Javier Hammonds MD;  Location: [facility] OR;  Service: Orthopedics;  Laterality: Left;    OPEN TREATMENT, DISLOCATION, MTP JOINT Left 2/1/2023    Procedure: OPEN TREATMENT, DISLOCATION, MTP JOINT;  Surgeon: Javier Hammonds MD;  Location: [facility] OR;  Service: Orthopedics;  Laterality: Left;  2nd    OPEN TREATMENT, DISLOCATION, MTP JOINT Left 5/20/2024    Procedure: OPEN TREATMENT, DISLOCATION, MTP JOINT;  Surgeon: Javier Hammonds MD;  Location: [facility] OR;  Service: Orthopedics;  Laterality: Left;  2nd(second)MTP Joint    SURGICAL REMOVAL OF BUNION WITH OSTEOTOMY OF METATARSAL BONE Left 5/20/2024    Procedure: BUNIONECTOMY, WITH METATARSAL OSTEOTOMY;  Surgeon: Javier Hammonds MD;  Location: [facility] OR;  Service: Orthopedics;  Laterality: Left;    SURGICAL REMOVAL OF METATARSAL HEAD Left 2/1/2023    Procedure:  OSTECTOMY, METATARSAL BONE, HEAD;  Surgeon: Javier Hammonds MD;  Location: [facility] OR;  Service: Orthopedics;  Laterality: Left;    TUBAL LIGATION         FMHX:  Family History   Problem Relation Name Age of Onset    COPD Mother      Heart disease Father          CABG age 60    Hypertension Brother      Neuropathy Brother      Hypertension Sister Ischemic colitis     No Known Problems Daughter Ellen     No Known Problems Daughter Charline        SOCHX:  Social History     Tobacco Use    Smoking status: Former     Current packs/day: 0.00     Average packs/day: 0.5 packs/day for 16.0 years (8.0 ttl pk-yrs)     Types: Cigarettes     Start date: 1969     Quit date: 1985     Years since quittin.6    Smokeless tobacco: Never   Substance Use Topics    Alcohol use: Yes     Alcohol/week: 1.0 standard drink of alcohol     Types: 1 Glasses of wine per week     Comment: occ       ALLERGIES:  Adhesive tape-silicones and Penicillins    CURRENT MEDICATIONS:  Current Outpatient Medications on File Prior to Visit   Medication Sig Dispense Refill    aspirin (ECOTRIN) 81 MG EC tablet Take 81 mg by mouth once daily.      atorvastatin (LIPITOR) 40 MG tablet Take 1 tablet by mouth every evening.      calcium carbonate (OS-PAULINE) 500 mg calcium (1,250 mg) tablet Take 1 tablet by mouth once daily.      cyanocobalamin 500 MCG tablet Vitamin B-12 500 mcg tablet   1 tablet; 500 MCG; Once a day      gabapentin (NEURONTIN) 300 MG capsule Take 300 mg by mouth every evening.      melatonin 3 mg Cap Take 1 capsule as needed by oral route.      olmesartan (BENICAR) 20 MG tablet Take 1 tablet by mouth once daily.      vitamin D (VITAMIN D3) 1000 units Tab Take 1,000 Units by mouth once daily.       Current Facility-Administered Medications on File Prior to Visit   Medication Dose Route Frequency Provider Last Rate Last Admin    diphenhydrAMINE injection 25 mg  25 mg Intravenous Q6H PRN Saroj Best MD        fentaNYL 50 mcg/mL injection  25 mcg  25 mcg Intravenous Q5 Min PRN Saroj Best MD        HYDROmorphone (PF) injection 0.2 mg  0.2 mg Intravenous Q5 Min PRN Saroj Best MD        lactated ringers infusion   Intravenous Continuous Saroj Best MD        LIDOcaine (PF) 10 mg/ml (1%) injection 10 mg  1 mL Intradermal Once Saroj Best MD        midazolam injection 0.5 mg  0.5 mg Intravenous PRN Sraoj Best MD        oxyCODONE immediate release tablet 5 mg  5 mg Oral Q3H PRN Saroj Best MD        prochlorperazine injection Soln 5 mg  5 mg Intravenous Q4H PRN Saroj Best MD        sodium chloride 0.9% bolus 1,000 mL  1,000 mL Intravenous Once Idalia Aguilera NP        sodium chloride 0.9% flush 10 mL  10 mL Intravenous Once Saroj Best MD           REVIEW OF SYSTEMS:  Review of Systems Complete; Negative, unless noted above.    GENERAL PHYSICAL EXAM:   LMP  (LMP Unknown)    GEN: well developed, well nourished, no acute distress   PULM: No wheezing, no respiratory distress   CV: RRR    ORTHO EXAM:   Examination of the left hand reveals no edema, erythema, ecchymosis, or skin breakdown.  Able make composite fist and fully extend all fingers.  Active triggering noted of the left ring finger.  Tenderness to palpation overlying the area of the A1 pulley of the left ring finger.  No significant tenderness or active triggering noted throughout the remainder of the left hand.  Normal sensation in the radial, ulnar, median nerve distributions.  Capillary refill less than 2 seconds.    RADIOLOGY:   None.    ASSESSMENT:   Left ring finger trigger finger    PLAN:  1. I discussed with Gricelda Hassan that considering the previous steroid injection provided a significant amount of relief for a long period of time, that the best course of action this time is to perform a repeat steroid injection into the flexor tendon sheath at the level of the A1 pulley of the left ring finger in clinic today.  She  verbally agreed with the treatment plan    2.  Informed consent was obtained.  After an alcohol prep followed by a chlorhexidine prep, a left ring finger trigger finger steroid injection was performed.  She tolerated the procedure well with no immediate complications.    3. I would like her follow up in clinic on a p.r.n. basis for any worsening of her symptoms or for any hand, wrist, elbow problems or concerns.  She was instructed to contact the clinic for any problems or concerns in the interim.

## 2024-08-28 RX ORDER — TRIAMCINOLONE ACETONIDE 40 MG/ML
40 INJECTION, SUSPENSION INTRA-ARTICULAR; INTRAMUSCULAR
Status: DISCONTINUED | OUTPATIENT
Start: 2024-08-27 | End: 2024-08-28 | Stop reason: HOSPADM

## 2024-08-28 NOTE — PROCEDURES
Tendon Sheath    Date/Time: 8/27/2024 3:00 PM    Performed by: Haris Adler PA-C  Authorized by: Haris Adler PA-C    Consent Done?:  Yes (Verbal)  Indications:  Pain  Site marked: the procedure site was marked    Timeout: prior to procedure the correct patient, procedure, and site was verified    Prep: patient was prepped and draped in usual sterile fashion      Local anesthesia used?: Yes    Local anesthetic:  Lidocaine 1% without epinephrine  Anesthetic total (ml):  0.5    Location:  Ring finger  Site:  L ring flexor tendon sheath  Ultrasonic guidance for needle placement?: No    Needle size:  25 G  Approach:  Volar  Medications:  40 mg triamcinolone acetonide 40 mg/mL (20 mg injected)  Patient tolerance:  Patient tolerated the procedure well with no immediate complications

## 2024-09-13 DIAGNOSIS — M21.612 HALLUX VALGUS WITH BUNIONS OF LEFT FOOT: Primary | ICD-10-CM

## 2024-09-13 DIAGNOSIS — M20.12 HALLUX VALGUS WITH BUNIONS OF LEFT FOOT: Primary | ICD-10-CM

## 2024-09-17 ENCOUNTER — HOSPITAL ENCOUNTER (OUTPATIENT)
Dept: RADIOLOGY | Facility: HOSPITAL | Age: 74
Discharge: HOME | End: 2024-09-17
Attending: ORTHOPAEDIC SURGERY
Payer: MEDICARE

## 2024-09-17 ENCOUNTER — OFFICE VISIT (OUTPATIENT)
Dept: ORTHOPEDICS | Facility: CLINIC | Age: 74
End: 2024-09-17
Payer: MEDICARE

## 2024-09-17 DIAGNOSIS — M20.12 HALLUX VALGUS WITH BUNIONS OF LEFT FOOT: ICD-10-CM

## 2024-09-17 DIAGNOSIS — M21.612 HALLUX VALGUS WITH BUNIONS OF LEFT FOOT: Primary | ICD-10-CM

## 2024-09-17 DIAGNOSIS — M21.612 HALLUX VALGUS WITH BUNIONS OF LEFT FOOT: ICD-10-CM

## 2024-09-17 DIAGNOSIS — M20.12 HALLUX VALGUS WITH BUNIONS OF LEFT FOOT: Primary | ICD-10-CM

## 2024-09-17 PROCEDURE — 99999 PR PBB SHADOW E&M-EST. PATIENT-LVL II: CPT | Mod: PBBFAC,,, | Performed by: ORTHOPAEDIC SURGERY

## 2024-09-17 PROCEDURE — 73630 X-RAY EXAM OF FOOT: CPT | Mod: 26,LT,, | Performed by: RADIOLOGY

## 2024-09-17 PROCEDURE — 73630 X-RAY EXAM OF FOOT: CPT | Mod: TC,PO,LT

## 2024-09-17 PROCEDURE — 99213 OFFICE O/P EST LOW 20 MIN: CPT | Mod: S$PBB,,, | Performed by: ORTHOPAEDIC SURGERY

## 2024-09-17 PROCEDURE — 99212 OFFICE O/P EST SF 10 MIN: CPT | Mod: PBBFAC,25,PO | Performed by: ORTHOPAEDIC SURGERY

## 2024-09-17 NOTE — PROGRESS NOTES
Status/Diagnosis: Left hallux valgus with crossover toe deformity; 2nd MTP dorsal dislocation; 2nd metatarsalgia; rigid 2nd hammertoe  Date of Surgery: 02/01/2023; 05/20/2024  Date of Injury: none  Return visit: PRN  X-rays on Return: pending patient complaint    Present History:  Gricelda Hassan is a 74 y.o. female who returns today for routine postoperative visit.  Continues to do well.  0/10.  Inquiring about returning to normal shoe wear.  Mentioned upcoming trip to [place] in the next several weeks.    At baseline patient exercises regularly.  Walk 3-4 miles per day.  Bikes 10+ miles.      Physical exam:  There were no vitals filed for this visit.  There is no height or weight on file to calculate BMI.  General: In no apparent distress; well developed and well nourished.  HEENT: normocephalic; atraumatic.  Cardiovascular: regular rate.  Respiratory: no increased work of breathing.  Musculoskeletal:   Gait:  Did not assess  Inspection:  Overall alignment well-maintained status post hallux valgus correction and relocation of 2nd toe at the level of the MTP joint.    Little to no residual swelling.  No residual laxity of the 2nd MTP joint.  Still some mild prominence involving the dorsal medial aspect of the 1st metatarsal head however I reiterated to patient that resecting this would cause significant bone loss.    Subtle prominence of DM HW.  No tenderness at the distal 1st metatarsal osteotomy site on deep palpation.  1st MTP range of motion from 10° dorsiflexion to 20° plantar flexion.  Second MTP range of motion from neutral to 10° plantar flexion.  Still with some degree of altered sensation along the dorsal medial midfoot but improved.  Otherwise gross motor and sensory function intact.  Palpable pedal pulse.     Imaging Studies/Outside documentation:  I have ordered/reviewed/interpreted the following images/outside documentation:  WB 3-views Left foot:  As previously noted, patient is status post hallux  valgus correction with 1st TMT joint fusion and proximal phalangeal osteotomy.    Now patient status post distal 1st metatarsal osteotomy with lateral translation and screw fixation.  Overall alignment well-maintained.  No evidence of implant loosening or failure.  Continued signs of bony bridging at the 1st metatarsal osteotomy site.  Also status post open relocation of the 2nd MTP joint.  No evidence of residual subluxation however there is progressive 2nd MTP joint space narrowing present.       Assessment:  Gricelda Hassan is a 74 y.o. female with Left hallux valgus with crossover toe deformity; 2nd MTP dorsal dislocation; 2nd metatarsalgia; rigid 2nd hammertoe.     Plan:   Clinical and radiographic findings were discussed.  She continues to do well postoperatively.  Okay for rigid soled shoe wear, specifically discussed her strapped sandals with arch support.    Patient voiced understanding.  All questions were answered.  She will up on an as-needed basis.      This note was created using voice recognition software and may contain grammatical errors.

## 2024-09-20 ENCOUNTER — PATIENT MESSAGE (OUTPATIENT)
Dept: OBSTETRICS AND GYNECOLOGY | Facility: CLINIC | Age: 74
End: 2024-09-20
Payer: MEDICARE

## 2024-11-11 ENCOUNTER — PATIENT MESSAGE (OUTPATIENT)
Dept: ORTHOPEDICS | Facility: CLINIC | Age: 74
End: 2024-11-11
Payer: MEDICARE

## 2024-11-11 DIAGNOSIS — M21.612 HALLUX VALGUS WITH BUNIONS OF LEFT FOOT: Primary | ICD-10-CM

## 2024-11-11 DIAGNOSIS — M20.12 HALLUX VALGUS WITH BUNIONS OF LEFT FOOT: Primary | ICD-10-CM

## 2024-11-12 ENCOUNTER — OFFICE VISIT (OUTPATIENT)
Dept: ORTHOPEDICS | Facility: CLINIC | Age: 74
End: 2024-11-12
Payer: MEDICARE

## 2024-11-12 ENCOUNTER — HOSPITAL ENCOUNTER (OUTPATIENT)
Dept: RADIOLOGY | Facility: HOSPITAL | Age: 74
Discharge: HOME | End: 2024-11-12
Attending: ORTHOPAEDIC SURGERY
Payer: MEDICARE

## 2024-11-12 VITALS
HEART RATE: 72 BPM | DIASTOLIC BLOOD PRESSURE: 60 MMHG | HEIGHT: 63 IN | SYSTOLIC BLOOD PRESSURE: 111 MMHG | BODY MASS INDEX: 21.76 KG/M2 | WEIGHT: 122.81 LBS

## 2024-11-12 DIAGNOSIS — M21.612 HALLUX VALGUS WITH BUNIONS OF LEFT FOOT: Primary | ICD-10-CM

## 2024-11-12 DIAGNOSIS — M20.12 HALLUX VALGUS WITH BUNIONS OF LEFT FOOT: ICD-10-CM

## 2024-11-12 DIAGNOSIS — M20.12 HALLUX VALGUS WITH BUNIONS OF LEFT FOOT: Primary | ICD-10-CM

## 2024-11-12 DIAGNOSIS — M21.612 HALLUX VALGUS WITH BUNIONS OF LEFT FOOT: ICD-10-CM

## 2024-11-12 PROCEDURE — 99213 OFFICE O/P EST LOW 20 MIN: CPT | Mod: PBBFAC,25,PO | Performed by: ORTHOPAEDIC SURGERY

## 2024-11-12 PROCEDURE — 99999 PR PBB SHADOW E&M-EST. PATIENT-LVL III: CPT | Mod: PBBFAC,,, | Performed by: ORTHOPAEDIC SURGERY

## 2024-11-12 PROCEDURE — 73630 X-RAY EXAM OF FOOT: CPT | Mod: 26,LT,, | Performed by: RADIOLOGY

## 2024-11-12 PROCEDURE — 99213 OFFICE O/P EST LOW 20 MIN: CPT | Mod: S$PBB,,, | Performed by: ORTHOPAEDIC SURGERY

## 2024-11-12 PROCEDURE — 73630 X-RAY EXAM OF FOOT: CPT | Mod: TC,PO,LT

## 2024-11-12 NOTE — PROGRESS NOTES
Status/Diagnosis: Left hallux valgus with crossover toe deformity; 2nd MTP dorsal dislocation; 2nd metatarsalgia; rigid 2nd hammertoe  Date of Surgery: 02/01/2023; 05/20/2024  Date of Injury: none  Return visit: PRN  X-rays on Return: pending patient complaint    Present History:  Gricelda Hassan is a 74 y.o. female who returns today with complaints of recurrent left forefoot pain.  Patient has been extremely active over the last several weeks.  Was walking barefoot while the beach house.  She did self treat thereafter with ice, elevation, and re-initiation of postoperative shoe wear.  This has provided significant relief.    At baseline patient exercises regularly.  Walk 3-4 miles per day.  Bikes 10+ miles.      Physical exam:  Vitals:    11/12/24 0915   BP: 111/60   Pulse: 72     Body mass index is 21.75 kg/m².  General: In no apparent distress; well developed and well nourished.  HEENT: normocephalic; atraumatic.  Cardiovascular: regular rate.  Respiratory: no increased work of breathing.  Musculoskeletal:   Gait:  Did not assess  Inspection:  Overall alignment well-maintained status post hallux valgus correction and relocation of 2nd toe at the level of the MTP joint.    Little to no residual swelling.  No residual laxity of the 2nd MTP joint.  Still some mild prominence involving the dorsal medial aspect of the 1st metatarsal head however I reiterated to patient that resecting this would cause significant bone loss.    Subtle prominence of DM HW.  No tenderness at the distal 1st metatarsal osteotomy site on deep palpation.  1st MTP range of motion from 10° dorsiflexion to 20° plantar flexion.  Second MTP range of motion from neutral to 10° plantar flexion.  Still with some degree of altered sensation along the dorsal medial midfoot but improved.  Otherwise gross motor and sensory function intact.  Palpable pedal pulse.     Imaging Studies/Outside documentation:  I have ordered/reviewed/interpreted the  following images/outside documentation:  WB 3-views Left foot:  As previously noted, patient is status post hallux valgus correction with 1st TMT joint fusion and proximal phalangeal osteotomy.    Now patient status post distal 1st metatarsal osteotomy with lateral translation and screw fixation.  Overall alignment well-maintained.  No evidence of implant loosening or failure.  Continued signs of bony bridging at the 1st metatarsal osteotomy site.  Also status post open relocation of the 2nd MTP joint.  No evidence of residual subluxation however there is progressive 2nd MTP joint space narrowing present.       Assessment:  Gricelda Hassan is a 74 y.o. female with Left hallux valgus with crossover toe deformity; 2nd MTP dorsal dislocation; 2nd metatarsalgia; rigid 2nd hammertoe.     Plan:   Clinical and radiographic findings were discussed.    As noted in HPI, patient with somewhat new onset pain related to increased activity while vacation.  No evidence of hardware loosening or failure on repeat x-ray today.    Patient overall doing much better with symptom management and re-initiation of postoperative shoe wear.    Discussed this will likely to be her baseline going forward.   Symptom management as outlined above.    Patient voiced understanding.  All questions were answered.  She will follow up an as-needed basis.      This note was created using voice recognition software and may contain grammatical errors.

## 2024-12-04 ENCOUNTER — OFFICE VISIT (OUTPATIENT)
Dept: URGENT CARE | Facility: CLINIC | Age: 74
End: 2024-12-04
Payer: MEDICARE

## 2024-12-04 VITALS
OXYGEN SATURATION: 96 % | TEMPERATURE: 98 F | WEIGHT: 122 LBS | RESPIRATION RATE: 18 BRPM | HEART RATE: 69 BPM | HEIGHT: 63 IN | DIASTOLIC BLOOD PRESSURE: 90 MMHG | SYSTOLIC BLOOD PRESSURE: 162 MMHG | BODY MASS INDEX: 21.62 KG/M2

## 2024-12-04 DIAGNOSIS — J01.90 ACUTE RHINOSINUSITIS: ICD-10-CM

## 2024-12-04 DIAGNOSIS — J02.9 SORE THROAT: ICD-10-CM

## 2024-12-04 DIAGNOSIS — H10.9 CONJUNCTIVITIS OF RIGHT EYE, UNSPECIFIED CONJUNCTIVITIS TYPE: Primary | ICD-10-CM

## 2024-12-04 LAB
CTP QC/QA: YES
MOLECULAR STREP A: NEGATIVE

## 2024-12-04 PROCEDURE — 99203 OFFICE O/P NEW LOW 30 MIN: CPT | Mod: S$GLB,,, | Performed by: INTERNAL MEDICINE

## 2024-12-04 PROCEDURE — 87651 STREP A DNA AMP PROBE: CPT | Mod: QW,S$GLB,, | Performed by: INTERNAL MEDICINE

## 2024-12-04 RX ORDER — BENZONATATE 100 MG/1
100 CAPSULE ORAL 3 TIMES DAILY PRN
Qty: 20 CAPSULE | Refills: 0 | Status: SHIPPED | OUTPATIENT
Start: 2024-12-04 | End: 2024-12-14

## 2024-12-04 RX ORDER — TOBRAMYCIN 3 MG/ML
1 SOLUTION/ DROPS OPHTHALMIC EVERY 6 HOURS
Qty: 5 ML | Refills: 0 | Status: SHIPPED | OUTPATIENT
Start: 2024-12-04 | End: 2024-12-09

## 2024-12-04 NOTE — PROGRESS NOTES
"Subjective:      Patient ID: Gricelda Hassan is a 74 y.o. female.    Vitals:  height is 5' 3" (1.6 m) and weight is 55.3 kg (122 lb). Her oral temperature is 98.4 °F (36.9 °C). Her blood pressure is 162/90 (abnormal) and her pulse is 69. Her respiration is 18 and oxygen saturation is 96%.     Chief Complaint: Cough    Pt presents with c/o cough, sore throat, discharge/ itchiness from right eye Onset-Sunday. Pt states has taken dayquil and Nyquil, and OTC eye drops, no relief. Pain score 8/10  Exposed to strep    Cough  This is a new problem. The current episode started in the past 7 days. The problem has been unchanged. The problem occurs constantly. The cough is Non-productive. Associated symptoms include a sore throat. Pertinent negatives include no chest pain, chills, ear congestion, ear pain, fever, headaches, heartburn, hemoptysis, myalgias, nasal congestion, postnasal drip, rash, rhinorrhea, shortness of breath, sweats, weight loss or wheezing. Nothing aggravates the symptoms. She has tried OTC cough suppressant for the symptoms. The treatment provided no relief. Her past medical history is significant for bronchitis. There is no history of asthma, bronchiectasis, COPD, emphysema, environmental allergies or pneumonia.       Constitution: Negative for chills and fever.   HENT:  Positive for sore throat. Negative for ear pain and postnasal drip.    Cardiovascular:  Negative for chest pain.   Respiratory:  Positive for cough. Negative for bloody sputum, shortness of breath and wheezing.    Gastrointestinal:  Negative for heartburn.   Musculoskeletal:  Negative for muscle ache.   Skin:  Negative for rash.   Allergic/Immunologic: Negative for environmental allergies.   Neurological:  Negative for headaches.      Objective:     Physical Exam   Constitutional: She is oriented to person, place, and time. She appears well-developed. She is cooperative.  Non-toxic appearance. She does not appear ill. No distress. "   HENT:   Head: Normocephalic and atraumatic.   Ears:   Right Ear: Hearing, tympanic membrane, external ear and ear canal normal.   Left Ear: Hearing, tympanic membrane, external ear and ear canal normal.   Nose: Rhinorrhea and congestion present. No mucosal edema or nasal deformity. No epistaxis. Right sinus exhibits no maxillary sinus tenderness and no frontal sinus tenderness. Left sinus exhibits no maxillary sinus tenderness and no frontal sinus tenderness.   Mouth/Throat: Uvula is midline, oropharynx is clear and moist and mucous membranes are normal. No trismus in the jaw. Normal dentition. No uvula swelling. No oropharyngeal exudate, posterior oropharyngeal edema or posterior oropharyngeal erythema.   Eyes: Lids are normal. Right eye exhibits exudate. Right conjunctiva is injected. No scleral icterus.       Neck: Trachea normal and phonation normal. Neck supple. No edema present. No erythema present. No neck rigidity present.   Cardiovascular: Normal rate, regular rhythm, normal heart sounds and normal pulses.   Pulmonary/Chest: Effort normal and breath sounds normal. No respiratory distress. She has no decreased breath sounds. She has no rhonchi.   Abdominal: Normal appearance.   Musculoskeletal: Normal range of motion.         General: No deformity. Normal range of motion.   Neurological: She is alert and oriented to person, place, and time. She exhibits normal muscle tone. Coordination normal.   Skin: Skin is warm, dry, intact, not diaphoretic and not pale.   Psychiatric: Her speech is normal and behavior is normal. Judgment and thought content normal.   Nursing note and vitals reviewed.      Assessment:     1. Conjunctivitis of right eye, unspecified conjunctivitis type    2. Sore throat    3. Acute rhinosinusitis        Plan:       Conjunctivitis of right eye, unspecified conjunctivitis type  -     tobramycin sulfate 0.3% (TOBREX) 0.3 % ophthalmic solution; Place 1 drop into both eyes every 6 (six)  hours. for 5 days  Dispense: 5 mL; Refill: 0    Sore throat  -     POCT Strep A, Molecular    Acute rhinosinusitis  -     benzonatate (TESSALON) 100 MG capsule; Take 1 capsule (100 mg total) by mouth 3 (three) times daily as needed for Cough.  Dispense: 20 capsule; Refill: 0        Patient Instructions   If your condition worsens we recommend that you receive another evaluation at the emergency room immediately or contact your primary medical clinics after hours call service to discuss your concerns. You must understand that you've received an Urgent Care treatment only and that you may be released before all of your medical problems are known or treated. You, the patient, will arrange for follow up care as instructed.  Drink plenty of Fluids  Wash hands frequently using mild antibacterial soap lathering for at least 15 seconds then rinse  Get plenty of Rest  Salt water gargles  Follow up in 1-2 weeks with Primary Care physician if not significantly better.   If you are not allergic please Tylenol every 4-6 hours as needed and/or Ibuprofen every 6-8 hours as needed, over the counter for pain or fever.  Take OTC Cough/Congestion medicine as needed. Talk to your pharmacist about the best option for you.

## 2025-02-05 ENCOUNTER — PATIENT MESSAGE (OUTPATIENT)
Dept: OBSTETRICS AND GYNECOLOGY | Facility: CLINIC | Age: 75
End: 2025-02-05
Payer: MEDICARE

## 2025-02-21 ENCOUNTER — TELEPHONE (OUTPATIENT)
Dept: OBSTETRICS AND GYNECOLOGY | Facility: CLINIC | Age: 75
End: 2025-02-21
Payer: MEDICARE

## 2025-02-21 NOTE — TELEPHONE ENCOUNTER
----- Message from Mio sent at 2/21/2025  9:14 AM CST -----  Contact: pt  Type:  Patient Returning CallWho Called:PTWho Left Message for Patient: Lily Does the patient know what this is regarding?: yesWould the patient rather a call back or a response via [facility]? [facility]:[phone] Additional Information:

## 2025-02-24 ENCOUNTER — HOSPITAL ENCOUNTER (OUTPATIENT)
Dept: RADIOLOGY | Facility: HOSPITAL | Age: 75
Discharge: HOME | End: 2025-02-24
Attending: INTERNAL MEDICINE
Payer: MEDICARE

## 2025-02-24 DIAGNOSIS — Z12.31 ENCOUNTER FOR SCREENING MAMMOGRAM FOR BREAST CANCER: ICD-10-CM

## 2025-02-24 PROCEDURE — 77063 BREAST TOMOSYNTHESIS BI: CPT | Mod: TC,PO

## 2025-02-24 PROCEDURE — 77063 BREAST TOMOSYNTHESIS BI: CPT | Mod: 26,,, | Performed by: RADIOLOGY

## 2025-02-24 PROCEDURE — 77067 SCR MAMMO BI INCL CAD: CPT | Mod: 26,,, | Performed by: RADIOLOGY

## 2025-03-06 ENCOUNTER — OFFICE VISIT (OUTPATIENT)
Dept: OBSTETRICS AND GYNECOLOGY | Facility: CLINIC | Age: 75
End: 2025-03-06
Payer: MEDICARE

## 2025-03-06 VITALS
SYSTOLIC BLOOD PRESSURE: 146 MMHG | BODY MASS INDEX: 21.73 KG/M2 | WEIGHT: 122.69 LBS | DIASTOLIC BLOOD PRESSURE: 70 MMHG

## 2025-03-06 DIAGNOSIS — Z01.419 WELL WOMAN EXAM WITH ROUTINE GYNECOLOGICAL EXAM: Primary | ICD-10-CM

## 2025-03-06 PROCEDURE — 99213 OFFICE O/P EST LOW 20 MIN: CPT | Mod: PBBFAC,PN | Performed by: OBSTETRICS & GYNECOLOGY

## 2025-03-06 PROCEDURE — 99999 PR PBB SHADOW E&M-EST. PATIENT-LVL III: CPT | Mod: PBBFAC,,, | Performed by: OBSTETRICS & GYNECOLOGY

## 2025-03-06 PROCEDURE — G0101 CA SCREEN;PELVIC/BREAST EXAM: HCPCS | Mod: PBBFAC,PN | Performed by: OBSTETRICS & GYNECOLOGY

## 2025-03-06 RX ORDER — TRAMADOL HYDROCHLORIDE 50 MG/1
50 TABLET ORAL EVERY 6 HOURS PRN
COMMUNITY

## 2025-03-06 NOTE — PROGRESS NOTES
HPI:   74 y.o.   OB History          2    Para   2    Term   2            AB        Living   1         SAB        IAB        Ectopic        Multiple        Live Births   1              No LMP recorded (lmp unknown). Patient is postmenopausal.    Patient is  here for her annual gynecologic exam.  She has no complaints.     ROS:  GENERAL: No fever, chills, fatigability or weight loss.  SKIN: No rashes, itching or changes in color or texture of skin.  HEAD: No headaches or recent head trauma.  EYES: Visual acuity fine. No photophobia, ocular pain or diplopia.  EARS: Denies ear pain, discharge or vertigo.  NOSE: No loss of smell, no epistaxis or postnasal drip.  MOUTH & THROAT: No hoarseness or change in voice. No excessive gum bleeding.  NODES: Denies swollen glands.  CHEST: Denies MANCERA, cyanosis, wheezing, cough and sputum production.  CARDIOVASCULAR: Denies chest pain, PND, orthopnea or reduced exercise tolerance.  ABDOMEN: Appetite fine. No weight loss. Denies diarrhea, abdominal pain, hematemesis or blood in stool.  URINARY: No flank pain, dysuria or hematuria.  PERIPHERAL VASCULAR: No claudication or cyanosis.  MUSCULOSKELETAL: No joint stiffness or swelling. Denies back pain.  NEUROLOGIC: No history of seizures, paralysis, alteration of gait or coordination.    PE:   BP (!) 146/70 (BP Location: Right arm, Patient Position: Sitting)   Wt 55.7 kg (122 lb 11 oz)   LMP  (LMP Unknown)   BMI 21.73 kg/m²   APPEARANCE: Well nourished, well developed, in no acute distress.  NECK: Neck symmetric without masses or thyromegaly.  BREASTS: Symmetrical, no skin changes or visible lesions. No palpable masses, nipple discharge or adenopathy bilaterally.  ABDOMEN: Flat. Soft. No tenderness or masses. No hepatosplenomegaly. No hernias. No CVA tenderness.  VULVA: No lesions. Normal female genitalia.  URETHRAL MEATUS: Normal size and location, no lesions, no prolapse.  URETHRA: No masses, tenderness, prolapse or  scarring.  VAGINA: Moist and well rugated, no discharge, no significant cystocele or rectocele.  CERVIX: No lesions and discharge. PAP done.  UTERUS: Normal size, regular shape, mobile, non-tender, bladder base nontender.  ADNEXA: No masses, tenderness or CDS nodularity.  ANUS PERINEUM: Normal.    PROCEDURES:  Pap smear    Assessment:  Normal Gynecologic Exam    Plan:  Mammogram and Colonoscopy if indicated by current recommendations.  Return to clinic in one year or for any problems or complaints.  No gyn co

## 2025-03-11 ENCOUNTER — OFFICE VISIT (OUTPATIENT)
Dept: FAMILY MEDICINE | Facility: CLINIC | Age: 75
End: 2025-03-11
Payer: MEDICARE

## 2025-03-11 VITALS
BODY MASS INDEX: 21.19 KG/M2 | SYSTOLIC BLOOD PRESSURE: 138 MMHG | WEIGHT: 119.63 LBS | DIASTOLIC BLOOD PRESSURE: 78 MMHG | HEART RATE: 61 BPM | OXYGEN SATURATION: 97 %

## 2025-03-11 DIAGNOSIS — Z00.01 ENCOUNTER FOR GENERAL ADULT MEDICAL EXAMINATION WITH ABNORMAL FINDINGS: Primary | ICD-10-CM

## 2025-03-11 DIAGNOSIS — D53.9 MACROCYTIC ANEMIA: ICD-10-CM

## 2025-03-11 DIAGNOSIS — I10 ESSENTIAL HYPERTENSION: ICD-10-CM

## 2025-03-11 DIAGNOSIS — E78.2 MIXED HYPERLIPIDEMIA: ICD-10-CM

## 2025-03-11 DIAGNOSIS — I47.10 SVT (SUPRAVENTRICULAR TACHYCARDIA): ICD-10-CM

## 2025-03-11 DIAGNOSIS — I48.0 PAROXYSMAL ATRIAL FIBRILLATION: ICD-10-CM

## 2025-03-11 DIAGNOSIS — I70.0 ATHEROSCLEROSIS OF AORTA: ICD-10-CM

## 2025-03-11 DIAGNOSIS — Z95.818 IMPLANTABLE LOOP RECORDER PRESENT: ICD-10-CM

## 2025-03-11 PROBLEM — M54.10 RADICULAR PAIN OF RIGHT LOWER EXTREMITY: Status: RESOLVED | Noted: 2020-10-08 | Resolved: 2025-03-11

## 2025-03-11 PROBLEM — M54.9 CHRONIC BACK PAIN: Status: ACTIVE | Noted: 2023-05-23

## 2025-03-11 PROBLEM — M70.62 TROCHANTERIC BURSITIS, LEFT HIP: Status: RESOLVED | Noted: 2020-05-11 | Resolved: 2025-03-11

## 2025-03-11 PROBLEM — M79.672 LEFT FOOT PAIN: Status: RESOLVED | Noted: 2023-03-28 | Resolved: 2025-03-11

## 2025-03-11 PROBLEM — I25.10 CORONARY ATHEROSCLEROSIS: Status: ACTIVE | Noted: 2021-10-04

## 2025-03-11 PROBLEM — R26.9 GAIT ABNORMALITY: Status: RESOLVED | Noted: 2024-07-03 | Resolved: 2025-03-11

## 2025-03-11 PROBLEM — H10.9 CONJUNCTIVITIS: Status: RESOLVED | Noted: 2024-12-04 | Resolved: 2025-03-11

## 2025-03-11 PROBLEM — G89.29 CHRONIC BACK PAIN: Status: ACTIVE | Noted: 2023-05-23

## 2025-03-11 PROBLEM — Z45.09 ENCOUNTER FOR ADJUSTMENT AND MANAGEMENT OF OTHER CARDIAC DEVICE: Status: RESOLVED | Noted: 2020-03-05 | Resolved: 2025-03-11

## 2025-03-11 PROBLEM — M70.61 TROCHANTERIC BURSITIS, RIGHT HIP: Status: RESOLVED | Noted: 2020-03-06 | Resolved: 2025-03-11

## 2025-03-11 PROBLEM — R00.0 TACHYCARDIA: Status: RESOLVED | Noted: 2025-02-26 | Resolved: 2025-03-11

## 2025-03-11 PROBLEM — M25.672 DECREASED RANGE OF MOTION OF LEFT ANKLE: Status: RESOLVED | Noted: 2024-07-03 | Resolved: 2025-03-11

## 2025-03-11 PROBLEM — E78.5 HYPERLIPIDEMIA: Status: ACTIVE | Noted: 2019-07-03

## 2025-03-11 PROBLEM — R09.89 RESPIRATORY CRACKLES: Status: RESOLVED | Noted: 2019-07-03 | Resolved: 2025-03-11

## 2025-03-11 PROBLEM — J01.90 ACUTE RHINOSINUSITIS: Status: RESOLVED | Noted: 2024-12-04 | Resolved: 2025-03-11

## 2025-03-11 PROBLEM — I65.29 CAROTID ARTERY STENOSIS: Status: ACTIVE | Noted: 2019-08-07

## 2025-03-11 PROBLEM — G25.81 RESTLESS LEGS: Status: ACTIVE | Noted: 2023-05-23

## 2025-03-11 PROCEDURE — 99204 OFFICE O/P NEW MOD 45 MIN: CPT | Mod: S$PBB,,,

## 2025-03-11 PROCEDURE — 99213 OFFICE O/P EST LOW 20 MIN: CPT | Mod: PBBFAC,PO

## 2025-03-11 PROCEDURE — 99999 PR PBB SHADOW E&M-EST. PATIENT-LVL III: CPT | Mod: PBBFAC,,,

## 2025-03-11 NOTE — PROGRESS NOTES
Patient ID: Gricelda Hassan is a 74 y.o. female.    Chief Complaint: Establish Care    History of Present Illness    CHIEF COMPLAINT:  Gricelda presents today for a follow-up visit.    CARDIOVASCULAR:  She has a history of atrial tachycardia and supraventricular tachycardia (SVT), treated with cardiac ablation 3-4 years ago. She has a loop recorder implanted with monthly result updates and denies any episodes of tachycardia since ablation. She has left carotid artery stenosis diagnosed in 2019, with recent cardiology assessment indicating no need for further testing at this time.    CHRONIC BACK PAIN:  She has chronic back pain due to L4 and L5 vertebrae misalignment managed with spinal injections every 4-6 months. She takes Tramadol 50mg every 6 hours as needed for moderate to severe pain and Tylenol for mild pain. Topical treatments including lidocaine patches, BioFreeze, and Icy Hot have provided minimal relief.    EXERCISE:  She exercises daily, alternating between walking 3 miles or cycling 8 miles. During episodes of increased back pain, she reduces exercise to 2-2.5 miles. She manages back pain during exercise by leaning to the side to continue for longer duration.    GI CONCERNS:  She has a history of chronic constipation managed primarily through dietary modifications, including consumption of 2-3 large pitted dried dates daily. She discontinued Linzess due to ineffectiveness but occasionally uses Miralax, noting delayed but effective relief. Her symptoms are currently well-controlled with her management strategy.    CURRENT MEDICATIONS:  She takes atorvastatin 40mg, calcium 1250mg, gabapentin 300mg nightly for sleep, melatonin, vitamin D, and aspirin 81mg daily (temporarily discontinued due to upcoming spinal injection). Tramadol 50mg is taken as needed for moderate to severe back pain.    SURGICAL HISTORY:  Her surgical history includes tubal ligation, multiple left foot surgeries (hammer toe  correction and two bunion removals), ovaries and cervix removal, and gallbladder removal.    SOCIAL HISTORY:  She is a former smoker with an 18-year history from 1967 to 1985, averaging half a pack per day. She consumes two glasses of wine with dinner most nights.    FAMILY HISTORY:  Family history is significant for COPD and heart disease. She denies family history of breast, ovarian, cervical, or colon cancer.    PREVENTIVE CARE:  Her most recent colonoscopy was performed approximately 3 years ago. She received flu vaccine and RSV vaccine this year at [facility], and shingles and pneumococcal vaccines a couple years ago.    OTHER MEDICAL CONDITIONS:  She experiences intermittent restless leg syndrome, with symptoms described as aggravating when present.    ALLERGIES:  She has a history of penicillin allergy manifesting as a yeast infection, though uncertain if this persists. She also reports an allergy to adhesive tape causing bruising and skin removal upon application.      ROS:  Allergic: reports allergic reactions         Problem List[1]    Medications Ordered Prior to Encounter[2]    Past Medical History:   Diagnosis Date    Acute rhinosinusitis 12/04/2024    Arthritis ?    Atrial tachycardia     Conjunctivitis 12/04/2024    Decreased range of motion of left ankle 07/03/2024    Encounter for adjustment and management of other cardiac device 03/05/2020    Gait abnormality 07/03/2024    Hyperlipidemia     Hypertension     Ischemic colitis     Left foot pain 03/28/2023    Osteoporosis 2022    Borderline    Paroxysmal atrial fibrillation 12/13/2021    Trochanteric bursitis, left hip 05/11/2020    Trochanteric bursitis, right hip 03/06/2020       Past Surgical History:   Procedure Laterality Date    ABLATION, CARDIAC ATRIUM, LINEAR OR FOCAL, ADDTL, FOR A-FIB REMAINING AFTER PULM VEIN ISOLAT      ~2022    CORRECTION OF HAMMER TOE Left 02/01/2023    Procedure: CORRECTION, HAMMER TOE;  Surgeon: Javier Hammonds MD;   Location: [facility];  Service: Orthopedics;  Laterality: Left;  2nd    FOOT HARDWARE REMOVAL Left 05/20/2024    Procedure: REMOVAL, HARDWARE, FOOT;  Surgeon: Javier Hammonds MD;  Location: [facility] OR;  Service: Orthopedics;  Laterality: Left;    HAND SURGERY  2005 and 2020    HERNIA REPAIR  1956    LAPIDUS BUNIONECTOMY Left 02/01/2023    Procedure: BUNIONECTOMY, LAPIDUS;  Surgeon: Javier Hammonds MD;  Location: [facility];  Service: Orthopedics;  Laterality: Left;    OPEN TREATMENT, DISLOCATION, MTP JOINT Left 02/01/2023    Procedure: OPEN TREATMENT, DISLOCATION, MTP JOINT;  Surgeon: Javier Hammonds MD;  Location: [facility];  Service: Orthopedics;  Laterality: Left;  2nd    OPEN TREATMENT, DISLOCATION, MTP JOINT Left 05/20/2024    Procedure: OPEN TREATMENT, DISLOCATION, MTP JOINT;  Surgeon: Javier Hammonds MD;  Location: [facility];  Service: Orthopedics;  Laterality: Left;  2nd(second)MTP Joint    SURGICAL REMOVAL OF BUNION WITH OSTEOTOMY OF METATARSAL BONE Left 05/20/2024    Procedure: BUNIONECTOMY, WITH METATARSAL OSTEOTOMY;  Surgeon: Javier Hammonds MD;  Location: [facility];  Service: Orthopedics;  Laterality: Left;    SURGICAL REMOVAL OF METATARSAL HEAD Left 02/01/2023    Procedure: OSTECTOMY, METATARSAL BONE, HEAD;  Surgeon: Javier Hammonds MD;  Location: [facility];  Service: Orthopedics;  Laterality: Left;    TUBAL LIGATION          Family History   Problem Relation Name Age of Onset    COPD Mother Michelle Darby     Heart disease Father Crow darby         CABG age 60    Hypertension Sister Ischemic colitis     Hypertension Brother Lencho darby     Neuropathy Brother Lencho darby     No Known Problems Daughter Ellen     No Known Problems Daughter Charline     Breast cancer Neg Hx      Ovarian cancer Neg Hx      Cervical cancer Neg Hx      Colon cancer Neg Hx         Social History[3]    Review of patient's allergies indicates:   Allergen Reactions    Rosuvastatin Other (See Comments)    Adhesive tape-silicones Other (See  Comments)    Penicillins Rash     Yeast infection          Health Maintenance Due   Topic Date Due    Hepatitis C Screening  Never done    Colorectal Cancer Screening  11/28/2021    COVID-19 Vaccine (4 - 2024-25 season) 09/01/2024       Objective     Vitals:    03/11/25 0715   BP: 138/78   Pulse: 61      Body mass index is 21.19 kg/m².     Physical Exam  Vitals and nursing note reviewed.   Constitutional:       General: She is not in acute distress.     Appearance: Normal appearance. She is not ill-appearing or toxic-appearing.   HENT:      Head: Normocephalic and atraumatic.      Nose: Nose normal.      Mouth/Throat:      Mouth: Mucous membranes are moist.   Eyes:      Extraocular Movements: Extraocular movements intact.   Cardiovascular:      Rate and Rhythm: Normal rate and regular rhythm.      Heart sounds: Normal heart sounds. No murmur heard.     No friction rub. No gallop.   Pulmonary:      Effort: Pulmonary effort is normal.      Breath sounds: Normal breath sounds. No wheezing, rhonchi or rales.   Abdominal:      General: Bowel sounds are normal.      Tenderness: There is no abdominal tenderness. There is no guarding.   Musculoskeletal:      Cervical back: Neck supple.      Right lower leg: No edema.      Left lower leg: No edema.   Lymphadenopathy:      Cervical: No cervical adenopathy.   Skin:     General: Skin is warm.   Neurological:      Mental Status: She is alert and oriented to person, place, and time.   Psychiatric:         Mood and Affect: Mood normal.         Behavior: Behavior normal.         Assessment & Plan    Reviewed labs: MCV elevated but likely patient's baseline; other values within normal limits  Assessed cholesterol levels: LDL slightly elevated but acceptable given non-diabetic status and elevated HDL  Evaluated back pain: Continued conservative management with injections; surgery not recommended due to age-related healing concerns  Considered colonoscopy: Not recommended due to  increased perforation risk in 70+ age group  Assessed blood pressure target: 138/78 appropriate for age  Evaluated SVT history: Ablation in 2022 successful; no episodes since then per loop recorder  Carotid artery stenosis: No further testing needed due to well-controlled cholesterol    ATRIAL TACHYCARDIA AND PAROXYSMAL ATRIAL FIBRILLATION:  - Provided information on cardiac ablation procedure and its effects.  - Noted that the patient had an ablation for atrial tachycardia 3-4 years ago and has a loop recorder that is monitored monthly.  - Confirmed that the patient has not had any episodes since the ablation.  - Auscultated the patient's heart, which sounds excellent upon exam.  - Acknowledged that the patient's SVT concerns have resolved.  - Continued monthly monitoring through the loop recorder.  - Acknowledged the patient's history of atrial tachycardia and paroxysmal atrial fibrillation.  - Noted that the patient has a loop recorder implanted for cardiac monitoring.  - Confirmed that the patient receives monthly monitoring results from the loop recorder.  - Instructed the patient to follow up with cardiology as scheduled for loop recorder monitoring.    HYPERTENSION:  - Explained age-related changes in blood pressure targets and risks of over-treatment.  - Instructed the patient to contact the office if blood pressure is consistently below 120/80 or if experiencing dizziness upon position changes.    CAROTID ARTERY STENOSIS:  - Noted that the patient had carotid artery stenosis in the left carotid artery in 2019.  - Acknowledged that the cardiologist reported no tests were needed and that the patient is doing well.    HYPERLIPIDEMIA:  - Continued atorvastatin 40mg daily.  - Assessed that the patient is stable due to well-controlled cholesterol over the past few years.    BACK PAIN:  - Noted that the patient reports back issues with L4 and L5 out of alignment, requiring spinal injections every 4-6 months.  -  Observed that the patient experiences pain in the left side of the back.  - Acknowledged the patient's reports of walking issues due to back pain.  - Recognized the patient's back pain and desire to avoid surgery.  - Confirmed that the patient is scheduled for spinal injection next Monday.  - Noted that the patient takes Tramadol 50mg as needed for severe back pain.  - Continued gabapentin 300mg nightly.  - Continued Tramadol 50mg every 6 hours as needed for moderate to severe pain.  - Advised that the patient can take 2 doses 4-6 hours apart if needed on bad days.  - Instructed the patient to take Tylenol up to 3000mg daily for mild pain before using Tramadol.  - Noted that the patient takes Gabapentin 300mg nightly for nerve pain and sleep.  - Recommend using Tylenol for mild pain and Tramadol for moderate to severe pain.    CONSTIPATION:  - Advised the patient to continue consuming 2-3 dried dates daily for constipation management.  - Noted that the patient reports variable constipation.  - Assessed the patient's constipation management and fluid intake.  - Confirmed that the patient manages constipation with dietary changes (dates) and adequate water intake.  - Recommend OTC Miralax for persistent constipation.    RESTLESS LEGS SYNDROME:  - Noted that the patient reports ongoing restless legs symptoms, not occurring   nightly.  - Considered possible causes of restless legs syndrome.    MEDICATIONS/SUPPLEMENTS:  - Continued aspirin 81mg daily, with instruction to discontinue before spinal injections.  - Continued melatonin.  - Continued calcium 1250mg daily.  - Continued vitamin D.    OVARIAN HISTORY:  - Acknowledged the patient's history of ovary removal.    ALLERGIES:  - Noted that the patient reports a history of penicillin allergy, unsure if still allergic.  - Documented that the patient reports yeast infection as a reaction to penicillin.  - Noted that the patient reports allergy to adhesive tape, causing  skin bruising and removal.    OTHER INSTRUCTIONS:  - Discussed rationale for not recommending colonoscopy in 70+ age group.  - Gricelda to continue current exercise regimen of walking 3 miles or cycling 8 miles daily.  - Gricelda to maintain water intake.         Encounter for general adult medical examination with abnormal findings    Mixed hyperlipidemia    Essential hypertension    Paroxysmal atrial fibrillation    Atherosclerosis of aorta    Macrocytic anemia  Comments:  cleared by heme onc    Implantable loop recorder present    SVT (supraventricular tachycardia)  Comments:  s/p cardiac ablation 2022        Follow up in 5 months (on 8/11/2025), or if symptoms worsen or fail to improve.    This note was generated with the assistance of ambient listening technology. Verbal consent was obtained by the patient and accompanying visitor(s) for the recording of patient appointment to facilitate this note. I attest to having reviewed and edited the generated note for accuracy, though some syntax or spelling errors may persist. Please contact the author of this note for any clarification.        Samantha Galvez MD  03/11/2025          [1]   Patient Active Problem List  Diagnosis    Mixed hyperlipidemia    Essential hypertension    Constipation, chronic    Former smoker: quit 1/1/1985: < 30 pack years    Osteopenia of multiple sites: see DEXA 2017    AVNRT (AV masood re-entry tachycardia)    Atherosclerosis of aorta    Carotid artery stenosis    Chronic back pain    Restless legs    Supraventricular tachycardia    Coronary atherosclerosis    Hypertensive disorder    Hyperlipidemia   [2]   Current Outpatient Medications on File Prior to Visit   Medication Sig Dispense Refill    aspirin (ECOTRIN) 81 MG EC tablet Take 81 mg by mouth once daily.      atorvastatin (LIPITOR) 40 MG tablet Take 1 tablet by mouth every evening.      calcium carbonate (OS-PAULINE) 500 mg calcium (1,250 mg) tablet Take 1 tablet by mouth once daily.       cyanocobalamin 500 MCG tablet Vitamin B-12 500 mcg tablet   1 tablet; 500 MCG; Once a day      gabapentin (NEURONTIN) 300 MG capsule Take 300 mg by mouth every evening.      melatonin 3 mg Cap Take 1 capsule as needed by oral route.      olmesartan (BENICAR) 20 MG tablet Take 1 tablet by mouth once daily.      traMADoL (ULTRAM) 50 mg tablet Take 50 mg by mouth every 6 (six) hours as needed.      vitamin D (VITAMIN D3) 1000 units Tab Take 1,000 Units by mouth once daily.       Current Facility-Administered Medications on File Prior to Visit   Medication Dose Route Frequency Provider Last Rate Last Admin    [DISCONTINUED] diphenhydrAMINE injection 25 mg  25 mg Intravenous Q6H PRN Saroj Best MD        [DISCONTINUED] fentaNYL 50 mcg/mL injection 25 mcg  25 mcg Intravenous Q5 Min PRN Saroj Best MD        [DISCONTINUED] HYDROmorphone (PF) injection 0.2 mg  0.2 mg Intravenous Q5 Min PRN Saroj Best MD        [DISCONTINUED] lactated ringers infusion   Intravenous Continuous Saroj Bets MD        [DISCONTINUED] LIDOcaine (PF) 10 mg/ml (1%) injection 10 mg  1 mL Intradermal Once Saroj Best MD        [DISCONTINUED] midazolam injection 0.5 mg  0.5 mg Intravenous PRN Saroj Best MD        [DISCONTINUED] oxyCODONE immediate release tablet 5 mg  5 mg Oral Q3H PRN Saroj Best MD        [DISCONTINUED] prochlorperazine injection Soln 5 mg  5 mg Intravenous Q4H PRN Saroj Best MD        [DISCONTINUED] sodium chloride 0.9% bolus 1,000 mL  1,000 mL Intravenous Once Idalia Aguilera NP        [DISCONTINUED] sodium chloride 0.9% flush 10 mL  10 mL Intravenous Once Saroj Best MD       [3]   Social History  Socioeconomic History    Marital status:    Occupational History     Employer: [employer]   Tobacco Use    Smoking status: Former     Current packs/day: 0.00     Average packs/day: 0.5 packs/day for 16.0 years (8.0 ttl pk-yrs)     Types: Cigarettes     Start  date: 1969     Quit date: 1985     Years since quittin.2    Smokeless tobacco: Never    Tobacco comments:     Started at 18yo, quit [id number]....19years, 1/2 pack..9 pack year    Substance and Sexual Activity    Alcohol use: Yes     Alcohol/week: 1.0 standard drink of alcohol     Types: 1 Glasses of wine per week     Comment: occ    Drug use: No    Sexual activity: Yes     Partners: Male     Birth control/protection: Post-menopausal     Comment:      Social Drivers of Health     Physical Activity: Sufficiently Active (3/1/2024)    Exercise Vital Sign     Days of Exercise per Week: 7 days     Minutes of Exercise per Session: 60 min   Stress: No Stress Concern Present (3/1/2024)    [employer] - Occupational Stress Questionnaire     Feeling of Stress : Not at all

## 2025-03-12 ENCOUNTER — RESULTS FOLLOW-UP (OUTPATIENT)
Dept: OBSTETRICS AND GYNECOLOGY | Facility: CLINIC | Age: 75
End: 2025-03-12

## 2025-03-14 ENCOUNTER — PATIENT MESSAGE (OUTPATIENT)
Dept: FAMILY MEDICINE | Facility: CLINIC | Age: 75
End: 2025-03-14
Payer: MEDICARE

## 2025-03-26 ENCOUNTER — PATIENT MESSAGE (OUTPATIENT)
Dept: ORTHOPEDICS | Facility: CLINIC | Age: 75
End: 2025-03-26
Payer: MEDICARE

## 2025-03-27 ENCOUNTER — TELEPHONE (OUTPATIENT)
Dept: ORTHOPEDICS | Facility: CLINIC | Age: 75
End: 2025-03-27
Payer: MEDICARE

## 2025-03-31 DIAGNOSIS — M79.672 LEFT FOOT PAIN: Primary | ICD-10-CM

## 2025-04-01 ENCOUNTER — OFFICE VISIT (OUTPATIENT)
Dept: ORTHOPEDICS | Facility: CLINIC | Age: 75
End: 2025-04-01
Payer: MEDICARE

## 2025-04-01 ENCOUNTER — HOSPITAL ENCOUNTER (OUTPATIENT)
Dept: RADIOLOGY | Facility: HOSPITAL | Age: 75
Discharge: HOME | End: 2025-04-01
Attending: ORTHOPAEDIC SURGERY
Payer: MEDICARE

## 2025-04-01 VITALS — BODY MASS INDEX: 21.17 KG/M2 | HEIGHT: 63 IN | WEIGHT: 119.5 LBS

## 2025-04-01 DIAGNOSIS — M79.672 LEFT FOOT PAIN: ICD-10-CM

## 2025-04-01 DIAGNOSIS — M20.42 ACQUIRED HAMMERTOE OF LEFT FOOT: Primary | ICD-10-CM

## 2025-04-01 PROCEDURE — 73630 X-RAY EXAM OF FOOT: CPT | Mod: 26,LT,, | Performed by: RADIOLOGY

## 2025-04-01 PROCEDURE — 99214 OFFICE O/P EST MOD 30 MIN: CPT | Mod: S$PBB,,, | Performed by: ORTHOPAEDIC SURGERY

## 2025-04-01 PROCEDURE — 99999 PR PBB SHADOW E&M-EST. PATIENT-LVL III: CPT | Mod: PBBFAC,,, | Performed by: ORTHOPAEDIC SURGERY

## 2025-04-01 PROCEDURE — 99213 OFFICE O/P EST LOW 20 MIN: CPT | Mod: PBBFAC,25,PO | Performed by: ORTHOPAEDIC SURGERY

## 2025-04-01 PROCEDURE — 73630 X-RAY EXAM OF FOOT: CPT | Mod: TC,PO,LT

## 2025-04-01 NOTE — PROGRESS NOTES
"Status/Diagnosis: Left hallux valgus with crossover toe deformity; 2nd MTP dorsal dislocation; 2nd metatarsalgia; rigid 2nd hammertoe  Date of Surgery: 02/01/2023; 05/20/2024  Date of Injury: none  Return visit: PRN  X-rays on Return: pending patient complaint    Present History:  Gricelda Hassan is a 74 y.o. female who returns today after last being seen in November 2024.  Endorses a several week history of acute onset left 3rd toe pain.  Reports she turned wrong" while working at a [employer].  Difficult to recreate in the pain.  Does not bother her at all times, specifically does not bother her when weight-bearing but only with certain movements.  She has tried some degree of shoe wear and activity modification with minimal relief.  Currently using an offloading metatarsal pad.    At baseline patient exercises regularly.  Walk 3-4 miles per day.  Bikes 10+ miles.      Physical exam:  There were no vitals filed for this visit.    Body mass index is 21.17 kg/m².  General: In no apparent distress; well developed and well nourished.  HEENT: normocephalic; atraumatic.  Cardiovascular: regular rate.  Respiratory: no increased work of breathing.  Musculoskeletal:   Gait:  Did not assess  Inspection:  Overall alignment well-maintained status post hallux valgus correction and relocation of 2nd toe at the level of the MTP joint.    Little to no residual swelling.  No residual laxity of the 2nd MTP joint.  Still some mild prominence involving the dorsal medial aspect of the 1st metatarsal head however I reiterated to patient that resecting this would cause significant bone loss.    Subtle prominence of DM HW.  No tenderness at the distal 1st metatarsal osteotomy site on deep palpation.  1st MTP range of motion from 10° dorsiflexion to 20° plantar flexion.  Second MTP range of motion from neutral to 10° plantar flexion.  Still with some degree of altered sensation along the dorsal medial midfoot but improved.  Otherwise " gross motor and sensory function intact.  Palpable pedal pulse.  Repeat exam with pain localized diffusely about the 3rd toe.  Semi flexible 3rd hammertoe deformity.  No tenderness over the metatarsal head, 3rd MTP joint, 2nd and 3rd intermetatarsal spaces, etc.   Negative metatarsal squeeze testing.  Unable to recreate symptoms on provocative testing today.     Imaging Studies/Outside documentation:  I have ordered/reviewed/interpreted the following images/outside documentation:  WB 3-views Left foot:  As previously noted, patient is status post hallux valgus correction with 1st TMT joint fusion and proximal phalangeal osteotomy.    Now patient status post distal 1st metatarsal osteotomy with lateral translation and screw fixation.  Overall alignment well-maintained.  No evidence of implant loosening or failure.  Continued signs of bony bridging at the 1st metatarsal osteotomy site.  Also status post open relocation of the 2nd MTP joint.  No evidence of residual subluxation however there is progressive 2nd MTP joint space narrowing present.  Third hammertoe deformity present.       Assessment:  Gricelda Hassan is a 74 y.o. female with Left hallux valgus with crossover toe deformity; 2nd MTP dorsal dislocation; 2nd metatarsalgia; rigid 2nd hammertoe.     Plan:   Clinical and radiographic findings were discussed.    As noted above, unable to recreate symptoms on provocative testing.  No evidence of metatarsalgia, 3rd MTP synovitis, etc.  Possible symptomatic 3rd hammertoe.  Given information for a budin type splint.  Patient will obtain this on her own.  She voiced understanding.  All questions were answered.  She will call regarding future follow-up.      This note was created using voice recognition software and may contain grammatical errors.

## 2025-04-15 ENCOUNTER — PATIENT MESSAGE (OUTPATIENT)
Dept: FAMILY MEDICINE | Facility: CLINIC | Age: 75
End: 2025-04-15
Payer: MEDICARE

## 2025-04-15 ENCOUNTER — E-VISIT (OUTPATIENT)
Dept: URGENT CARE | Facility: CLINIC | Age: 75
End: 2025-04-15
Payer: MEDICARE

## 2025-04-15 ENCOUNTER — OFFICE VISIT (OUTPATIENT)
Dept: FAMILY MEDICINE | Facility: CLINIC | Age: 75
End: 2025-04-15
Payer: MEDICARE

## 2025-04-15 DIAGNOSIS — N30.90 CYSTITIS WITHOUT HEMATURIA: Primary | ICD-10-CM

## 2025-04-15 DIAGNOSIS — R39.9 UTI SYMPTOMS: Primary | ICD-10-CM

## 2025-04-15 RX ORDER — NITROFURANTOIN 25; 75 MG/1; MG/1
100 CAPSULE ORAL 2 TIMES DAILY
Qty: 10 CAPSULE | Refills: 0 | Status: CANCELLED | OUTPATIENT
Start: 2025-04-15 | End: 2025-04-20

## 2025-04-15 RX ORDER — NITROFURANTOIN 25; 75 MG/1; MG/1
100 CAPSULE ORAL 2 TIMES DAILY
Qty: 10 CAPSULE | Refills: 0 | Status: SHIPPED | OUTPATIENT
Start: 2025-04-15 | End: 2025-04-20

## 2025-04-15 RX ORDER — PHENAZOPYRIDINE HYDROCHLORIDE 200 MG/1
200 TABLET, FILM COATED ORAL 3 TIMES DAILY PRN
Qty: 9 TABLET | Refills: 0 | Status: SHIPPED | OUTPATIENT
Start: 2025-04-15 | End: 2025-04-18

## 2025-04-15 NOTE — PROGRESS NOTES
Patient ID: Gricelda Hassan is a 74 y.o. female.    Chief Complaint: Urinary Tract Infection (Entered automatically based on patient selection in [facility].)          274}  The patient initiated a request through [facility] on 4/15/2025 for evaluation and management with a chief complaint of Urinary Tract Infection (Entered automatically based on patient selection in [facility].)     I evaluated the questionnaire submission on 04/15/2025 .    Total Time (in minutes): 13     [facility] Peq Evisit Uti Questionnaire    4/15/2025 12:04 PM CDT - Filed by Patient   Do you agree to participate in an E-Visit? Yes   If you have any of the following symptoms, please go to the nearest emergency room or call 911: I acknowledge   Choose the state of your primary residence [place]   What is the main issue you would like addressed today? Urinary tract problems   Do you have any of the following symptoms? Discomfort or pain passing urine;  Passing urine more frequently   When did your concern begin? 4/14/2025   What medications or treatments have you used to help your symptoms? Drinking more fluids;  Painkillers   What does your urine look like? Light yellow   Have you had any of the following symptoms during the past 24 hours?   Urgency (a sudden and uncontrollable urge to urinate) Mild   Frequency (going to the toilet very often) Mild   Burning pain when urinating Moderate   Incomplete bladder emptying (still feel like you need to urinate again after urination) (None, Mild, Moderate, Severe) Mild   Pain in the lower belly when youre not urinating. Moderate   Discomfort from your urinary symptoms. Moderate   Have you had any of the following symptoms during the past 24 hours?   Blood seen in the urine None   Pain in the lower back on one or both sides (flank pain) None   Abnormal Vaginal Discharge (abnormal amount, color and/or odor) None   Discharge from the opening you urinate from (urethra) when not urinating. None   Have your symptoms  interfered with your every day activities? Mild   Do you have a fever? No   Are you a diabetic? No   Are you currently experiencing any of the following while completing this questionnaire? None   Do you have a history of kidney stones? No   Provide any additional information you feel is important. I have taken 2 doses of Urinary Pain Relief  from [facility] plus one extra strength Tylenol.   Please attach any relevant images or files (if you have performed a home test for UTI, please submit a photo of results)    Are you able to take your vital signs? No          Active Problem List with Overview Notes    Diagnosis Date Noted    Chronic back pain 05/23/2023    Restless legs 05/23/2023    Atherosclerosis of aorta 02/23/2023    Coronary atherosclerosis 10/04/2021     CAC: 2019 (52.3)      Carotid artery stenosis 08/07/2019     Atherosclerosis of left carotid artery; W/U Status: confirmed      Supraventricular tachycardia 07/04/2019     Atrial tachycardia; W/U Status: confirmed      Hypertensive disorder 07/03/2019     HTN (hypertension); W/U Status: confirmed      Hyperlipidemia 07/03/2019 6/21: Tchol 182, HDL 84, LDL 86 Dyslipidemia; W/U Status: confirmed      AVNRT (AV masood re-entry tachycardia)     Osteopenia of multiple sites: see DEXA 2017 03/19/2015    Former smoker: quit 1/1/1985: < 30 pack years 03/13/2014    Constipation, chronic 03/13/2013    Mixed hyperlipidemia     Essential hypertension       Recent Labs Obtained:  Lab Results   Component Value Date    WBC 4.10 03/19/2024    HGB 13.0 03/19/2024    HCT 39.0 03/19/2024     (H) 03/19/2024     03/19/2024     03/19/2024    K 5.1 03/19/2024    GLU 94 03/19/2024    CREATININE 0.8 03/19/2024    EGFRNORACEVR >60.0 03/19/2024    HGBA1C 5.0 03/19/2024    TSH 1.351 07/06/2021      Review of patient's allergies indicates:   Allergen Reactions    Rosuvastatin Other (See Comments)    Adhesive tape-silicones Other (See Comments)    Penicillins  Rash     Yeast infection         Encounter Diagnosis   Name Primary?    Cystitis without hematuria Yes        Orders Placed This Encounter   Procedures    Urinalysis, Reflex to Urine Culture     Standing Status:   Future     Expected Date:   4/15/2025     Expiration Date:   6/14/2026     Specimen Source:   Urine      Medications Ordered This Encounter   Medications    nitrofurantoin, macrocrystal-monohydrate, (MACROBID) 100 MG capsule     Sig: Take 1 capsule (100 mg total) by mouth 2 (two) times daily. for 5 days     Dispense:  10 capsule     Refill:  0    phenazopyridine (PYRIDIUM) 200 MG tablet     Sig: Take 1 tablet (200 mg total) by mouth 3 (three) times daily as needed for Pain.     Dispense:  9 tablet     Refill:  0        E-Visit Time Tracking:    Day 1 Time (in minutes): 13    Total Time (in minutes): 13      274}

## 2025-04-15 NOTE — PROGRESS NOTES
PATIENT ALREADY SUBMITTED AN E-VISIT AND RECEIVED HER PRESCRIPTION AT THE PHARMACY.     REVIEWED MEDICATIONS, ALLERGIES, FOLLOW UP CARE.       DO NOT BILL.       Dysuria   This is a recurrent problem. The current episode started yesterday. The problem occurs every urination. The problem has been waxing and waning. The quality of the pain is described as aching. The pain is at a severity of 6/10. The pain is moderate. There has been no fever. She is Not sexually active. There is No history of pyelonephritis. Associated symptoms include frequency and urgency. Pertinent negatives include no behavior changes, chills, discharge, flank pain, hematuria, hesitancy, nausea, possible pregnancy, sweats, vomiting, weight loss, constipation, rash or withholding. She has tried increased fluids for the symptoms. The treatment provided no relief. Her past medical history is significant for hypertension. There is no history of catheterization, diabetes insipidus, diabetes mellitus, genitourinary reflux, kidney stones, recurrent UTIs, a single kidney, STD, urinary stasis or a urological procedure.       Review of Systems   Constitutional:  Negative for chills and weight loss.   Gastrointestinal:  Negative for constipation, nausea and vomiting.   Genitourinary:  Positive for dysuria, frequency and urgency. Negative for flank pain, hematuria and hesitancy.   Integumentary:  Negative for rash.       No other complaints or concerns. I answered all of patient's questions.     Objective:  Physical Exam  Constitutional:       Appearance: Normal appearance.   Pulmonary:      Effort: Pulmonary effort is normal. No respiratory distress.   Neurological:      Mental Status: Alert and oriented to person, place, and time.   Psychiatric:         Attention and Perception: Attention normal.         Mood and Affect: Mood normal. Mood is not anxious.         Behavior: Behavior normal.     Plan:  1. UTI symptoms        No follow-ups on file.      Face  to Face time with patient: 5 minutes  8 minutes of total time spent on the encounter, which includes face to face time and non-face to face time preparing to see the patient (eg, review of tests), Obtaining and/or reviewing separately obtained history, Documenting clinical information in the electronic or other health record, Independently interpreting results (not separately reported) and communicating results to the patient/family/caregiver, or Care coordination (not separately reported).     Each patient to whom he or she provides medical services by telemedicine is:  (1) informed of the relationship between the physician and patient and the respective role of any other health care provider with respect to management of the patient; and (2) notified that he or she may decline to receive medical services by telemedicine and may withdraw from such care at any time.

## 2025-04-16 ENCOUNTER — LAB VISIT (OUTPATIENT)
Dept: LAB | Facility: HOSPITAL | Age: 75
End: 2025-04-16
Payer: MEDICARE

## 2025-04-16 DIAGNOSIS — N30.90 CYSTITIS WITHOUT HEMATURIA: ICD-10-CM

## 2025-04-16 LAB
BACTERIA #/AREA URNS AUTO: NORMAL /HPF
BILIRUB UR QL STRIP.AUTO: ABNORMAL
CLARITY UR: CLEAR
COLOR UR AUTO: ABNORMAL
GLUCOSE UR QL STRIP: NEGATIVE
HGB UR QL STRIP: NEGATIVE
KETONES UR QL STRIP: NEGATIVE
LEUKOCYTE ESTERASE UR QL STRIP: NEGATIVE
MICROSCOPIC COMMENT: NORMAL
NITRITE UR QL STRIP: POSITIVE
PH UR STRIP: 6 [PH]
PROT UR QL STRIP: NEGATIVE
RBC #/AREA URNS AUTO: 1 /HPF (ref 0–4)
SP GR UR STRIP: 1.01
SQUAMOUS #/AREA URNS AUTO: <1 /HPF
UROBILINOGEN UR STRIP-ACNC: ABNORMAL EU/DL
WBC #/AREA URNS AUTO: 1 /HPF (ref 0–5)

## 2025-04-16 PROCEDURE — 81001 URINALYSIS AUTO W/SCOPE: CPT

## 2025-04-18 ENCOUNTER — PATIENT MESSAGE (OUTPATIENT)
Dept: FAMILY MEDICINE | Facility: CLINIC | Age: 75
End: 2025-04-18
Payer: MEDICARE

## 2025-04-22 ENCOUNTER — PATIENT MESSAGE (OUTPATIENT)
Dept: OBSTETRICS AND GYNECOLOGY | Facility: CLINIC | Age: 75
End: 2025-04-22
Payer: MEDICARE

## 2025-04-22 RX ORDER — ESTRADIOL 0.1 MG/G
1 CREAM VAGINAL
Qty: 42.5 G | Refills: 1 | Status: SHIPPED | OUTPATIENT
Start: 2025-04-24 | End: 2026-04-24

## 2025-04-22 NOTE — TELEPHONE ENCOUNTER
Left voicemail trying to return patient phone call.     I keep getting bacterial UTIs and Valentin read that Vaginal Estrogen Cream helps prevent them from recurring.  Can you please order a Rx for me at the [facility] on [address]?  Thanks very much.   Gricelda Hassan      [facility] #[id number] [address] OF [address]

## 2025-04-28 DIAGNOSIS — Z00.00 ENCOUNTER FOR MEDICARE ANNUAL WELLNESS EXAM: ICD-10-CM

## 2025-04-30 DIAGNOSIS — I10 HYPERTENSIVE DISORDER: ICD-10-CM

## 2025-05-18 ENCOUNTER — PATIENT MESSAGE (OUTPATIENT)
Dept: FAMILY MEDICINE | Facility: CLINIC | Age: 75
End: 2025-05-18
Payer: MEDICARE

## 2025-05-18 ENCOUNTER — PATIENT MESSAGE (OUTPATIENT)
Dept: ORTHOPEDICS | Facility: CLINIC | Age: 75
End: 2025-05-18
Payer: MEDICARE

## 2025-05-20 DIAGNOSIS — R22.42 MASS OF FOOT, LEFT: Primary | ICD-10-CM

## 2025-05-28 ENCOUNTER — PATIENT MESSAGE (OUTPATIENT)
Dept: ORTHOPEDICS | Facility: CLINIC | Age: 75
End: 2025-05-28
Payer: MEDICARE

## 2025-05-29 ENCOUNTER — HOSPITAL ENCOUNTER (OUTPATIENT)
Dept: RADIOLOGY | Facility: HOSPITAL | Age: 75
Discharge: HOME | End: 2025-05-29
Attending: ORTHOPAEDIC SURGERY
Payer: MEDICARE

## 2025-05-29 DIAGNOSIS — R22.42 MASS OF FOOT, LEFT: ICD-10-CM

## 2025-05-29 PROCEDURE — 73700 CT LOWER EXTREMITY W/O DYE: CPT | Mod: 26,LT,, | Performed by: RADIOLOGY

## 2025-05-29 PROCEDURE — 73700 CT LOWER EXTREMITY W/O DYE: CPT | Mod: TC,PO,LT

## 2025-06-03 ENCOUNTER — OFFICE VISIT (OUTPATIENT)
Dept: ORTHOPEDICS | Facility: CLINIC | Age: 75
End: 2025-06-03
Payer: MEDICARE

## 2025-06-03 DIAGNOSIS — T84.498A FAILED ORTHOPEDIC IMPLANT, INITIAL ENCOUNTER: Primary | ICD-10-CM

## 2025-06-03 PROCEDURE — 99212 OFFICE O/P EST SF 10 MIN: CPT | Mod: PBBFAC,PO | Performed by: ORTHOPAEDIC SURGERY

## 2025-06-03 PROCEDURE — 99999 PR PBB SHADOW E&M-EST. PATIENT-LVL II: CPT | Mod: PBBFAC,,, | Performed by: ORTHOPAEDIC SURGERY

## 2025-06-03 PROCEDURE — 99214 OFFICE O/P EST MOD 30 MIN: CPT | Mod: S$PBB,,, | Performed by: ORTHOPAEDIC SURGERY

## 2025-06-03 NOTE — H&P (VIEW-ONLY)
Status/Diagnosis: Left hallux valgus with crossover toe deformity; 2nd MTP dorsal dislocation; 2nd metatarsalgia; rigid 2nd hammertoe.  Prominent Left 1st metatarsal HW.  Date of Surgery: 02/01/2023; 05/20/2024  Date of Injury: none  Return visit: 2 weeks postop  X-rays on Return: NWB 3-views Left foot    Present History:  Gricelda Hassan is a 74 y.o. female who returns today with continued complaints of pain and irritation over the dorsal 1st metatarsal base.  CT was recently obtained.  Presents today for these results and to discuss potential surgery.  Currently wearing a hammertoe splint over the 3rd toe with moderate relief.      Physical exam:  There were no vitals filed for this visit.    There is no height or weight on file to calculate BMI.  General: In no apparent distress; well developed and well nourished.  HEENT: normocephalic; atraumatic.  Cardiovascular: regular rate.  Respiratory: no increased work of breathing.  Musculoskeletal:   Gait:  Did not assess  Inspection:  Overall alignment well-maintained status post hallux valgus correction and relocation of 2nd toe at the level of the MTP joint.    Little to no residual swelling.  No residual laxity of the 2nd MTP joint.  Prominence involving the dorsal medial aspect of the 1st metatarsal diaphysis.  No tenderness at the distal 1st metatarsal osteotomy site on deep palpation.  1st MTP range of motion from 10° dorsiflexion to 20° plantar flexion.  Second MTP range of motion from neutral to 10° plantar flexion.  Still with some degree of altered sensation along the dorsal medial midfoot but improved.  Otherwise gross motor and sensory function intact.  Palpable pedal pulse.  Mild 3rd hammertoe deformity, unchanged. Negative metatarsal squeeze testing.  Unable to recreate symptoms on provocative testing today.     Imaging Studies/Outside documentation:  I have ordered/reviewed/interpreted the following images/outside documentation:  WB 3-views Left  foot:  As previously noted, patient is status post hallux valgus correction with 1st TMT joint fusion and proximal phalangeal osteotomy.    Now patient status post distal 1st metatarsal osteotomy with lateral translation and screw fixation.  Overall alignment well-maintained.  No evidence of implant loosening or failure.  Continued signs of bony bridging at the 1st metatarsal osteotomy site.  Also status post open relocation of the 2nd MTP joint.  No evidence of residual subluxation however there is progressive 2nd MTP joint space narrowing present.  Third hammertoe deformity present.    CT Left foot w/o contrast performed on 05/29/2025:  On my independent review, there is slight screw prominence over the dorsal aspect of the 1st metatarsal shaft involving the more medial screw head.  Incomplete union with no evidence of hardware failure.  First TMT and 2nd PIP joint fusion sites are well healed.  Mild-to-moderate DJD of the 2nd MTPJ.       Assessment:  Gricelda Hassan is a 74 y.o. female with Left hallux valgus with crossover toe deformity; 2nd MTP dorsal dislocation; 2nd metatarsalgia; rigid 2nd hammertoe.     Plan:   Clinical and radiographic findings were discussed.    Operative vs nonoperative treatment options were described. These include but are not limited to bleeding; infection; damage to surrounding nerves or vessels; persistent pain, stiffness; nonunion; malunion; recurrent deformity; need for additional procedures; amputation; blood clots; pulmonary embolus; cardiac events; stroke; and the general risks of anesthesia including anesthetic death.   We also reviewed postop protocol as well as limitations and expectations. Patient's symptoms have persisted despite conservative management to include but not limited to shoe wear and activity modifications; anti-inflammatories; and continue to affect the patient's quality of life. Patient understands and desires to proceed with surgical intervention.    Explained risks, benefits, and alternative to the patient. Asked if any questions--none.  Surgery to include but not limited to:   Left foot hardware removal and screw placement; surgery as indicated.      Patient to be placed into a soft dressing and back into her tall cam boot postoperatively.  Plans for suture removal and possible transition back to normal shoe wear after 2 week postop visit.  PATIENT TO BRING TALL BOOT DAY OF SURGERY.    This note was created using voice recognition software and may contain grammatical errors.

## 2025-06-05 RX ORDER — CLINDAMYCIN PHOSPHATE 600 MG/50ML
600 INJECTION, SOLUTION INTRAVENOUS
OUTPATIENT
Start: 2025-06-05

## 2025-06-05 RX ORDER — MUPIROCIN 20 MG/G
OINTMENT TOPICAL
OUTPATIENT
Start: 2025-06-05

## 2025-06-09 ENCOUNTER — PATIENT MESSAGE (OUTPATIENT)
Dept: ORTHOPEDICS | Facility: CLINIC | Age: 75
End: 2025-06-09
Payer: MEDICARE

## 2025-06-27 ENCOUNTER — ANESTHESIA EVENT (OUTPATIENT)
Dept: SURGERY | Facility: HOSPITAL | Age: 75
End: 2025-06-27
Payer: MEDICARE

## 2025-06-30 ENCOUNTER — ANESTHESIA (OUTPATIENT)
Dept: SURGERY | Facility: HOSPITAL | Age: 75
End: 2025-06-30
Payer: MEDICARE

## 2025-06-30 ENCOUNTER — PATIENT MESSAGE (OUTPATIENT)
Dept: ORTHOPEDICS | Facility: CLINIC | Age: 75
End: 2025-06-30

## 2025-06-30 ENCOUNTER — HOSPITAL ENCOUNTER (OUTPATIENT)
Facility: HOSPITAL | Age: 75
Discharge: HOME | End: 2025-06-30
Attending: ORTHOPAEDIC SURGERY | Admitting: ORTHOPAEDIC SURGERY
Payer: MEDICARE

## 2025-06-30 VITALS
HEART RATE: 56 BPM | HEIGHT: 63 IN | BODY MASS INDEX: 21.09 KG/M2 | TEMPERATURE: 97 F | DIASTOLIC BLOOD PRESSURE: 63 MMHG | RESPIRATION RATE: 14 BRPM | SYSTOLIC BLOOD PRESSURE: 161 MMHG | WEIGHT: 119 LBS | OXYGEN SATURATION: 98 %

## 2025-06-30 DIAGNOSIS — T84.498A FAILED ORTHOPEDIC IMPLANT, INITIAL ENCOUNTER: Primary | ICD-10-CM

## 2025-06-30 PROCEDURE — 71000015 HC POSTOP RECOV 1ST HR: Mod: PO | Performed by: ORTHOPAEDIC SURGERY

## 2025-06-30 PROCEDURE — 36000707: Mod: PO | Performed by: ORTHOPAEDIC SURGERY

## 2025-06-30 PROCEDURE — 20680 REMOVAL OF IMPLANT DEEP: CPT | Mod: AS,,, | Performed by: PHYSICIAN ASSISTANT

## 2025-06-30 PROCEDURE — 20680 REMOVAL OF IMPLANT DEEP: CPT | Mod: ,,, | Performed by: ORTHOPAEDIC SURGERY

## 2025-06-30 PROCEDURE — 37000008 HC ANESTHESIA 1ST 15 MINUTES: Mod: PO | Performed by: ORTHOPAEDIC SURGERY

## 2025-06-30 PROCEDURE — 25000003 PHARM REV CODE 250: Mod: PO | Performed by: ORTHOPAEDIC SURGERY

## 2025-06-30 PROCEDURE — 36000706: Mod: PO | Performed by: ORTHOPAEDIC SURGERY

## 2025-06-30 PROCEDURE — 27201423 OPTIME MED/SURG SUP & DEVICES STERILE SUPPLY: Mod: PO | Performed by: ORTHOPAEDIC SURGERY

## 2025-06-30 PROCEDURE — 71000033 HC RECOVERY, INTIAL HOUR: Mod: PO | Performed by: ORTHOPAEDIC SURGERY

## 2025-06-30 PROCEDURE — C1769 GUIDE WIRE: HCPCS | Mod: PO | Performed by: ORTHOPAEDIC SURGERY

## 2025-06-30 PROCEDURE — C1713 ANCHOR/SCREW BN/BN,TIS/BN: HCPCS | Mod: PO | Performed by: ORTHOPAEDIC SURGERY

## 2025-06-30 PROCEDURE — 63600175 PHARM REV CODE 636 W HCPCS: Mod: PO | Performed by: ORTHOPAEDIC SURGERY

## 2025-06-30 PROCEDURE — 63600175 PHARM REV CODE 636 W HCPCS: Mod: PO | Performed by: NURSE ANESTHETIST, CERTIFIED REGISTERED

## 2025-06-30 PROCEDURE — 37000009 HC ANESTHESIA EA ADD 15 MINS: Mod: PO | Performed by: ORTHOPAEDIC SURGERY

## 2025-06-30 DEVICE — IMPLANTABLE DEVICE: Type: IMPLANTABLE DEVICE | Site: FOOT | Status: FUNCTIONAL

## 2025-06-30 RX ORDER — ONDANSETRON 8 MG/1
8 TABLET, FILM COATED ORAL EVERY 12 HOURS PRN
Qty: 20 TABLET | Refills: 0 | Status: SHIPPED | OUTPATIENT
Start: 2025-06-30 | End: 2025-07-10

## 2025-06-30 RX ORDER — CLINDAMYCIN PHOSPHATE 600 MG/50ML
600 INJECTION, SOLUTION INTRAVENOUS
Status: COMPLETED | OUTPATIENT
Start: 2025-06-30 | End: 2025-06-30

## 2025-06-30 RX ORDER — PROPOFOL 10 MG/ML
VIAL (ML) INTRAVENOUS
Status: DISCONTINUED | OUTPATIENT
Start: 2025-06-30 | End: 2025-06-30

## 2025-06-30 RX ORDER — FENTANYL CITRATE 50 UG/ML
INJECTION, SOLUTION INTRAMUSCULAR; INTRAVENOUS
Status: DISCONTINUED | OUTPATIENT
Start: 2025-06-30 | End: 2025-06-30

## 2025-06-30 RX ORDER — HYDROCODONE BITARTRATE AND ACETAMINOPHEN 5; 325 MG/1; MG/1
1 TABLET ORAL EVERY 6 HOURS PRN
Qty: 28 TABLET | Refills: 0 | Status: SHIPPED | OUTPATIENT
Start: 2025-06-30 | End: 2025-07-07

## 2025-06-30 RX ORDER — MUPIROCIN 20 MG/G
OINTMENT TOPICAL
Status: DISCONTINUED | OUTPATIENT
Start: 2025-06-30 | End: 2025-06-30 | Stop reason: HOSPADM

## 2025-06-30 RX ORDER — ONDANSETRON HYDROCHLORIDE 2 MG/ML
INJECTION, SOLUTION INTRAVENOUS
Status: DISCONTINUED | OUTPATIENT
Start: 2025-06-30 | End: 2025-06-30

## 2025-06-30 RX ORDER — SODIUM CHLORIDE 0.9 % (FLUSH) 0.9 %
3 SYRINGE (ML) INJECTION
Status: DISCONTINUED | OUTPATIENT
Start: 2025-06-30 | End: 2025-06-30 | Stop reason: HOSPADM

## 2025-06-30 RX ORDER — OXYCODONE HYDROCHLORIDE 5 MG/1
5 TABLET ORAL
Status: DISCONTINUED | OUTPATIENT
Start: 2025-06-30 | End: 2025-06-30 | Stop reason: HOSPADM

## 2025-06-30 RX ORDER — SODIUM CHLORIDE, SODIUM LACTATE, POTASSIUM CHLORIDE, CALCIUM CHLORIDE 600; 310; 30; 20 MG/100ML; MG/100ML; MG/100ML; MG/100ML
INJECTION, SOLUTION INTRAVENOUS CONTINUOUS
Status: DISCONTINUED | OUTPATIENT
Start: 2025-06-30 | End: 2025-06-30 | Stop reason: HOSPADM

## 2025-06-30 RX ORDER — LIDOCAINE HYDROCHLORIDE 20 MG/ML
INJECTION INTRAVENOUS
Status: DISCONTINUED | OUTPATIENT
Start: 2025-06-30 | End: 2025-06-30

## 2025-06-30 RX ORDER — LABETALOL HYDROCHLORIDE 5 MG/ML
INJECTION, SOLUTION INTRAVENOUS
Status: DISCONTINUED | OUTPATIENT
Start: 2025-06-30 | End: 2025-06-30

## 2025-06-30 RX ORDER — LIDOCAINE HYDROCHLORIDE 10 MG/ML
1 INJECTION, SOLUTION EPIDURAL; INFILTRATION; INTRACAUDAL; PERINEURAL ONCE
Status: DISCONTINUED | OUTPATIENT
Start: 2025-06-30 | End: 2025-06-30 | Stop reason: HOSPADM

## 2025-06-30 RX ORDER — MIDAZOLAM HYDROCHLORIDE 1 MG/ML
INJECTION INTRAMUSCULAR; INTRAVENOUS
Status: DISCONTINUED | OUTPATIENT
Start: 2025-06-30 | End: 2025-06-30

## 2025-06-30 RX ORDER — ACETAMINOPHEN 10 MG/ML
INJECTION, SOLUTION INTRAVENOUS
Status: DISCONTINUED | OUTPATIENT
Start: 2025-06-30 | End: 2025-06-30

## 2025-06-30 RX ORDER — BUPIVACAINE HYDROCHLORIDE 2.5 MG/ML
INJECTION, SOLUTION EPIDURAL; INFILTRATION; INTRACAUDAL; PERINEURAL
Status: DISCONTINUED | OUTPATIENT
Start: 2025-06-30 | End: 2025-06-30 | Stop reason: HOSPADM

## 2025-06-30 RX ORDER — LIDOCAINE HYDROCHLORIDE 10 MG/ML
INJECTION, SOLUTION EPIDURAL; INFILTRATION; INTRACAUDAL; PERINEURAL
Status: DISCONTINUED | OUTPATIENT
Start: 2025-06-30 | End: 2025-06-30 | Stop reason: HOSPADM

## 2025-06-30 RX ADMIN — LABETALOL HYDROCHLORIDE 10 MG: 5 INJECTION, SOLUTION INTRAVENOUS at 08:06

## 2025-06-30 RX ADMIN — PROPOFOL 120 MG: 10 INJECTION, EMULSION INTRAVENOUS at 07:06

## 2025-06-30 RX ADMIN — ONDANSETRON 8 MG: 2 INJECTION, SOLUTION INTRAMUSCULAR; INTRAVENOUS at 06:06

## 2025-06-30 RX ADMIN — MIDAZOLAM HYDROCHLORIDE 2 MG: 1 INJECTION, SOLUTION INTRAMUSCULAR; INTRAVENOUS at 06:06

## 2025-06-30 RX ADMIN — MUPIROCIN: 20 OINTMENT TOPICAL at 06:06

## 2025-06-30 RX ADMIN — ACETAMINOPHEN 1000 MG: 10 INJECTION INTRAVENOUS at 07:06

## 2025-06-30 RX ADMIN — SODIUM CHLORIDE, POTASSIUM CHLORIDE, SODIUM LACTATE AND CALCIUM CHLORIDE: 600; 310; 30; 20 INJECTION, SOLUTION INTRAVENOUS at 06:06

## 2025-06-30 RX ADMIN — FENTANYL CITRATE 50 MCG: 50 INJECTION, SOLUTION INTRAMUSCULAR; INTRAVENOUS at 06:06

## 2025-06-30 RX ADMIN — LIDOCAINE HYDROCHLORIDE 50 MG: 20 INJECTION INTRAVENOUS at 07:06

## 2025-06-30 RX ADMIN — CLINDAMYCIN IN 5 PERCENT DEXTROSE 600 MG: 12 INJECTION, SOLUTION INTRAVENOUS at 06:06

## 2025-06-30 RX ADMIN — FENTANYL CITRATE 50 MCG: 50 INJECTION, SOLUTION INTRAMUSCULAR; INTRAVENOUS at 07:06

## 2025-06-30 NOTE — TRANSFER OF CARE
"Anesthesia Transfer of Care Note    Patient: Gricelda Hassan    Procedure(s) Performed: Procedure(s) (LRB):  REMOVAL, HARDWARE, FOOT (Left)    Patient location: PACU    Anesthesia Type: general    Transport from OR: Transported from OR on room air with adequate spontaneous ventilation    Post pain: adequate analgesia    Post assessment: no apparent anesthetic complications and tolerated procedure well    Post vital signs: stable    Level of consciousness: sedated    Nausea/Vomiting: no nausea/vomiting    Complications: none    Transfer of care protocol was followed      Last vitals: Visit Vitals  BP (!) 182/96   Pulse 60   Temp 36.2 °C (97.2 °F) (Skin)   Resp 19   Ht 5' 3" (1.6 m)   Wt 54 kg (119 lb)   LMP  (LMP Unknown)   SpO2 100%   Breastfeeding No   BMI 21.08 kg/m²     "

## 2025-06-30 NOTE — OP NOTE
Date of Surgery: 06/30/2025    Patient: Gricelda Hassan    Pre-Operative Diagnosis:  Retained deep orthopedic implants, Left foot.    Post-Operative Diagnosis:  Retained deep orthopedic implants, Left foot.    Procedures:  Removal of deep orthopedic implants.    Surgeon: Javier Hammonds MD    Assist: Ember Muhammad PA-C.  She assisted in safe patient positioning, sterile draping, soft tissue retraction and extremity positioning, general surgical tasks maintaining the surgical field, and wound closure. Their assistance contributed to the successful, timely, and safe completion of the procedure and decreased overall operative time and exposure to anesthesia time. This reduced the overall risks and morbidity to the patient.    Anesthesia: General with local anesthetic    Estimated Blood Loss: <5mL.    Drains: None.    Findings:   Prominent screw head the dorsal 1st metatarsal base. Increased sclerosis adjacent to the distal 1st metatarsal osteotomy.     Implants:   [place] 3.0mm cannulated beveled screw.    Operative Indication:  Gricelda Hassan is a 74 y.o. female with a history significant for previous MIS bunion correction.  Patient initially did well postoperatively.  Worsening irritation due to hardware prominence over the dorsal aspect of the 1st metatarsal.  Operative versus non operative treatment options were discussed.  Risks and benefits were described.  These include but are not limited to bleeding; infection; damage to surrounding nerves or vessels; persistent pain, stiffness; nonunion; malunion; need for additional procedures; amputation; blood clots; pulmonary embolus; cardiac events; stroke; and the general risks of anesthesia including anesthetic death.   We also reviewed postop protocol as well as limitations and expectations. Patient understands and desires to proceed with surgical intervention. Consent was obtained and the left lower leg was marked.    Operative Procedure:  The patient was  transferred to the operating room, transferred to the OR table in a supine position then placed under general endotracheal anesthesia by the anesthesiology service. All bony prominences were appropriately padded.  Patient was secured to the table. An SCD was placed on the contralateral lower limb.  The patient was prepped and draped in usual sterile fashion.  A time-out was then called.  The patient, procedure, surgical site was verified and everyone was in agreement.  Preoperative IV antibiotics were administered. The surgical extremity was exsanguinated with an esmarch bandage which was then used as a tourniquet around the calf musculature.  A 0.5cm incision was made over the dorsal aspect of the 1st metatarsal using a #15 blade.  Blunt dissection was performed down level of the hardware.  This was subsequently removed without complication.    Attention was then turned toward screw placement.    A separate 0.5cm incision was made over the medial aspect of the 1st metatarsal.  Guidewire voiced from to lateral within the metatarsal shaft distally into the head.  Once satisfactory position was confirmed AP and views, this was measured, overdrilled, and 3.0mm headless cannulated screw was placed complication.    The Esmarch tourniquet was let down good hemostasis was obtained.  All wounds were copiously irrigated and closed in standard layered fashion including 3-0 Monocryl subcuticularly, and 3-0 nylon on the skin.    The surgical sites were infiltrated with local anesthetic.  Sterile dressings including Xeroform, 4 x 4 gauze, Webril, and Ace bandage were placed followed by a tall cam boot.  Patient was reversed from anesthesia and transferred to recovery in stable condition.    Javier Hammonds MD

## 2025-06-30 NOTE — ANESTHESIA PREPROCEDURE EVALUATION
06/30/2025  Gricelda Hassan is a 74 y.o., female.      Pre-op Assessment          Review of Systems  Cardiovascular:     Hypertension   CAD                    Coronary Artery Disease:                            Hypertension             Physical Exam  General: Well nourished        Anesthesia Plan  Type of Anesthesia, risks & benefits discussed:    Anesthesia Type: Gen Natural Airway, Gen Supraglottic Airway, MAC  Intra-op Monitoring Plan: Standard ASA Monitors  Post Op Pain Control Plan: multimodal analgesia and IV/PO Opioids PRN  Induction:  IV  Informed Consent: Informed consent signed with the Patient and all parties understand the risks and agree with anesthesia plan.  All questions answered.   ASA Score: 3  Day of Surgery Review of History & Physical: H&P Update referred to the surgeon/provider.    Ready For Surgery From Anesthesia Perspective.     .

## 2025-06-30 NOTE — BRIEF OP NOTE
[facility] - Surgery  Brief Operative Note    SUMMARY     Surgery Date: 6/30/2025     Surgeons and Role:     * Javier Hammonds MD - Primary    Assisting Surgeon: None    Pre-op Diagnosis:  Failed orthopedic implant, initial encounter [T84.498A]    Post-op Diagnosis: Post-Op Diagnosis Codes:     * Failed orthopedic implant, initial encounter [T84.498A]      Procedure(s) (LRB):  REMOVAL, HARDWARE, FOOT (Left)    Operative Findings:   Prominent screw head the dorsal 1st metatarsal base.  Increased sclerosis adjacent to the distal 1st metatarsal osteotomy.    Estimated Blood Loss: 5mL.         Specimens:   Specimen (24h ago, onward)      None

## 2025-06-30 NOTE — ANESTHESIA PROCEDURE NOTES
Intubation    Date/Time: 6/30/2025 7:08 AM    Performed by: Jenniffer Lobato CRNA  Authorized by: Chrissy Ann MD    Intubation:     Induction:  Intravenous    Intubated:  Postinduction    Mask Ventilation:  Easy mask    Attempts:  1    Attempted By:  CRNA    Method of Intubation:  Other (see comments)    Difficult Airway Encountered?: No      Complications:  None    Airway Device:  Supraglottic airway/LMA    Airway Device Size:  3.0    Style/Cuff Inflation:  Cuffed    Inflation Amount (mL):  10    Secured at:  The lips    Placement Verified By:  Capnometry    Complicating Factors:  None

## 2025-06-30 NOTE — DISCHARGE SUMMARY
[facility] - [facility]  Discharge Note  Short Stay    Procedure(s) (LRB):  REMOVAL, HARDWARE, FOOT (Left)      OUTCOME: Patient tolerated treatment/procedure well without complication and is now ready for discharge.    DISPOSITION: Home or Self Care    FINAL DIAGNOSIS:  Prominent Left foot hardware.    FOLLOWUP: In clinic    DISCHARGE INSTRUCTIONS:  No discharge procedures on file.     TIME SPENT ON DISCHARGE: 15 minutes

## 2025-06-30 NOTE — OPERATIVE NOTE ADDENDUM
Certification of Assistant at Surgery       Surgery Date: 6/30/2025     Participating Surgeons:  Surgeons and Role:     * Javier Hammonds MD - Primary    Procedures:  Procedure(s) (LRB):  REMOVAL, HARDWARE, FOOT (Left)    Assistant Surgeon's Certification of Necessity:  I understand that section 1842 (b) (6) (d) of the Social Security Act generally prohibits Medicare Part B reasonable charge payment for the services of assistants at surgery in teaching hospitals when qualified residents are available to furnish such services. I certify that the services for which payment is claimed were medically necessary, and that no qualified resident was available to perform the services. I further understand that these services are subject to post-payment review by the Medicare carrier.      India Muhammad PA-C    06/30/2025  8:10 AM

## 2025-07-01 NOTE — ANESTHESIA POSTPROCEDURE EVALUATION
Anesthesia Post Evaluation    Patient: Gricelda Hassan    Procedure(s) Performed: Procedure(s) (LRB):  REMOVAL, HARDWARE, FOOT (Left)    Final Anesthesia Type: general      Patient location during evaluation: PACU  Patient participation: Yes- Able to Participate  Level of consciousness: awake and alert  Post-procedure vital signs: reviewed and stable  Pain management: adequate  Airway patency: patent    PONV status at discharge: No PONV  Anesthetic complications: no      Cardiovascular status: blood pressure returned to baseline  Respiratory status: unassisted and room air  Hydration status: euvolemic  Follow-up not needed.              Vitals Value Taken Time   /63 06/30/25 08:34   Temp 36.3 °C (97.3 °F) 06/30/25 08:07   Pulse 56 06/30/25 08:34   Resp 14 06/30/25 08:34   SpO2 98 % 06/30/25 08:34         Event Time   Out of Recovery 08:22:00         Pain/Tay Score: Tay Score: 10 (6/30/2025  8:22 AM)

## 2025-07-11 DIAGNOSIS — M20.42 ACQUIRED HAMMERTOE OF LEFT FOOT: Primary | ICD-10-CM

## 2025-07-15 ENCOUNTER — HOSPITAL ENCOUNTER (OUTPATIENT)
Dept: RADIOLOGY | Facility: HOSPITAL | Age: 75
Discharge: HOME | End: 2025-07-15
Attending: ORTHOPAEDIC SURGERY
Payer: MEDICARE

## 2025-07-15 ENCOUNTER — OFFICE VISIT (OUTPATIENT)
Dept: ORTHOPEDICS | Facility: CLINIC | Age: 75
End: 2025-07-15
Payer: MEDICARE

## 2025-07-15 DIAGNOSIS — M20.42 ACQUIRED HAMMERTOE OF LEFT FOOT: ICD-10-CM

## 2025-07-15 DIAGNOSIS — T84.498A FAILED ORTHOPEDIC IMPLANT, INITIAL ENCOUNTER: Primary | ICD-10-CM

## 2025-07-15 PROCEDURE — 73630 X-RAY EXAM OF FOOT: CPT | Mod: 26,LT,, | Performed by: RADIOLOGY

## 2025-07-15 PROCEDURE — 73630 X-RAY EXAM OF FOOT: CPT | Mod: TC,PO,LT

## 2025-07-15 PROCEDURE — 99024 POSTOP FOLLOW-UP VISIT: CPT | Mod: POP,,, | Performed by: ORTHOPAEDIC SURGERY

## 2025-07-15 NOTE — PROGRESS NOTES
Status/Diagnosis: Left hallux valgus with crossover toe deformity; 2nd MTP dorsal dislocation; 2nd metatarsalgia; rigid 2nd hammertoe.  Prominent Left 1st metatarsal HW.  Date of Surgery: 02/01/2023; 05/20/2024; 06/30/2025  Date of Injury: none  Return visit: PRN  X-rays on Return: pending patient complaint     Present History:  Gricelda Hassan is a 75 y.o. female who returns today for her 1st postop visit.  Doing well.  0/10 pain.  Has been heel weight-bearing in her short cam boot.        Physical exam:  There were no vitals filed for this visit.    There is no height or weight on file to calculate BMI.  General: In no apparent distress; well developed and well nourished.  HEENT: normocephalic; atraumatic.  Cardiovascular: regular rate.  Respiratory: no increased work of breathing.  Musculoskeletal:   Gait:  Did not assess  Inspection:   Surgical sites are well healed.  Nylon sutures in place.  Minimal residual swelling.  No warmth or erythema.  No signs or symptoms of infection.  Gross motor and sensory function intact.       Imaging Studies/Outside documentation:  I have ordered/reviewed/interpreted the following images/outside documentation:  NWB 3-views Left foot:  As previously noted, patient is status post hallux valgus correction with 1st TMT joint fusion and proximal phalangeal osteotomy.    Status post distal 1st metatarsal osteotomy with lateral translation and screw fixation.  Patient now status post medial screw exchange.  Overall alignment well-maintained.  No evidence of implant loosening or failure.  Continued signs of bony bridging at the 1st metatarsal osteotomy site.  Also status post open relocation of the 2nd MTP joint.  No evidence of residual subluxation however there is progressive 2nd MTP joint space narrowing present.  Third hammertoe deformity present.       Assessment:  Gricelda Hassan is a 75 y.o. female with Left hallux valgus with crossover toe deformity; 2nd MTP dorsal  dislocation; 2nd metatarsalgia; rigid 2nd hammertoe.     Plan:   Clinical and radiographic findings were discussed.    Sutures removed and Steri-Strips placed.    Patient may remove soft dressing tomorrow and shower.  No baths/submersion for the next week.    Weightbear as tolerated left lower extremity.  Patient informed to let pain be her guide.  Patient voiced understanding all questions were answered.  She will follow up on an as-needed basis.        This note was created using voice recognition software and may contain grammatical errors.

## 2025-08-04 ENCOUNTER — PATIENT MESSAGE (OUTPATIENT)
Dept: ORTHOPEDICS | Facility: CLINIC | Age: 75
End: 2025-08-04
Payer: MEDICARE

## 2025-08-05 ENCOUNTER — TELEPHONE (OUTPATIENT)
Dept: ORTHOPEDICS | Facility: CLINIC | Age: 75
End: 2025-08-05
Payer: MEDICARE

## 2025-08-05 NOTE — TELEPHONE ENCOUNTER
Contacted patient. Informed patient Dr. Hammonds is requesting additional picture views. Patient stated she will not have another surgery and hoping the bump will go away. She denied an appointment or submitting pictures. She will call us in 6 months if the bump doesn't go down. Back line # given to make an appointment. Patient verbalized understanding.

## 2025-08-21 ENCOUNTER — OFFICE VISIT (OUTPATIENT)
Dept: FAMILY MEDICINE | Facility: CLINIC | Age: 75
End: 2025-08-21
Payer: MEDICARE

## 2025-08-21 VITALS
OXYGEN SATURATION: 97 % | HEART RATE: 63 BPM | BODY MASS INDEX: 21.28 KG/M2 | WEIGHT: 120.13 LBS | SYSTOLIC BLOOD PRESSURE: 130 MMHG | DIASTOLIC BLOOD PRESSURE: 78 MMHG

## 2025-08-21 DIAGNOSIS — E78.2 MIXED HYPERLIPIDEMIA: ICD-10-CM

## 2025-08-21 DIAGNOSIS — Z95.818 IMPLANTABLE LOOP RECORDER PRESENT: ICD-10-CM

## 2025-08-21 DIAGNOSIS — M85.89 OSTEOPENIA OF MULTIPLE SITES: Primary | ICD-10-CM

## 2025-08-21 DIAGNOSIS — H91.93 BILATERAL HEARING LOSS, UNSPECIFIED HEARING LOSS TYPE: ICD-10-CM

## 2025-08-21 PROCEDURE — 99999 PR PBB SHADOW E&M-EST. PATIENT-LVL III: CPT | Mod: PBBFAC,,,

## 2025-08-21 PROCEDURE — 99213 OFFICE O/P EST LOW 20 MIN: CPT | Mod: PBBFAC,PO

## 2025-08-21 PROCEDURE — 99214 OFFICE O/P EST MOD 30 MIN: CPT | Mod: S$PBB,,,

## 2025-08-27 ENCOUNTER — CLINICAL SUPPORT (OUTPATIENT)
Dept: AUDIOLOGY | Facility: CLINIC | Age: 75
End: 2025-08-27
Payer: MEDICARE

## 2025-08-27 DIAGNOSIS — Z01.10 HEARING EXAM WITHOUT ABNORMAL FINDINGS: Primary | ICD-10-CM

## 2025-08-27 PROCEDURE — 99212 OFFICE O/P EST SF 10 MIN: CPT | Mod: PBBFAC,PO | Performed by: AUDIOLOGIST-HEARING AID FITTER

## 2025-08-27 PROCEDURE — 92557 COMPREHENSIVE HEARING TEST: CPT | Mod: PBBFAC,PO | Performed by: AUDIOLOGIST-HEARING AID FITTER

## 2025-08-27 PROCEDURE — 92567 TYMPANOMETRY: CPT | Mod: PBBFAC,PO | Performed by: AUDIOLOGIST-HEARING AID FITTER

## 2025-08-27 PROCEDURE — 99999 PR PBB SHADOW E&M-EST. PATIENT-LVL II: CPT | Mod: PBBFAC,,, | Performed by: AUDIOLOGIST-HEARING AID FITTER

## (undated) DEVICE — Device

## (undated) DEVICE — CATH BIDIRECTIONAL DF CRV 7FR

## (undated) DEVICE — SUT 2-0 VICRYL / SH (J417)

## (undated) DEVICE — DRESSING XEROFORM NONADH 1X8IN

## (undated) DEVICE — INTRODUCER HEMOSTASIS 7.5F

## (undated) DEVICE — STRAP OR TABLE 5IN X 72IN

## (undated) DEVICE — KIT SAHARA DRAPE DRAW/LIFT

## (undated) DEVICE — GLOVE BIOGEL PI MICRO SZ 7.5

## (undated) DEVICE — DRAPE C-ARM MINI DISP

## (undated) DEVICE — ELECTRODE REM PLYHSV RETURN 9

## (undated) DEVICE — SUT MONOCRYL 3-0 SH U/D

## (undated) DEVICE — DRAPE T EXTRM SURG 121X128X90

## (undated) DEVICE — DRAPE C-ARMOR EQUIPMENT COVER

## (undated) DEVICE — GLOVE BIOGEL ECLIPSE SZ 7.5

## (undated) DEVICE — DRAPE U SPLIT SHEET 54X76IN

## (undated) DEVICE — BANDAGE ESMARK 6X12

## (undated) DEVICE — DRAPE C ARM 42 X 120 10/BX

## (undated) DEVICE — ORTHOLOC 2 JOINT PREP INSTRUMENT KIT

## (undated) DEVICE — PATCH ENSITE PRECISION NAVX SE

## (undated) DEVICE — DRESSING XEROFORM FOIL PK 1X8

## (undated) DEVICE — CATH SUPREME QPLR CRD-2 6F 120

## (undated) DEVICE — DRAPE PLASTIC U 60X72

## (undated) DEVICE — K-WIRE TRCR PT1.25MM D 150MM L
Type: IMPLANTABLE DEVICE | Site: FOOT | Status: NON-FUNCTIONAL
Removed: 2023-02-01

## (undated) DEVICE — SYR 10CC LUER LOCK

## (undated) DEVICE — APPLICATOR CHLORAPREP ORN 26ML

## (undated) DEVICE — SURGICAL IRRIGATION TUBE

## (undated) DEVICE — SUT VICRYL 0 27 CT-2

## (undated) DEVICE — SUT ETHILON 3-0 PS2 18 BLK

## (undated) DEVICE — BNDG COFLEX FOAM LF2 ST 4X5YD

## (undated) DEVICE — HANDLE SURG LIGHT NONRIGID

## (undated) DEVICE — CATH SAFIRE 7FR 4CC LG SWEEP

## (undated) DEVICE — SUT VICRYL PLUS 0 CT1 36IN

## (undated) DEVICE — SEE L#120831

## (undated) DEVICE — DRAPE STERI U-SHAPED 47X51IN

## (undated) DEVICE — BIT BRILL 2.5

## (undated) DEVICE — TOURNIQUET SB QC DP 30X4IN

## (undated) DEVICE — 3.0 DRILL

## (undated) DEVICE — LINER GLOVE POWDERFREE SZ 7.5

## (undated) DEVICE — PAD CAST SPECIALIST STRL 6

## (undated) DEVICE — SUT CTD VICRYL 0 UND BR

## (undated) DEVICE — BLADE SURG #15 CARBON STEEL

## (undated) DEVICE — MARKER SKIN RULER STERILE

## (undated) DEVICE — SPONGE COTTON TRAY 4X4IN

## (undated) DEVICE — STOCKINET 4INX48

## (undated) DEVICE — ALCOHOL 70% ISOP RUBBING 4OZ

## (undated) DEVICE — PENCIL ROCKER SWITCH 10FT CORD

## (undated) DEVICE — LINER GLOVE POWDERFREE 8

## (undated) DEVICE — BRUSH SCRUB HIBICLENS 4%

## (undated) DEVICE — BANDAGE ESMARK LATEX FREE 4INX

## (undated) DEVICE — BANDAGE ACE DOUBLE STER 6IN

## (undated) DEVICE — DRAPE HALF SURGICAL 40X58IN

## (undated) DEVICE — SEE MEDLINE ITEM 157216

## (undated) DEVICE — K-WIRE 1.0MM
Type: IMPLANTABLE DEVICE | Site: FOOT | Status: NON-FUNCTIONAL
Removed: 2025-06-30

## (undated) DEVICE — SUT 3-0 VICRYL / SH (J416)

## (undated) DEVICE — UNDERGLOVES BIOGEL PI SIZE 7.5

## (undated) DEVICE — BLADE MEDIUM 9MM X 25MM

## (undated) DEVICE — SUT 0 VICRYL / CT-1

## (undated) DEVICE — GAUZE SPONGE 4X4 12PLY

## (undated) DEVICE — PAD DEFIB CADENCE ADULT R2

## (undated) DEVICE — DRAPE THREE-QTR REINF 53X77IN

## (undated) DEVICE — MARKER SKIN STND TIP BLUE BARR

## (undated) DEVICE — NDL HYPO REG 25G X 1 1/2

## (undated) DEVICE — SUT ETHILON 4-0 PS2 18 BLK

## (undated) DEVICE — DRAPE STERI-DRAPE 1000 17X11IN

## (undated) DEVICE — KWIRE ORTHOLOC 3DI 2.5X150MM
Type: IMPLANTABLE DEVICE | Site: FOOT | Status: NON-FUNCTIONAL
Removed: 2023-02-01

## (undated) DEVICE — SPLINT PLASTER FS 5IN X 30IN

## (undated) DEVICE — TUBING SUC UNIV W/CONN 12FT

## (undated) DEVICE — SUT ETHILON 3-0 FS-1 30

## (undated) DEVICE — GLOVE SURGICAL LATEX SZ 8

## (undated) DEVICE — K-WIRE TRCR PT1.6MM DIA 150MM
Type: IMPLANTABLE DEVICE | Site: FOOT | Status: NON-FUNCTIONAL
Removed: 2023-02-01

## (undated) DEVICE — CLEANER TIP ELECSURG 2X2IN

## (undated) DEVICE — BANDAGE ROLL COTTN 4.5INX4.1YD

## (undated) DEVICE — SOCKINETTE IMPERVIOUS 12X48IN

## (undated) DEVICE — BNDG COFLEX FOAM LF2 ST 6X5YD

## (undated) DEVICE — CATH RESPONSE QPLR JSN 6F 120

## (undated) DEVICE — COTTON ROLL ABSORBENT 1 LB ST

## (undated) DEVICE — INTRO 8.5FR 63CM SRO

## (undated) DEVICE — SUT VICRYL 3-0 27 SH

## (undated) DEVICE — DRAPE EXTREMITY W/ABC NON-SLIP

## (undated) DEVICE — GAUGE D PROSTEP MICA STRL

## (undated) DEVICE — PAD CAST SPECIALIST STRL 4